# Patient Record
Sex: MALE | Race: OTHER | NOT HISPANIC OR LATINO | URBAN - METROPOLITAN AREA
[De-identification: names, ages, dates, MRNs, and addresses within clinical notes are randomized per-mention and may not be internally consistent; named-entity substitution may affect disease eponyms.]

---

## 2024-01-01 ENCOUNTER — HOSPITAL ENCOUNTER (OUTPATIENT)
Dept: TELEMEDICINE | Facility: HOSPITAL | Age: 89
Discharge: HOME | End: 2024-01-27
Payer: MEDICARE

## 2024-01-01 ENCOUNTER — ANESTHESIA (OUTPATIENT)
Dept: ENDOSCOPY | Facility: HOSPITAL | Age: 89
DRG: 024 | End: 2024-01-01
Payer: MEDICARE

## 2024-01-01 ENCOUNTER — ANESTHESIA (OUTPATIENT)
Dept: INTERVENTIONAL RADIOLOGY/VASCULAR | Facility: HOSPITAL | Age: 89
DRG: 024 | End: 2024-01-01
Payer: MEDICARE

## 2024-01-01 ENCOUNTER — HOSPITAL ENCOUNTER (INPATIENT)
Facility: HOSPITAL | Age: 89
LOS: 5 days | Discharge: HOSPICE | DRG: 024 | End: 2024-02-01
Attending: STUDENT IN AN ORGANIZED HEALTH CARE EDUCATION/TRAINING PROGRAM | Admitting: INTERNAL MEDICINE
Payer: MEDICARE

## 2024-01-01 ENCOUNTER — ANESTHESIA EVENT (OUTPATIENT)
Dept: ENDOSCOPY | Facility: HOSPITAL | Age: 89
DRG: 024 | End: 2024-01-01
Payer: MEDICARE

## 2024-01-01 ENCOUNTER — HOSPITAL ENCOUNTER (INPATIENT)
Facility: HOSPITAL | Age: 89
LOS: 1 days | DRG: 951 | End: 2024-02-03
Attending: PSYCHIATRY & NEUROLOGY | Admitting: PSYCHIATRY & NEUROLOGY
Payer: OTHER MISCELLANEOUS

## 2024-01-01 VITALS
HEART RATE: 69 BPM | OXYGEN SATURATION: 90 % | SYSTOLIC BLOOD PRESSURE: 126 MMHG | BODY MASS INDEX: 24.78 KG/M2 | RESPIRATION RATE: 18 BRPM | HEIGHT: 70 IN | TEMPERATURE: 98 F | DIASTOLIC BLOOD PRESSURE: 58 MMHG | WEIGHT: 173.06 LBS

## 2024-01-01 VITALS
SYSTOLIC BLOOD PRESSURE: 151 MMHG | OXYGEN SATURATION: 99 % | TEMPERATURE: 99 F | HEART RATE: 84 BPM | RESPIRATION RATE: 20 BRPM | DIASTOLIC BLOOD PRESSURE: 69 MMHG

## 2024-01-01 VITALS
HEART RATE: 160 BPM | TEMPERATURE: 101 F | DIASTOLIC BLOOD PRESSURE: 58 MMHG | RESPIRATION RATE: 34 BRPM | SYSTOLIC BLOOD PRESSURE: 75 MMHG | OXYGEN SATURATION: 78 %

## 2024-01-01 DIAGNOSIS — Z71.89 ACP (ADVANCE CARE PLANNING): ICD-10-CM

## 2024-01-01 DIAGNOSIS — Z95.0 PACEMAKER: ICD-10-CM

## 2024-01-01 DIAGNOSIS — Z51.5 PALLIATIVE CARE ENCOUNTER: ICD-10-CM

## 2024-01-01 DIAGNOSIS — I63.9 ACUTE CVA (CEREBROVASCULAR ACCIDENT): ICD-10-CM

## 2024-01-01 DIAGNOSIS — I63.9 ISCHEMIC CEREBROVASCULAR ACCIDENT (CVA): ICD-10-CM

## 2024-01-01 DIAGNOSIS — I63.412 EMBOLIC STROKE INVOLVING LEFT MIDDLE CEREBRAL ARTERY: ICD-10-CM

## 2024-01-01 DIAGNOSIS — I63.412 EMBOLIC STROKE INVOLVING LEFT MIDDLE CEREBRAL ARTERY: Primary | ICD-10-CM

## 2024-01-01 DIAGNOSIS — I63.9 CEREBROVASCULAR ACCIDENT (CVA), UNSPECIFIED MECHANISM: Primary | ICD-10-CM

## 2024-01-01 LAB
ABO + RH BLD: NORMAL
ALBUMIN SERPL BCP-MCNC: 3.1 G/DL (ref 3.5–5.2)
ALBUMIN SERPL BCP-MCNC: 3.3 G/DL (ref 3.5–5.2)
ALBUMIN SERPL BCP-MCNC: 3.5 G/DL (ref 3.5–5.2)
ALLENS TEST: ABNORMAL
ALP SERPL-CCNC: 84 U/L (ref 55–135)
ALP SERPL-CCNC: 85 U/L (ref 55–135)
ALP SERPL-CCNC: 87 U/L (ref 55–135)
ALP SERPL-CCNC: 87 U/L (ref 55–135)
ALP SERPL-CCNC: 95 U/L (ref 55–135)
ALT SERPL W/O P-5'-P-CCNC: 12 U/L (ref 10–44)
ALT SERPL W/O P-5'-P-CCNC: 14 U/L (ref 10–44)
ALT SERPL W/O P-5'-P-CCNC: 21 U/L (ref 10–44)
ALT SERPL W/O P-5'-P-CCNC: 22 U/L (ref 10–44)
ALT SERPL W/O P-5'-P-CCNC: 33 U/L (ref 10–44)
ANION GAP SERPL CALC-SCNC: 10 MMOL/L (ref 8–16)
ANION GAP SERPL CALC-SCNC: 12 MMOL/L (ref 8–16)
ANION GAP SERPL CALC-SCNC: 13 MMOL/L (ref 8–16)
ANION GAP SERPL CALC-SCNC: 17 MMOL/L (ref 8–16)
ANION GAP SERPL CALC-SCNC: 9 MMOL/L (ref 8–16)
ANION GAP SERPL CALC-SCNC: 9 MMOL/L (ref 8–16)
APTT PPP: 24.8 SEC (ref 21–32)
APTT PPP: 26.9 SEC (ref 21–32)
ASCENDING AORTA: 3.1 CM
AST SERPL-CCNC: 14 U/L (ref 10–40)
AST SERPL-CCNC: 16 U/L (ref 10–40)
AST SERPL-CCNC: 25 U/L (ref 10–40)
AST SERPL-CCNC: 30 U/L (ref 10–40)
AST SERPL-CCNC: 34 U/L (ref 10–40)
AV INDEX (PROSTH): 1.81
AV MEAN GRADIENT: 2 MMHG
AV PEAK GRADIENT: 2 MMHG
AV VALVE AREA BY VELOCITY RATIO: 3.34 CM²
AV VALVE AREA: 6.2 CM²
AV VELOCITY RATIO: 0.97
BACTERIA SPEC AEROBE CULT: NORMAL
BACTERIA SPEC AEROBE CULT: NORMAL
BASOPHILS # BLD AUTO: 0.02 K/UL (ref 0–0.2)
BASOPHILS # BLD AUTO: 0.05 K/UL (ref 0–0.2)
BASOPHILS # BLD AUTO: 0.08 K/UL (ref 0–0.2)
BASOPHILS NFR BLD: 0.3 % (ref 0–1.9)
BASOPHILS NFR BLD: 0.6 % (ref 0–1.9)
BASOPHILS NFR BLD: 1 % (ref 0–1.9)
BILIRUB SERPL-MCNC: 0.6 MG/DL (ref 0.1–1)
BILIRUB SERPL-MCNC: 0.7 MG/DL (ref 0.1–1)
BILIRUB SERPL-MCNC: 0.7 MG/DL (ref 0.1–1)
BILIRUB SERPL-MCNC: 1 MG/DL (ref 0.1–1)
BILIRUB SERPL-MCNC: 1.1 MG/DL (ref 0.1–1)
BILIRUB UR QL STRIP: NEGATIVE
BLD GP AB SCN CELLS X3 SERPL QL: NORMAL
BSA FOR ECHO PROCEDURE: 1.89 M2
BUN SERPL-MCNC: 25 MG/DL (ref 8–23)
BUN SERPL-MCNC: 28 MG/DL (ref 8–23)
BUN SERPL-MCNC: 31 MG/DL (ref 8–23)
BUN SERPL-MCNC: 31 MG/DL (ref 8–23)
BUN SERPL-MCNC: 32 MG/DL (ref 8–23)
BUN SERPL-MCNC: 35 MG/DL (ref 8–23)
CALCIUM SERPL-MCNC: 9.2 MG/DL (ref 8.7–10.5)
CALCIUM SERPL-MCNC: 9.3 MG/DL (ref 8.7–10.5)
CALCIUM SERPL-MCNC: 9.4 MG/DL (ref 8.7–10.5)
CALCIUM SERPL-MCNC: 9.4 MG/DL (ref 8.7–10.5)
CHLORIDE SERPL-SCNC: 101 MMOL/L (ref 95–110)
CHLORIDE SERPL-SCNC: 103 MMOL/L (ref 95–110)
CHLORIDE SERPL-SCNC: 107 MMOL/L (ref 95–110)
CHLORIDE SERPL-SCNC: 108 MMOL/L (ref 95–110)
CHLORIDE SERPL-SCNC: 108 MMOL/L (ref 95–110)
CHLORIDE SERPL-SCNC: 99 MMOL/L (ref 95–110)
CHOLEST SERPL-MCNC: 220 MG/DL (ref 120–199)
CHOLEST/HDLC SERPL: 5.6 {RATIO} (ref 2–5)
CLARITY UR REFRACT.AUTO: CLEAR
CO2 SERPL-SCNC: 20 MMOL/L (ref 23–29)
CO2 SERPL-SCNC: 20 MMOL/L (ref 23–29)
CO2 SERPL-SCNC: 22 MMOL/L (ref 23–29)
CO2 SERPL-SCNC: 22 MMOL/L (ref 23–29)
CO2 SERPL-SCNC: 23 MMOL/L (ref 23–29)
CO2 SERPL-SCNC: 24 MMOL/L (ref 23–29)
COLOR UR AUTO: YELLOW
CREAT SERPL-MCNC: 1.1 MG/DL (ref 0.5–1.4)
CREAT SERPL-MCNC: 1.2 MG/DL (ref 0.5–1.4)
CREAT SERPL-MCNC: 1.3 MG/DL (ref 0.5–1.4)
CREAT SERPL-MCNC: 1.4 MG/DL (ref 0.5–1.4)
CREAT SERPL-MCNC: 1.4 MG/DL (ref 0.5–1.4)
CREAT SERPL-MCNC: 1.7 MG/DL (ref 0.5–1.4)
CV ECHO LV RWT: 0.47 CM
DELSYS: ABNORMAL
DIFFERENTIAL METHOD BLD: ABNORMAL
DOP CALC AO PEAK VEL: 0.78 M/S
DOP CALC AO VTI: 9.13 CM
DOP CALC LVOT AREA: 3.4 CM2
DOP CALC LVOT DIAMETER: 2.09 CM
DOP CALC LVOT PEAK VEL: 0.76 M/S
DOP CALC LVOT STROKE VOLUME: 56.65 CM3
DOP CALCLVOT PEAK VEL VTI: 16.52 CM
E WAVE DECELERATION TIME: 116.31 MSEC
E/A RATIO: 1.95
E/E' RATIO: 10.19 M/S
ECHO LV POSTERIOR WALL: 0.97 CM (ref 0.6–1.1)
EJECTION FRACTION: 43 %
EOSINOPHIL # BLD AUTO: 0 K/UL (ref 0–0.5)
EOSINOPHIL # BLD AUTO: 0.1 K/UL (ref 0–0.5)
EOSINOPHIL # BLD AUTO: 0.2 K/UL (ref 0–0.5)
EOSINOPHIL NFR BLD: 0.2 % (ref 0–8)
EOSINOPHIL NFR BLD: 0.6 % (ref 0–8)
EOSINOPHIL NFR BLD: 1.4 % (ref 0–8)
EOSINOPHIL NFR BLD: 1.7 % (ref 0–8)
EOSINOPHIL NFR BLD: 2.9 % (ref 0–8)
ERYTHROCYTE [DISTWIDTH] IN BLOOD BY AUTOMATED COUNT: 12.7 % (ref 11.5–14.5)
ERYTHROCYTE [DISTWIDTH] IN BLOOD BY AUTOMATED COUNT: 12.8 % (ref 11.5–14.5)
ERYTHROCYTE [DISTWIDTH] IN BLOOD BY AUTOMATED COUNT: 13.1 % (ref 11.5–14.5)
ERYTHROCYTE [DISTWIDTH] IN BLOOD BY AUTOMATED COUNT: 13.1 % (ref 11.5–14.5)
ERYTHROCYTE [DISTWIDTH] IN BLOOD BY AUTOMATED COUNT: 13.2 % (ref 11.5–14.5)
ERYTHROCYTE [SEDIMENTATION RATE] IN BLOOD BY WESTERGREN METHOD: 24 MM/H
EST. GFR  (NO RACE VARIABLE): 38.1 ML/MIN/1.73 M^2
EST. GFR  (NO RACE VARIABLE): 48 ML/MIN/1.73 M^2
EST. GFR  (NO RACE VARIABLE): 48 ML/MIN/1.73 M^2
EST. GFR  (NO RACE VARIABLE): 52.5 ML/MIN/1.73 M^2
EST. GFR  (NO RACE VARIABLE): 57.8 ML/MIN/1.73 M^2
EST. GFR  (NO RACE VARIABLE): >60 ML/MIN/1.73 M^2
ESTIMATED AVG GLUCOSE: 189 MG/DL (ref 68–131)
FIO2: 50
FLOW: 15
FRACTIONAL SHORTENING: 8 % (ref 28–44)
GLUCOSE SERPL-MCNC: 186 MG/DL (ref 70–110)
GLUCOSE SERPL-MCNC: 186 MG/DL (ref 70–110)
GLUCOSE SERPL-MCNC: 187 MG/DL (ref 70–110)
GLUCOSE SERPL-MCNC: 191 MG/DL (ref 70–110)
GLUCOSE SERPL-MCNC: 236 MG/DL (ref 70–110)
GLUCOSE SERPL-MCNC: 283 MG/DL (ref 70–110)
GLUCOSE UR QL STRIP: NEGATIVE
GRAM STN SPEC: NORMAL
GRAM STN SPEC: NORMAL
HBA1C MFR BLD: 8.2 % (ref 4–5.6)
HCO3 UR-SCNC: 23 MMOL/L (ref 24–28)
HCT VFR BLD AUTO: 34.6 % (ref 40–54)
HCT VFR BLD AUTO: 34.7 % (ref 40–54)
HCT VFR BLD AUTO: 35.1 % (ref 40–54)
HCT VFR BLD AUTO: 36.9 % (ref 40–54)
HCT VFR BLD AUTO: 37.8 % (ref 40–54)
HDLC SERPL-MCNC: 39 MG/DL (ref 40–75)
HDLC SERPL: 17.7 % (ref 20–50)
HGB BLD-MCNC: 11.2 G/DL (ref 14–18)
HGB BLD-MCNC: 11.3 G/DL (ref 14–18)
HGB BLD-MCNC: 11.5 G/DL (ref 14–18)
HGB BLD-MCNC: 12 G/DL (ref 14–18)
HGB BLD-MCNC: 12.4 G/DL (ref 14–18)
HGB UR QL STRIP: NEGATIVE
IMM GRANULOCYTES # BLD AUTO: 0.01 K/UL (ref 0–0.04)
IMM GRANULOCYTES # BLD AUTO: 0.02 K/UL (ref 0–0.04)
IMM GRANULOCYTES # BLD AUTO: 0.03 K/UL (ref 0–0.04)
IMM GRANULOCYTES # BLD AUTO: 0.03 K/UL (ref 0–0.04)
IMM GRANULOCYTES # BLD AUTO: 0.04 K/UL (ref 0–0.04)
IMM GRANULOCYTES NFR BLD AUTO: 0.2 % (ref 0–0.5)
IMM GRANULOCYTES NFR BLD AUTO: 0.2 % (ref 0–0.5)
IMM GRANULOCYTES NFR BLD AUTO: 0.4 % (ref 0–0.5)
IMM GRANULOCYTES NFR BLD AUTO: 0.4 % (ref 0–0.5)
IMM GRANULOCYTES NFR BLD AUTO: 0.5 % (ref 0–0.5)
INR PPP: 1 (ref 0.8–1.2)
INR PPP: 1.1 (ref 0.8–1.2)
INTERVENTRICULAR SEPTUM: 0.71 CM (ref 0.6–1.1)
IVRT: 82.78 MSEC
KETONES UR QL STRIP: ABNORMAL
LA MAJOR: 6.79 CM
LA MINOR: 6.64 CM
LA WIDTH: 4.65 CM
LACTATE SERPL-SCNC: 1.1 MMOL/L (ref 0.5–2.2)
LACTATE SERPL-SCNC: 1.3 MMOL/L (ref 0.5–2.2)
LDLC SERPL CALC-MCNC: 155.6 MG/DL (ref 63–159)
LEFT ATRIUM SIZE: 4.01 CM
LEFT ATRIUM VOLUME INDEX MOD: 39.6 ML/M2
LEFT ATRIUM VOLUME INDEX: 56 ML/M2
LEFT ATRIUM VOLUME MOD: 75.25 CM3
LEFT ATRIUM VOLUME: 106.42 CM3
LEFT INTERNAL DIMENSION IN SYSTOLE: 3.79 CM (ref 2.1–4)
LEFT VENTRICLE DIASTOLIC VOLUME INDEX: 39.36 ML/M2
LEFT VENTRICLE DIASTOLIC VOLUME: 74.79 ML
LEFT VENTRICLE MASS INDEX: 55 G/M2
LEFT VENTRICLE SYSTOLIC VOLUME INDEX: 32.4 ML/M2
LEFT VENTRICLE SYSTOLIC VOLUME: 61.59 ML
LEFT VENTRICULAR INTERNAL DIMENSION IN DIASTOLE: 4.11 CM (ref 3.5–6)
LEFT VENTRICULAR MASS: 104.33 G
LEUKOCYTE ESTERASE UR QL STRIP: NEGATIVE
LV LATERAL E/E' RATIO: 6.69 M/S
LV SEPTAL E/E' RATIO: 21.4 M/S
LYMPHOCYTES # BLD AUTO: 0.9 K/UL (ref 1–4.8)
LYMPHOCYTES # BLD AUTO: 1.3 K/UL (ref 1–4.8)
LYMPHOCYTES # BLD AUTO: 1.4 K/UL (ref 1–4.8)
LYMPHOCYTES # BLD AUTO: 1.5 K/UL (ref 1–4.8)
LYMPHOCYTES # BLD AUTO: 1.7 K/UL (ref 1–4.8)
LYMPHOCYTES NFR BLD: 15.8 % (ref 18–48)
LYMPHOCYTES NFR BLD: 15.9 % (ref 18–48)
LYMPHOCYTES NFR BLD: 17.2 % (ref 18–48)
LYMPHOCYTES NFR BLD: 18 % (ref 18–48)
LYMPHOCYTES NFR BLD: 20.7 % (ref 18–48)
MAGNESIUM SERPL-MCNC: 1.8 MG/DL (ref 1.6–2.6)
MAGNESIUM SERPL-MCNC: 2.2 MG/DL (ref 1.6–2.6)
MAGNESIUM SERPL-MCNC: 2.3 MG/DL (ref 1.6–2.6)
MAGNESIUM SERPL-MCNC: 2.4 MG/DL (ref 1.6–2.6)
MAGNESIUM SERPL-MCNC: 2.8 MG/DL (ref 1.6–2.6)
MCH RBC QN AUTO: 29.4 PG (ref 27–31)
MCH RBC QN AUTO: 29.6 PG (ref 27–31)
MCH RBC QN AUTO: 29.7 PG (ref 27–31)
MCH RBC QN AUTO: 29.7 PG (ref 27–31)
MCH RBC QN AUTO: 29.9 PG (ref 27–31)
MCHC RBC AUTO-ENTMCNC: 32.3 G/DL (ref 32–36)
MCHC RBC AUTO-ENTMCNC: 32.5 G/DL (ref 32–36)
MCHC RBC AUTO-ENTMCNC: 32.7 G/DL (ref 32–36)
MCHC RBC AUTO-ENTMCNC: 32.8 G/DL (ref 32–36)
MCHC RBC AUTO-ENTMCNC: 32.8 G/DL (ref 32–36)
MCV RBC AUTO: 90 FL (ref 82–98)
MCV RBC AUTO: 91 FL (ref 82–98)
MCV RBC AUTO: 92 FL (ref 82–98)
MODE: ABNORMAL
MONOCYTES # BLD AUTO: 0.4 K/UL (ref 0.3–1)
MONOCYTES # BLD AUTO: 0.9 K/UL (ref 0.3–1)
MONOCYTES # BLD AUTO: 1.1 K/UL (ref 0.3–1)
MONOCYTES NFR BLD: 10.3 % (ref 4–15)
MONOCYTES NFR BLD: 12.8 % (ref 4–15)
MONOCYTES NFR BLD: 13.2 % (ref 4–15)
MONOCYTES NFR BLD: 13.4 % (ref 4–15)
MONOCYTES NFR BLD: 7.4 % (ref 4–15)
MV PEAK A VEL: 0.55 M/S
MV PEAK E VEL: 1.07 M/S
MV STENOSIS PRESSURE HALF TIME: 33.73 MS
MV VALVE AREA P 1/2 METHOD: 6.52 CM2
NEUTROPHILS # BLD AUTO: 4.3 K/UL (ref 1.8–7.7)
NEUTROPHILS # BLD AUTO: 5.1 K/UL (ref 1.8–7.7)
NEUTROPHILS # BLD AUTO: 5.6 K/UL (ref 1.8–7.7)
NEUTROPHILS # BLD AUTO: 5.8 K/UL (ref 1.8–7.7)
NEUTROPHILS # BLD AUTO: 5.9 K/UL (ref 1.8–7.7)
NEUTROPHILS NFR BLD: 63.9 % (ref 38–73)
NEUTROPHILS NFR BLD: 67.2 % (ref 38–73)
NEUTROPHILS NFR BLD: 68.5 % (ref 38–73)
NEUTROPHILS NFR BLD: 70.7 % (ref 38–73)
NEUTROPHILS NFR BLD: 73.4 % (ref 38–73)
NITRITE UR QL STRIP: NEGATIVE
NONHDLC SERPL-MCNC: 181 MG/DL
NRBC BLD-RTO: 0 /100 WBC
PCO2 BLDA: 28.5 MMHG (ref 35–45)
PH SMN: 7.51 [PH] (ref 7.35–7.45)
PH UR STRIP: 5 [PH] (ref 5–8)
PHOSPHATE SERPL-MCNC: 2.3 MG/DL (ref 2.7–4.5)
PHOSPHATE SERPL-MCNC: 2.5 MG/DL (ref 2.7–4.5)
PHOSPHATE SERPL-MCNC: 3.3 MG/DL (ref 2.7–4.5)
PHOSPHATE SERPL-MCNC: 3.4 MG/DL (ref 2.7–4.5)
PHOSPHATE SERPL-MCNC: 4.5 MG/DL (ref 2.7–4.5)
PHOSPHATE SERPL-MCNC: 5.3 MG/DL (ref 2.7–4.5)
PISA TR MAX VEL: 2.59 M/S
PLATELET # BLD AUTO: 180 K/UL (ref 150–450)
PLATELET # BLD AUTO: 184 K/UL (ref 150–450)
PLATELET # BLD AUTO: 194 K/UL (ref 150–450)
PLATELET # BLD AUTO: 196 K/UL (ref 150–450)
PLATELET # BLD AUTO: 198 K/UL (ref 150–450)
PMV BLD AUTO: 11.5 FL (ref 9.2–12.9)
PMV BLD AUTO: 11.5 FL (ref 9.2–12.9)
PMV BLD AUTO: 11.6 FL (ref 9.2–12.9)
PMV BLD AUTO: 12.1 FL (ref 9.2–12.9)
PMV BLD AUTO: 12.1 FL (ref 9.2–12.9)
PO2 BLDA: 144 MMHG (ref 80–100)
POC BE: 0 MMOL/L
POC SATURATED O2: 99 % (ref 95–100)
POC TCO2: 24 MMOL/L (ref 23–27)
POCT GLUCOSE: 146 MG/DL (ref 70–110)
POCT GLUCOSE: 169 MG/DL (ref 70–110)
POCT GLUCOSE: 177 MG/DL (ref 70–110)
POCT GLUCOSE: 178 MG/DL (ref 70–110)
POCT GLUCOSE: 182 MG/DL (ref 70–110)
POCT GLUCOSE: 185 MG/DL (ref 70–110)
POCT GLUCOSE: 189 MG/DL (ref 70–110)
POCT GLUCOSE: 191 MG/DL (ref 70–110)
POCT GLUCOSE: 193 MG/DL (ref 70–110)
POCT GLUCOSE: 207 MG/DL (ref 70–110)
POCT GLUCOSE: 207 MG/DL (ref 70–110)
POCT GLUCOSE: 223 MG/DL (ref 70–110)
POCT GLUCOSE: 226 MG/DL (ref 70–110)
POCT GLUCOSE: 259 MG/DL (ref 70–110)
POCT GLUCOSE: 295 MG/DL (ref 70–110)
POCT GLUCOSE: 301 MG/DL (ref 70–110)
POCT GLUCOSE: 312 MG/DL (ref 70–110)
POTASSIUM SERPL-SCNC: 3.5 MMOL/L (ref 3.5–5.1)
POTASSIUM SERPL-SCNC: 3.8 MMOL/L (ref 3.5–5.1)
POTASSIUM SERPL-SCNC: 4 MMOL/L (ref 3.5–5.1)
POTASSIUM SERPL-SCNC: 4.2 MMOL/L (ref 3.5–5.1)
POTASSIUM SERPL-SCNC: 4.3 MMOL/L (ref 3.5–5.1)
POTASSIUM SERPL-SCNC: 4.4 MMOL/L (ref 3.5–5.1)
POTASSIUM SERPL-SCNC: 4.4 MMOL/L (ref 3.5–5.1)
PROCALCITONIN SERPL IA-MCNC: 0.06 NG/ML
PROT SERPL-MCNC: 6.5 G/DL (ref 6–8.4)
PROT SERPL-MCNC: 6.5 G/DL (ref 6–8.4)
PROT SERPL-MCNC: 6.8 G/DL (ref 6–8.4)
PROT SERPL-MCNC: 6.8 G/DL (ref 6–8.4)
PROT SERPL-MCNC: 7 G/DL (ref 6–8.4)
PROT UR QL STRIP: NEGATIVE
PROTHROMBIN TIME: 11 SEC (ref 9–12.5)
PROTHROMBIN TIME: 11.3 SEC (ref 9–12.5)
RA MAJOR: 5.48 CM
RA PRESSURE ESTIMATED: 8 MMHG
RA WIDTH: 3.67 CM
RBC # BLD AUTO: 3.78 M/UL (ref 4.6–6.2)
RBC # BLD AUTO: 3.8 M/UL (ref 4.6–6.2)
RBC # BLD AUTO: 3.85 M/UL (ref 4.6–6.2)
RBC # BLD AUTO: 4.04 M/UL (ref 4.6–6.2)
RBC # BLD AUTO: 4.22 M/UL (ref 4.6–6.2)
RIGHT VENTRICULAR END-DIASTOLIC DIMENSION: 2.87 CM
RV TB RVSP: 11 MMHG
SAMPLE: ABNORMAL
SARS-COV-2 RNA RESP QL NAA+PROBE: NOT DETECTED
SINUS: 2.98 CM
SITE: ABNORMAL
SODIUM SERPL-SCNC: 135 MMOL/L (ref 136–145)
SODIUM SERPL-SCNC: 136 MMOL/L (ref 136–145)
SODIUM SERPL-SCNC: 136 MMOL/L (ref 136–145)
SODIUM SERPL-SCNC: 139 MMOL/L (ref 136–145)
SODIUM SERPL-SCNC: 140 MMOL/L (ref 136–145)
SODIUM SERPL-SCNC: 141 MMOL/L (ref 136–145)
SP GR UR STRIP: >1.03 (ref 1–1.03)
SP02: 97
SPECIMEN OUTDATE: NORMAL
STJ: 2.35 CM
TDI LATERAL: 0.16 M/S
TDI SEPTAL: 0.05 M/S
TDI: 0.11 M/S
TR MAX PG: 27 MMHG
TRICUSPID ANNULAR PLANE SYSTOLIC EXCURSION: 2.1 CM
TRIGL SERPL-MCNC: 127 MG/DL (ref 30–150)
TSH SERPL DL<=0.005 MIU/L-ACNC: 1.88 UIU/ML (ref 0.4–4)
TV REST PULMONARY ARTERY PRESSURE: 35 MMHG
URN SPEC COLLECT METH UR: ABNORMAL
VANCOMYCIN TROUGH SERPL-MCNC: 12.8 UG/ML (ref 10–22)
WBC # BLD AUTO: 5.83 K/UL (ref 3.9–12.7)
WBC # BLD AUTO: 8.01 K/UL (ref 3.9–12.7)
WBC # BLD AUTO: 8.32 K/UL (ref 3.9–12.7)
WBC # BLD AUTO: 8.36 K/UL (ref 3.9–12.7)
WBC # BLD AUTO: 8.44 K/UL (ref 3.9–12.7)
Z-SCORE OF LEFT VENTRICULAR DIMENSION IN END DIASTOLE: -2.6
Z-SCORE OF LEFT VENTRICULAR DIMENSION IN END SYSTOLE: 1.13

## 2024-01-01 PROCEDURE — 27000221 HC OXYGEN, UP TO 24 HOURS

## 2024-01-01 PROCEDURE — 92523 SPEECH SOUND LANG COMPREHEN: CPT

## 2024-01-01 PROCEDURE — 43241 EGD TUBE/CATH INSERTION: CPT | Mod: GC,,, | Performed by: INTERNAL MEDICINE

## 2024-01-01 PROCEDURE — 25000003 PHARM REV CODE 250: Performed by: PHYSICIAN ASSISTANT

## 2024-01-01 PROCEDURE — 85025 COMPLETE CBC W/AUTO DIFF WBC: CPT | Performed by: NURSE PRACTITIONER

## 2024-01-01 PROCEDURE — 99233 SBSQ HOSP IP/OBS HIGH 50: CPT | Mod: ,,, | Performed by: PSYCHIATRY & NEUROLOGY

## 2024-01-01 PROCEDURE — 25000003 PHARM REV CODE 250: Performed by: REGISTERED NURSE

## 2024-01-01 PROCEDURE — 20000000 HC ICU ROOM

## 2024-01-01 PROCEDURE — 87070 CULTURE OTHR SPECIMN AEROBIC: CPT | Performed by: NURSE PRACTITIONER

## 2024-01-01 PROCEDURE — 37000009 HC ANESTHESIA EA ADD 15 MINS: Performed by: INTERNAL MEDICINE

## 2024-01-01 PROCEDURE — 99291 CRITICAL CARE FIRST HOUR: CPT | Mod: GC,,, | Performed by: PSYCHIATRY & NEUROLOGY

## 2024-01-01 PROCEDURE — 63600175 PHARM REV CODE 636 W HCPCS: Performed by: REGISTERED NURSE

## 2024-01-01 PROCEDURE — 84100 ASSAY OF PHOSPHORUS: CPT | Performed by: NURSE PRACTITIONER

## 2024-01-01 PROCEDURE — 99292 CRITICAL CARE ADDL 30 MIN: CPT | Mod: ,,, | Performed by: PHYSICIAN ASSISTANT

## 2024-01-01 PROCEDURE — 25000003 PHARM REV CODE 250

## 2024-01-01 PROCEDURE — D9220A PRA ANESTHESIA: Mod: CRNA,,, | Performed by: NURSE ANESTHETIST, CERTIFIED REGISTERED

## 2024-01-01 PROCEDURE — 63600175 PHARM REV CODE 636 W HCPCS: Mod: JZ,JG | Performed by: PHYSICIAN ASSISTANT

## 2024-01-01 PROCEDURE — 93010 ELECTROCARDIOGRAM REPORT: CPT | Mod: ,,, | Performed by: INTERNAL MEDICINE

## 2024-01-01 PROCEDURE — 11000001 HC ACUTE MED/SURG PRIVATE ROOM

## 2024-01-01 PROCEDURE — 94640 AIRWAY INHALATION TREATMENT: CPT

## 2024-01-01 PROCEDURE — 94761 N-INVAS EAR/PLS OXIMETRY MLT: CPT

## 2024-01-01 PROCEDURE — 86580 TB INTRADERMAL TEST: CPT | Performed by: PSYCHIATRY & NEUROLOGY

## 2024-01-01 PROCEDURE — 94668 MNPJ CHEST WALL SBSQ: CPT

## 2024-01-01 PROCEDURE — 83605 ASSAY OF LACTIC ACID: CPT

## 2024-01-01 PROCEDURE — 94667 MNPJ CHEST WALL 1ST: CPT

## 2024-01-01 PROCEDURE — 99291 CRITICAL CARE FIRST HOUR: CPT | Mod: 25,,, | Performed by: NURSE PRACTITIONER

## 2024-01-01 PROCEDURE — 83735 ASSAY OF MAGNESIUM: CPT | Performed by: NURSE PRACTITIONER

## 2024-01-01 PROCEDURE — B317YZZ FLUOROSCOPY OF LEFT INTERNAL CAROTID ARTERY USING OTHER CONTRAST: ICD-10-PCS | Performed by: PSYCHIATRY & NEUROLOGY

## 2024-01-01 PROCEDURE — 83036 HEMOGLOBIN GLYCOSYLATED A1C: CPT | Performed by: PHYSICIAN ASSISTANT

## 2024-01-01 PROCEDURE — 80061 LIPID PANEL: CPT | Performed by: NURSE PRACTITIONER

## 2024-01-01 PROCEDURE — 85610 PROTHROMBIN TIME: CPT | Performed by: PHYSICIAN ASSISTANT

## 2024-01-01 PROCEDURE — 84100 ASSAY OF PHOSPHORUS: CPT | Mod: 91 | Performed by: PSYCHIATRY & NEUROLOGY

## 2024-01-01 PROCEDURE — 99285 EMERGENCY DEPT VISIT HI MDM: CPT | Mod: 25

## 2024-01-01 PROCEDURE — 25000242 PHARM REV CODE 250 ALT 637 W/ HCPCS

## 2024-01-01 PROCEDURE — 63600175 PHARM REV CODE 636 W HCPCS

## 2024-01-01 PROCEDURE — 25000003 PHARM REV CODE 250: Performed by: PSYCHIATRY & NEUROLOGY

## 2024-01-01 PROCEDURE — 63600175 PHARM REV CODE 636 W HCPCS: Performed by: ANESTHESIOLOGY

## 2024-01-01 PROCEDURE — 87205 SMEAR GRAM STAIN: CPT | Performed by: NURSE PRACTITIONER

## 2024-01-01 PROCEDURE — 63600175 PHARM REV CODE 636 W HCPCS: Performed by: NURSE PRACTITIONER

## 2024-01-01 PROCEDURE — 63600175 PHARM REV CODE 636 W HCPCS: Performed by: PSYCHIATRY & NEUROLOGY

## 2024-01-01 PROCEDURE — 80048 BASIC METABOLIC PNL TOTAL CA: CPT | Performed by: NURSE PRACTITIONER

## 2024-01-01 PROCEDURE — 03CG3ZZ EXTIRPATION OF MATTER FROM INTRACRANIAL ARTERY, PERCUTANEOUS APPROACH: ICD-10-PCS | Performed by: PSYCHIATRY & NEUROLOGY

## 2024-01-01 PROCEDURE — 80053 COMPREHEN METABOLIC PANEL: CPT | Performed by: NURSE PRACTITIONER

## 2024-01-01 PROCEDURE — 25000003 PHARM REV CODE 250: Performed by: NURSE PRACTITIONER

## 2024-01-01 PROCEDURE — 99499 UNLISTED E&M SERVICE: CPT | Mod: GC,,, | Performed by: PSYCHIATRY & NEUROLOGY

## 2024-01-01 PROCEDURE — 99223 1ST HOSP IP/OBS HIGH 75: CPT | Mod: ,,,

## 2024-01-01 PROCEDURE — 31720 CLEARANCE OF AIRWAYS: CPT

## 2024-01-01 PROCEDURE — 81003 URINALYSIS AUTO W/O SCOPE: CPT | Performed by: NURSE PRACTITIONER

## 2024-01-01 PROCEDURE — D9220A PRA ANESTHESIA: Mod: ANES,,, | Performed by: ANESTHESIOLOGY

## 2024-01-01 PROCEDURE — 25000003 PHARM REV CODE 250: Performed by: ANESTHESIOLOGY

## 2024-01-01 PROCEDURE — 99900035 HC TECH TIME PER 15 MIN (STAT)

## 2024-01-01 PROCEDURE — 25000003 PHARM REV CODE 250: Performed by: NURSE ANESTHETIST, CERTIFIED REGISTERED

## 2024-01-01 PROCEDURE — 83605 ASSAY OF LACTIC ACID: CPT | Performed by: NURSE PRACTITIONER

## 2024-01-01 PROCEDURE — 63600175 PHARM REV CODE 636 W HCPCS: Performed by: INTERNAL MEDICINE

## 2024-01-01 PROCEDURE — 63600175 PHARM REV CODE 636 W HCPCS: Performed by: PHYSICIAN ASSISTANT

## 2024-01-01 PROCEDURE — 93005 ELECTROCARDIOGRAM TRACING: CPT

## 2024-01-01 PROCEDURE — 80202 ASSAY OF VANCOMYCIN: CPT | Performed by: PSYCHIATRY & NEUROLOGY

## 2024-01-01 PROCEDURE — 99291 CRITICAL CARE FIRST HOUR: CPT | Mod: ,,, | Performed by: REGISTERED NURSE

## 2024-01-01 PROCEDURE — 99233 SBSQ HOSP IP/OBS HIGH 50: CPT | Mod: ,,,

## 2024-01-01 PROCEDURE — 36415 COLL VENOUS BLD VENIPUNCTURE: CPT | Performed by: INTERNAL MEDICINE

## 2024-01-01 PROCEDURE — 25000003 PHARM REV CODE 250: Performed by: INTERNAL MEDICINE

## 2024-01-01 PROCEDURE — 63600175 PHARM REV CODE 636 W HCPCS: Mod: JG | Performed by: NURSE ANESTHETIST, CERTIFIED REGISTERED

## 2024-01-01 PROCEDURE — 37000008 HC ANESTHESIA 1ST 15 MINUTES: Performed by: INTERNAL MEDICINE

## 2024-01-01 PROCEDURE — 85730 THROMBOPLASTIN TIME PARTIAL: CPT | Performed by: PHYSICIAN ASSISTANT

## 2024-01-01 PROCEDURE — 30200315 PPD INTRADERMAL TEST REV CODE 302: Performed by: PSYCHIATRY & NEUROLOGY

## 2024-01-01 PROCEDURE — 27100171 HC OXYGEN HIGH FLOW UP TO 24 HOURS

## 2024-01-01 PROCEDURE — G0408 INPT/TELE FOLLOW UP 35: HCPCS | Mod: 95,,, | Performed by: STUDENT IN AN ORGANIZED HEALTH CARE EDUCATION/TRAINING PROGRAM

## 2024-01-01 PROCEDURE — 94761 N-INVAS EAR/PLS OXIMETRY MLT: CPT | Mod: XB

## 2024-01-01 PROCEDURE — 92610 EVALUATE SWALLOWING FUNCTION: CPT

## 2024-01-01 PROCEDURE — B314YZZ FLUOROSCOPY OF LEFT COMMON CAROTID ARTERY USING OTHER CONTRAST: ICD-10-PCS | Performed by: PSYCHIATRY & NEUROLOGY

## 2024-01-01 PROCEDURE — 84145 PROCALCITONIN (PCT): CPT

## 2024-01-01 PROCEDURE — 86850 RBC ANTIBODY SCREEN: CPT | Performed by: NURSE PRACTITIONER

## 2024-01-01 PROCEDURE — 99223 1ST HOSP IP/OBS HIGH 75: CPT | Mod: 25,,, | Performed by: INTERNAL MEDICINE

## 2024-01-01 PROCEDURE — 99900026 HC AIRWAY MAINTENANCE (STAT)

## 2024-01-01 PROCEDURE — 43241 EGD TUBE/CATH INSERTION: CPT | Performed by: INTERNAL MEDICINE

## 2024-01-01 PROCEDURE — 82803 BLOOD GASES ANY COMBINATION: CPT

## 2024-01-01 PROCEDURE — 84132 ASSAY OF SERUM POTASSIUM: CPT | Performed by: PSYCHIATRY & NEUROLOGY

## 2024-01-01 PROCEDURE — 037L3DZ DILATION OF LEFT INTERNAL CAROTID ARTERY WITH INTRALUMINAL DEVICE, PERCUTANEOUS APPROACH: ICD-10-PCS | Performed by: PSYCHIATRY & NEUROLOGY

## 2024-01-01 PROCEDURE — 0D9680Z DRAINAGE OF STOMACH WITH DRAINAGE DEVICE, VIA NATURAL OR ARTIFICIAL OPENING ENDOSCOPIC: ICD-10-PCS | Performed by: INTERNAL MEDICINE

## 2024-01-01 PROCEDURE — 36600 WITHDRAWAL OF ARTERIAL BLOOD: CPT

## 2024-01-01 PROCEDURE — 87635 SARS-COV-2 COVID-19 AMP PRB: CPT | Performed by: PSYCHIATRY & NEUROLOGY

## 2024-01-01 PROCEDURE — 99231 SBSQ HOSP IP/OBS SF/LOW 25: CPT | Mod: ,,, | Performed by: PHYSICIAN ASSISTANT

## 2024-01-01 PROCEDURE — 84443 ASSAY THYROID STIM HORMONE: CPT | Performed by: NURSE PRACTITIONER

## 2024-01-01 PROCEDURE — 63600175 PHARM REV CODE 636 W HCPCS: Performed by: NURSE ANESTHETIST, CERTIFIED REGISTERED

## 2024-01-01 PROCEDURE — 87040 BLOOD CULTURE FOR BACTERIA: CPT | Mod: 59 | Performed by: NURSE PRACTITIONER

## 2024-01-01 PROCEDURE — 99223 1ST HOSP IP/OBS HIGH 75: CPT | Mod: ,,, | Performed by: PSYCHIATRY & NEUROLOGY

## 2024-01-01 RX ORDER — ONDANSETRON HYDROCHLORIDE 2 MG/ML
4 INJECTION, SOLUTION INTRAVENOUS EVERY 8 HOURS PRN
Status: DISCONTINUED | OUTPATIENT
Start: 2024-01-01 | End: 2024-01-01 | Stop reason: HOSPADM

## 2024-01-01 RX ORDER — BISACODYL 10 MG/1
10 SUPPOSITORY RECTAL DAILY PRN
Status: DISCONTINUED | OUTPATIENT
Start: 2024-01-01 | End: 2024-01-01

## 2024-01-01 RX ORDER — GLYCOPYRROLATE 0.2 MG/ML
0.1 INJECTION INTRAMUSCULAR; INTRAVENOUS 3 TIMES DAILY PRN
Status: DISCONTINUED | OUTPATIENT
Start: 2024-01-01 | End: 2024-02-03 | Stop reason: HOSPADM

## 2024-01-01 RX ORDER — HYDRALAZINE HYDROCHLORIDE 25 MG/1
25 TABLET, FILM COATED ORAL EVERY 8 HOURS
Status: DISCONTINUED | OUTPATIENT
Start: 2024-01-01 | End: 2024-01-01

## 2024-01-01 RX ORDER — ATORVASTATIN CALCIUM 40 MG/1
40 TABLET, FILM COATED ORAL DAILY
Status: DISCONTINUED | OUTPATIENT
Start: 2024-01-01 | End: 2024-01-01

## 2024-01-01 RX ORDER — METOPROLOL TARTRATE 1 MG/ML
5 INJECTION, SOLUTION INTRAVENOUS ONCE
Status: COMPLETED | OUTPATIENT
Start: 2024-01-01 | End: 2024-01-01

## 2024-01-01 RX ORDER — SODIUM,POTASSIUM PHOSPHATES 280-250MG
2 POWDER IN PACKET (EA) ORAL
Status: DISCONTINUED | OUTPATIENT
Start: 2024-01-01 | End: 2024-01-01

## 2024-01-01 RX ORDER — MORPHINE SULFATE 1 MG/ML
0-10 INJECTION, SOLUTION INTRAVENOUS CONTINUOUS
Status: DISCONTINUED | OUTPATIENT
Start: 2024-01-01 | End: 2024-02-03 | Stop reason: HOSPADM

## 2024-01-01 RX ORDER — NAPROXEN SODIUM 220 MG/1
81 TABLET, FILM COATED ORAL DAILY
Status: CANCELLED | OUTPATIENT
Start: 2024-01-01

## 2024-01-01 RX ORDER — METOPROLOL TARTRATE 25 MG/1
12.5 TABLET ORAL 2 TIMES DAILY
Status: DISCONTINUED | OUTPATIENT
Start: 2024-01-01 | End: 2024-01-01

## 2024-01-01 RX ORDER — CLOPIDOGREL BISULFATE 75 MG/1
150 TABLET ORAL ONCE
Status: DISCONTINUED | OUTPATIENT
Start: 2024-01-01 | End: 2024-01-01

## 2024-01-01 RX ORDER — METOPROLOL TARTRATE 1 MG/ML
INJECTION, SOLUTION INTRAVENOUS
Status: DISCONTINUED
Start: 2024-01-01 | End: 2024-01-01 | Stop reason: WASHOUT

## 2024-01-01 RX ORDER — ASPIRIN 325 MG
325 TABLET ORAL DAILY
Status: DISCONTINUED | OUTPATIENT
Start: 2024-01-01 | End: 2024-01-01

## 2024-01-01 RX ORDER — SODIUM CHLORIDE FOR INHALATION 3 %
4 VIAL, NEBULIZER (ML) INHALATION EVERY 6 HOURS
Status: DISCONTINUED | OUTPATIENT
Start: 2024-01-01 | End: 2024-01-01

## 2024-01-01 RX ORDER — ASPIRIN 300 MG/1
300 SUPPOSITORY RECTAL DAILY
Status: DISCONTINUED | OUTPATIENT
Start: 2024-01-01 | End: 2024-01-01

## 2024-01-01 RX ORDER — GLYCOPYRROLATE 0.2 MG/ML
0.1 INJECTION INTRAMUSCULAR; INTRAVENOUS 3 TIMES DAILY PRN
Status: CANCELLED | OUTPATIENT
Start: 2024-01-01

## 2024-01-01 RX ORDER — MAGNESIUM SULFATE HEPTAHYDRATE 40 MG/ML
2 INJECTION, SOLUTION INTRAVENOUS ONCE
Status: COMPLETED | OUTPATIENT
Start: 2024-01-01 | End: 2024-01-01

## 2024-01-01 RX ORDER — MORPHINE SULFATE/0.9% NACL/PF 1 MG/ML
1 PLASTIC BAG, INJECTION (ML) INTRAVENOUS CONTINUOUS
Status: CANCELLED | OUTPATIENT
Start: 2024-01-01

## 2024-01-01 RX ORDER — INSULIN ASPART 100 [IU]/ML
2 INJECTION, SOLUTION INTRAVENOUS; SUBCUTANEOUS EVERY 6 HOURS
Status: DISCONTINUED | OUTPATIENT
Start: 2024-01-01 | End: 2024-01-01

## 2024-01-01 RX ORDER — FENTANYL CITRATE 50 UG/ML
12.5 INJECTION, SOLUTION INTRAMUSCULAR; INTRAVENOUS ONCE
Status: DISCONTINUED | OUTPATIENT
Start: 2024-01-01 | End: 2024-01-01

## 2024-01-01 RX ORDER — NOREPINEPHRINE BITARTRATE/D5W 4MG/250ML
0-3 PLASTIC BAG, INJECTION (ML) INTRAVENOUS CONTINUOUS
Status: DISCONTINUED | OUTPATIENT
Start: 2024-01-01 | End: 2024-01-01

## 2024-01-01 RX ORDER — METOPROLOL TARTRATE 1 MG/ML
5 INJECTION, SOLUTION INTRAVENOUS EVERY 4 HOURS
Status: DISCONTINUED | OUTPATIENT
Start: 2024-01-01 | End: 2024-01-01

## 2024-01-01 RX ORDER — SOTALOL HYDROCHLORIDE 160 MG/1
160 TABLET ORAL 2 TIMES DAILY
COMMUNITY
Start: 2023-01-01

## 2024-01-01 RX ORDER — LIDOCAINE HYDROCHLORIDE 20 MG/ML
INJECTION, SOLUTION EPIDURAL; INFILTRATION; INTRACAUDAL; PERINEURAL
Status: DISCONTINUED | OUTPATIENT
Start: 2024-01-01 | End: 2024-01-01

## 2024-01-01 RX ORDER — DILTIAZEM HYDROCHLORIDE 30 MG/1
30 TABLET, FILM COATED ORAL EVERY 8 HOURS
Status: DISCONTINUED | OUTPATIENT
Start: 2024-01-01 | End: 2024-01-01

## 2024-01-01 RX ORDER — HYDRALAZINE HYDROCHLORIDE 20 MG/ML
10 INJECTION INTRAMUSCULAR; INTRAVENOUS EVERY 4 HOURS PRN
Status: DISCONTINUED | OUTPATIENT
Start: 2024-01-01 | End: 2024-01-01

## 2024-01-01 RX ORDER — HEPARIN SODIUM 5000 [USP'U]/ML
5000 INJECTION, SOLUTION INTRAVENOUS; SUBCUTANEOUS EVERY 8 HOURS
Status: DISCONTINUED | OUTPATIENT
Start: 2024-01-01 | End: 2024-01-01

## 2024-01-01 RX ORDER — METOPROLOL TARTRATE 25 MG/1
25 TABLET, FILM COATED ORAL ONCE
Status: COMPLETED | OUTPATIENT
Start: 2024-01-01 | End: 2024-01-01

## 2024-01-01 RX ORDER — OMEPRAZOLE 20 MG/1
20 CAPSULE, DELAYED RELEASE ORAL DAILY
COMMUNITY
Start: 2023-01-01

## 2024-01-01 RX ORDER — METOPROLOL TARTRATE 1 MG/ML
INJECTION, SOLUTION INTRAVENOUS
Status: COMPLETED
Start: 2024-01-01 | End: 2024-01-01

## 2024-01-01 RX ORDER — METOPROLOL TARTRATE 50 MG/1
50 TABLET ORAL 4 TIMES DAILY
Status: DISCONTINUED | OUTPATIENT
Start: 2024-01-01 | End: 2024-01-01

## 2024-01-01 RX ORDER — ACETAMINOPHEN 325 MG/1
650 TABLET ORAL EVERY 6 HOURS PRN
Status: DISCONTINUED | OUTPATIENT
Start: 2024-01-01 | End: 2024-01-01 | Stop reason: HOSPADM

## 2024-01-01 RX ORDER — DEXAMETHASONE SODIUM PHOSPHATE 4 MG/ML
INJECTION, SOLUTION INTRA-ARTICULAR; INTRALESIONAL; INTRAMUSCULAR; INTRAVENOUS; SOFT TISSUE
Status: DISCONTINUED | OUTPATIENT
Start: 2024-01-01 | End: 2024-01-01

## 2024-01-01 RX ORDER — CLOPIDOGREL BISULFATE 75 MG/1
75 TABLET ORAL DAILY
Status: CANCELLED | OUTPATIENT
Start: 2024-01-01

## 2024-01-01 RX ORDER — LABETALOL HCL 20 MG/4 ML
10 SYRINGE (ML) INTRAVENOUS EVERY 4 HOURS PRN
Status: DISCONTINUED | OUTPATIENT
Start: 2024-01-01 | End: 2024-01-01

## 2024-01-01 RX ORDER — METOPROLOL TARTRATE 25 MG/1
25 TABLET, FILM COATED ORAL EVERY 6 HOURS
Status: DISCONTINUED | OUTPATIENT
Start: 2024-01-01 | End: 2024-01-01

## 2024-01-01 RX ORDER — LORAZEPAM 2 MG/ML
1 INJECTION INTRAMUSCULAR EVERY 4 HOURS PRN
Status: DISCONTINUED | OUTPATIENT
Start: 2024-01-01 | End: 2024-02-03 | Stop reason: HOSPADM

## 2024-01-01 RX ORDER — SODIUM CHLORIDE 0.9 % (FLUSH) 0.9 %
10 SYRINGE (ML) INJECTION
Status: DISCONTINUED | OUTPATIENT
Start: 2024-01-01 | End: 2024-01-01

## 2024-01-01 RX ORDER — LORAZEPAM 2 MG/ML
0.5 INJECTION INTRAMUSCULAR
Status: DISCONTINUED | OUTPATIENT
Start: 2024-01-01 | End: 2024-01-01 | Stop reason: HOSPADM

## 2024-01-01 RX ORDER — FUROSEMIDE 20 MG/1
20 TABLET ORAL DAILY PRN
COMMUNITY
Start: 2024-01-01

## 2024-01-01 RX ORDER — LABETALOL HYDROCHLORIDE 5 MG/ML
INJECTION, SOLUTION INTRAVENOUS
Status: DISCONTINUED | OUTPATIENT
Start: 2024-01-01 | End: 2024-01-01

## 2024-01-01 RX ORDER — LORAZEPAM 2 MG/ML
0.5 INJECTION INTRAMUSCULAR EVERY 30 MIN PRN
Status: DISCONTINUED | OUTPATIENT
Start: 2024-01-01 | End: 2024-02-03 | Stop reason: HOSPADM

## 2024-01-01 RX ORDER — LORAZEPAM 2 MG/ML
0.5 INJECTION INTRAMUSCULAR
Status: CANCELLED | OUTPATIENT
Start: 2024-01-01

## 2024-01-01 RX ORDER — CLOPIDOGREL BISULFATE 75 MG/1
75 TABLET ORAL DAILY
Status: DISCONTINUED | OUTPATIENT
Start: 2024-01-01 | End: 2024-01-01

## 2024-01-01 RX ORDER — ROCURONIUM BROMIDE 10 MG/ML
INJECTION, SOLUTION INTRAVENOUS
Status: DISCONTINUED | OUTPATIENT
Start: 2024-01-01 | End: 2024-01-01

## 2024-01-01 RX ORDER — VASOPRESSIN 20 [USP'U]/ML
INJECTION, SOLUTION INTRAMUSCULAR; SUBCUTANEOUS
Status: DISCONTINUED | OUTPATIENT
Start: 2024-01-01 | End: 2024-01-01

## 2024-01-01 RX ORDER — ASPIRIN 300 MG/1
300 SUPPOSITORY RECTAL ONCE
Status: COMPLETED | OUTPATIENT
Start: 2024-01-01 | End: 2024-01-01

## 2024-01-01 RX ORDER — LIDOCAINE HYDROCHLORIDE 20 MG/ML
INJECTION INTRAVENOUS
Status: DISCONTINUED | OUTPATIENT
Start: 2024-01-01 | End: 2024-01-01

## 2024-01-01 RX ORDER — PROPOFOL 10 MG/ML
VIAL (ML) INTRAVENOUS
Status: DISCONTINUED | OUTPATIENT
Start: 2024-01-01 | End: 2024-01-01

## 2024-01-01 RX ORDER — SODIUM CHLORIDE 9 MG/ML
INJECTION, SOLUTION INTRAVENOUS CONTINUOUS
Status: DISCONTINUED | OUTPATIENT
Start: 2024-01-01 | End: 2024-01-01

## 2024-01-01 RX ORDER — DILTIAZEM HYDROCHLORIDE 5 MG/ML
0.25 INJECTION INTRAVENOUS ONCE
Status: COMPLETED | OUTPATIENT
Start: 2024-01-01 | End: 2024-01-01

## 2024-01-01 RX ORDER — MAGNESIUM SULFATE HEPTAHYDRATE 40 MG/ML
INJECTION, SOLUTION INTRAVENOUS
Status: COMPLETED
Start: 2024-01-01 | End: 2024-01-01

## 2024-01-01 RX ORDER — MORPHINE SULFATE/0.9% NACL/PF 1 MG/ML
1 PLASTIC BAG, INJECTION (ML) INTRAVENOUS CONTINUOUS
Status: DISCONTINUED | OUTPATIENT
Start: 2024-01-01 | End: 2024-01-01 | Stop reason: HOSPADM

## 2024-01-01 RX ORDER — FUROSEMIDE 10 MG/ML
20 INJECTION INTRAMUSCULAR; INTRAVENOUS ONCE
Status: COMPLETED | OUTPATIENT
Start: 2024-01-01 | End: 2024-01-01

## 2024-01-01 RX ORDER — RIVAROXABAN 15 MG/1
15 TABLET, FILM COATED ORAL DAILY
COMMUNITY
Start: 2023-01-01

## 2024-01-01 RX ORDER — INSULIN ASPART 100 [IU]/ML
0-5 INJECTION, SOLUTION INTRAVENOUS; SUBCUTANEOUS EVERY 6 HOURS PRN
Status: DISCONTINUED | OUTPATIENT
Start: 2024-01-01 | End: 2024-01-01

## 2024-01-01 RX ORDER — NICARDIPINE HYDROCHLORIDE 0.2 MG/ML
0-15 INJECTION INTRAVENOUS CONTINUOUS
Status: DISCONTINUED | OUTPATIENT
Start: 2024-01-01 | End: 2024-01-01

## 2024-01-01 RX ORDER — ONDANSETRON HYDROCHLORIDE 2 MG/ML
INJECTION, SOLUTION INTRAVENOUS
Status: DISCONTINUED | OUTPATIENT
Start: 2024-01-01 | End: 2024-01-01

## 2024-01-01 RX ORDER — NAPROXEN SODIUM 220 MG/1
81 TABLET, FILM COATED ORAL DAILY
Status: DISCONTINUED | OUTPATIENT
Start: 2024-01-01 | End: 2024-01-01

## 2024-01-01 RX ORDER — GLUCAGON 1 MG
1 KIT INJECTION
Status: DISCONTINUED | OUTPATIENT
Start: 2024-01-01 | End: 2024-01-01

## 2024-01-01 RX ORDER — MORPHINE SULFATE 2 MG/ML
1 INJECTION, SOLUTION INTRAMUSCULAR; INTRAVENOUS EVERY 4 HOURS PRN
Status: DISCONTINUED | OUTPATIENT
Start: 2024-01-01 | End: 2024-01-01

## 2024-01-01 RX ORDER — MUPIROCIN 20 MG/G
OINTMENT TOPICAL 2 TIMES DAILY
Status: DISCONTINUED | OUTPATIENT
Start: 2024-01-01 | End: 2024-01-01

## 2024-01-01 RX ORDER — GLYCOPYRROLATE 0.2 MG/ML
0.1 INJECTION INTRAMUSCULAR; INTRAVENOUS 3 TIMES DAILY PRN
Status: DISCONTINUED | OUTPATIENT
Start: 2024-01-01 | End: 2024-01-01 | Stop reason: HOSPADM

## 2024-01-01 RX ORDER — OXYMETAZOLINE HCL 0.05 %
2 SPRAY, NON-AEROSOL (ML) NASAL 2 TIMES DAILY PRN
Status: DISPENSED | OUTPATIENT
Start: 2024-01-01 | End: 2024-01-01

## 2024-01-01 RX ORDER — PHENYLEPHRINE HYDROCHLORIDE 10 MG/ML
INJECTION INTRAVENOUS
Status: DISCONTINUED | OUTPATIENT
Start: 2024-01-01 | End: 2024-01-01

## 2024-01-01 RX ORDER — ONDANSETRON HYDROCHLORIDE 2 MG/ML
8 INJECTION, SOLUTION INTRAVENOUS EVERY 8 HOURS PRN
Status: DISCONTINUED | OUTPATIENT
Start: 2024-01-01 | End: 2024-02-03 | Stop reason: HOSPADM

## 2024-01-01 RX ORDER — INSULIN ASPART 100 [IU]/ML
0-10 INJECTION, SOLUTION INTRAVENOUS; SUBCUTANEOUS EVERY 6 HOURS PRN
Status: DISCONTINUED | OUTPATIENT
Start: 2024-01-01 | End: 2024-01-01

## 2024-01-01 RX ORDER — FENTANYL CITRATE 50 UG/ML
12.5 INJECTION, SOLUTION INTRAMUSCULAR; INTRAVENOUS ONCE
Status: COMPLETED | OUTPATIENT
Start: 2024-01-01 | End: 2024-01-01

## 2024-01-01 RX ORDER — SUCCINYLCHOLINE CHLORIDE 20 MG/ML
INJECTION INTRAMUSCULAR; INTRAVENOUS
Status: DISCONTINUED | OUTPATIENT
Start: 2024-01-01 | End: 2024-01-01

## 2024-01-01 RX ORDER — METOPROLOL TARTRATE 25 MG/1
25 TABLET, FILM COATED ORAL 4 TIMES DAILY
Status: DISCONTINUED | OUTPATIENT
Start: 2024-01-01 | End: 2024-01-01

## 2024-01-01 RX ORDER — INSULIN ASPART 100 [IU]/ML
2 INJECTION, SOLUTION INTRAVENOUS; SUBCUTANEOUS
Status: DISCONTINUED | OUTPATIENT
Start: 2024-01-01 | End: 2024-01-01

## 2024-01-01 RX ORDER — DILTIAZEM HYDROCHLORIDE 240 MG/1
240 CAPSULE, COATED, EXTENDED RELEASE ORAL DAILY
COMMUNITY
Start: 2023-01-01

## 2024-01-01 RX ADMIN — VASOPRESSIN 2 UNITS: 20 INJECTION INTRAVENOUS at 03:01

## 2024-01-01 RX ADMIN — ACETAMINOPHEN 650 MG: 325 TABLET ORAL at 10:01

## 2024-01-01 RX ADMIN — POTASSIUM BICARBONATE 50 MEQ: 978 TABLET, EFFERVESCENT ORAL at 02:02

## 2024-01-01 RX ADMIN — HEPARIN SODIUM 5000 UNITS: 5000 INJECTION INTRAVENOUS; SUBCUTANEOUS at 05:01

## 2024-01-01 RX ADMIN — SODIUM CHLORIDE SOLN NEBU 3% 4 ML: 3 NEBU SOLN at 12:01

## 2024-01-01 RX ADMIN — PROPOFOL 30 MG: 10 INJECTION, EMULSION INTRAVENOUS at 10:01

## 2024-01-01 RX ADMIN — PHENYLEPHRINE HYDROCHLORIDE 100 MCG: 10 INJECTION INTRAVENOUS at 03:01

## 2024-01-01 RX ADMIN — MUPIROCIN: 20 OINTMENT TOPICAL at 08:01

## 2024-01-01 RX ADMIN — INSULIN ASPART 2 UNITS: 100 INJECTION, SOLUTION INTRAVENOUS; SUBCUTANEOUS at 06:01

## 2024-01-01 RX ADMIN — METOROPROLOL TARTRATE 5 MG: 5 INJECTION, SOLUTION INTRAVENOUS at 09:01

## 2024-01-01 RX ADMIN — CLOPIDOGREL BISULFATE 75 MG: 75 TABLET ORAL at 01:01

## 2024-01-01 RX ADMIN — POTASSIUM BICARBONATE 50 MEQ: 978 TABLET, EFFERVESCENT ORAL at 02:01

## 2024-01-01 RX ADMIN — INSULIN ASPART 4 UNITS: 100 INJECTION, SOLUTION INTRAVENOUS; SUBCUTANEOUS at 06:01

## 2024-01-01 RX ADMIN — PHENYLEPHRINE HYDROCHLORIDE 600 MCG: 10 INJECTION INTRAVENOUS at 03:01

## 2024-01-01 RX ADMIN — METOPROLOL TARTRATE 50 MG: 50 TABLET, FILM COATED ORAL at 09:02

## 2024-01-01 RX ADMIN — PIPERACILLIN SODIUM AND TAZOBACTAM SODIUM 4.5 G: 4; .5 INJECTION, POWDER, FOR SOLUTION INTRAVENOUS at 05:02

## 2024-01-01 RX ADMIN — LORAZEPAM 1 MG: 2 INJECTION INTRAMUSCULAR; INTRAVENOUS at 11:02

## 2024-01-01 RX ADMIN — CLOPIDOGREL BISULFATE 75 MG: 75 TABLET ORAL at 08:01

## 2024-01-01 RX ADMIN — LABETALOL HYDROCHLORIDE 10 MG: 5 INJECTION, SOLUTION INTRAVENOUS at 08:01

## 2024-01-01 RX ADMIN — ASPIRIN 81 MG CHEWABLE TABLET 81 MG: 81 TABLET CHEWABLE at 08:01

## 2024-01-01 RX ADMIN — HEPARIN SODIUM 5000 UNITS: 5000 INJECTION INTRAVENOUS; SUBCUTANEOUS at 01:01

## 2024-01-01 RX ADMIN — PIPERACILLIN SODIUM AND TAZOBACTAM SODIUM 4.5 G: 4; .5 INJECTION, POWDER, FOR SOLUTION INTRAVENOUS at 02:01

## 2024-01-01 RX ADMIN — HYDRALAZINE HYDROCHLORIDE 25 MG: 25 TABLET ORAL at 06:01

## 2024-01-01 RX ADMIN — MUPIROCIN: 20 OINTMENT TOPICAL at 09:01

## 2024-01-01 RX ADMIN — ACETAMINOPHEN 650 MG: 325 TABLET ORAL at 01:01

## 2024-01-01 RX ADMIN — LABETALOL HYDROCHLORIDE 10 MG: 5 INJECTION, SOLUTION INTRAVENOUS at 03:01

## 2024-01-01 RX ADMIN — POTASSIUM & SODIUM PHOSPHATES POWDER PACK 280-160-250 MG 2 PACKET: 280-160-250 PACK at 05:01

## 2024-01-01 RX ADMIN — INSULIN ASPART 1 UNITS: 100 INJECTION, SOLUTION INTRAVENOUS; SUBCUTANEOUS at 12:01

## 2024-01-01 RX ADMIN — LORAZEPAM 1 MG: 2 INJECTION INTRAMUSCULAR; INTRAVENOUS at 03:02

## 2024-01-01 RX ADMIN — INSULIN ASPART 6 UNITS: 100 INJECTION, SOLUTION INTRAVENOUS; SUBCUTANEOUS at 05:01

## 2024-01-01 RX ADMIN — METOPROLOL TARTRATE 25 MG: 25 TABLET, FILM COATED ORAL at 10:01

## 2024-01-01 RX ADMIN — POTASSIUM & SODIUM PHOSPHATES POWDER PACK 280-160-250 MG 2 PACKET: 280-160-250 PACK at 08:01

## 2024-01-01 RX ADMIN — HEPARIN SODIUM 5000 UNITS: 5000 INJECTION INTRAVENOUS; SUBCUTANEOUS at 10:01

## 2024-01-01 RX ADMIN — LABETALOL HYDROCHLORIDE 10 MG: 5 INJECTION, SOLUTION INTRAVENOUS at 04:01

## 2024-01-01 RX ADMIN — CLOPIDOGREL BISULFATE 75 MG: 75 TABLET ORAL at 09:02

## 2024-01-01 RX ADMIN — METOPROLOL TARTRATE 50 MG: 50 TABLET, FILM COATED ORAL at 01:02

## 2024-01-01 RX ADMIN — VANCOMYCIN HYDROCHLORIDE 1000 MG: 1 INJECTION, POWDER, LYOPHILIZED, FOR SOLUTION INTRAVENOUS at 01:02

## 2024-01-01 RX ADMIN — METOROPROLOL TARTRATE 5 MG: 5 INJECTION, SOLUTION INTRAVENOUS at 06:01

## 2024-01-01 RX ADMIN — HYDRALAZINE HYDROCHLORIDE 25 MG: 25 TABLET ORAL at 02:01

## 2024-01-01 RX ADMIN — HEPARIN SODIUM 5000 UNITS: 5000 INJECTION INTRAVENOUS; SUBCUTANEOUS at 06:01

## 2024-01-01 RX ADMIN — METOPROLOL TARTRATE 25 MG: 25 TABLET, FILM COATED ORAL at 08:01

## 2024-01-01 RX ADMIN — VANCOMYCIN HYDROCHLORIDE 1500 MG: 1.5 INJECTION, POWDER, LYOPHILIZED, FOR SOLUTION INTRAVENOUS at 01:01

## 2024-01-01 RX ADMIN — HYDRALAZINE HYDROCHLORIDE 25 MG: 25 TABLET ORAL at 01:02

## 2024-01-01 RX ADMIN — HYDRALAZINE HYDROCHLORIDE 25 MG: 25 TABLET ORAL at 01:01

## 2024-01-01 RX ADMIN — VASOPRESSIN 1 UNITS: 20 INJECTION INTRAVENOUS at 03:01

## 2024-01-01 RX ADMIN — ONDANSETRON 4 MG: 2 INJECTION INTRAMUSCULAR; INTRAVENOUS at 03:01

## 2024-01-01 RX ADMIN — DILTIAZEM HYDROCHLORIDE 30 MG: 30 TABLET, FILM COATED ORAL at 01:02

## 2024-01-01 RX ADMIN — NICARDIPINE HYDROCHLORIDE 2.5 MG/HR: 0.2 INJECTION, SOLUTION INTRAVENOUS at 08:01

## 2024-01-01 RX ADMIN — HEPARIN SODIUM 5000 UNITS: 5000 INJECTION INTRAVENOUS; SUBCUTANEOUS at 05:02

## 2024-01-01 RX ADMIN — LABETALOL HYDROCHLORIDE 10 MG: 5 INJECTION, SOLUTION INTRAVENOUS at 03:02

## 2024-01-01 RX ADMIN — FUROSEMIDE 20 MG: 10 INJECTION, SOLUTION INTRAVENOUS at 10:02

## 2024-01-01 RX ADMIN — TUBERCULIN PURIFIED PROTEIN DERIVATIVE 5 UNITS: 5 INJECTION, SOLUTION INTRADERMAL at 02:02

## 2024-01-01 RX ADMIN — DILTIAZEM HYDROCHLORIDE 2.5 MG/HR: 5 INJECTION INTRAVENOUS at 06:02

## 2024-01-01 RX ADMIN — HYDRALAZINE HYDROCHLORIDE 10 MG: 20 INJECTION, SOLUTION INTRAMUSCULAR; INTRAVENOUS at 11:01

## 2024-01-01 RX ADMIN — DEXAMETHASONE SODIUM PHOSPHATE 4 MG: 4 INJECTION, SOLUTION INTRAMUSCULAR; INTRAVENOUS at 03:01

## 2024-01-01 RX ADMIN — INSULIN ASPART 2 UNITS: 100 INJECTION, SOLUTION INTRAVENOUS; SUBCUTANEOUS at 05:01

## 2024-01-01 RX ADMIN — LIDOCAINE HYDROCHLORIDE 100 MG: 20 INJECTION INTRAVENOUS at 10:01

## 2024-01-01 RX ADMIN — ROCURONIUM BROMIDE 50 MG: 10 INJECTION INTRAVENOUS at 03:01

## 2024-01-01 RX ADMIN — METOPROLOL TARTRATE 25 MG: 25 TABLET, FILM COATED ORAL at 09:01

## 2024-01-01 RX ADMIN — ATORVASTATIN CALCIUM 40 MG: 40 TABLET, FILM COATED ORAL at 08:01

## 2024-01-01 RX ADMIN — METOPROLOL TARTRATE 12.5 MG: 25 TABLET, FILM COATED ORAL at 01:01

## 2024-01-01 RX ADMIN — ATORVASTATIN CALCIUM 40 MG: 40 TABLET, FILM COATED ORAL at 01:01

## 2024-01-01 RX ADMIN — METOROPROLOL TARTRATE 5 MG: 5 INJECTION, SOLUTION INTRAVENOUS at 12:01

## 2024-01-01 RX ADMIN — PIPERACILLIN SODIUM AND TAZOBACTAM SODIUM 4.5 G: 4; .5 INJECTION, POWDER, FOR SOLUTION INTRAVENOUS at 05:01

## 2024-01-01 RX ADMIN — MUPIROCIN: 20 OINTMENT TOPICAL at 10:01

## 2024-01-01 RX ADMIN — PIPERACILLIN SODIUM AND TAZOBACTAM SODIUM 4.5 G: 4; .5 INJECTION, POWDER, FOR SOLUTION INTRAVENOUS at 09:01

## 2024-01-01 RX ADMIN — ATORVASTATIN CALCIUM 40 MG: 40 TABLET, FILM COATED ORAL at 09:02

## 2024-01-01 RX ADMIN — HEPARIN SODIUM 5000 UNITS: 5000 INJECTION INTRAVENOUS; SUBCUTANEOUS at 02:01

## 2024-01-01 RX ADMIN — HYDRALAZINE HYDROCHLORIDE 25 MG: 25 TABLET ORAL at 09:01

## 2024-01-01 RX ADMIN — SUCCINYLCHOLINE CHLORIDE 100 MG: 20 INJECTION, SOLUTION INTRAMUSCULAR; INTRAVENOUS at 03:01

## 2024-01-01 RX ADMIN — PIPERACILLIN SODIUM AND TAZOBACTAM SODIUM 4.5 G: 4; .5 INJECTION, POWDER, FOR SOLUTION INTRAVENOUS at 02:02

## 2024-01-01 RX ADMIN — METOPROLOL TARTRATE 25 MG: 25 TABLET, FILM COATED ORAL at 01:01

## 2024-01-01 RX ADMIN — HEPARIN SODIUM 5000 UNITS: 5000 INJECTION INTRAVENOUS; SUBCUTANEOUS at 01:02

## 2024-01-01 RX ADMIN — MUPIROCIN: 20 OINTMENT TOPICAL at 09:02

## 2024-01-01 RX ADMIN — INSULIN ASPART 8 UNITS: 100 INJECTION, SOLUTION INTRAVENOUS; SUBCUTANEOUS at 12:02

## 2024-01-01 RX ADMIN — INSULIN ASPART 3 UNITS: 100 INJECTION, SOLUTION INTRAVENOUS; SUBCUTANEOUS at 12:02

## 2024-01-01 RX ADMIN — NICARDIPINE HYDROCHLORIDE 2.5 MG/HR: 0.2 INJECTION, SOLUTION INTRAVENOUS at 07:01

## 2024-01-01 RX ADMIN — SODIUM CHLORIDE 500 ML: 9 INJECTION, SOLUTION INTRAVENOUS at 06:01

## 2024-01-01 RX ADMIN — SODIUM CHLORIDE SOLN NEBU 3% 4 ML: 3 NEBU SOLN at 08:02

## 2024-01-01 RX ADMIN — PHENYLEPHRINE HYDROCHLORIDE 200 MCG: 10 INJECTION INTRAVENOUS at 03:01

## 2024-01-01 RX ADMIN — MAGNESIUM SULFATE HEPTAHYDRATE 2 G: 40 INJECTION, SOLUTION INTRAVENOUS at 12:01

## 2024-01-01 RX ADMIN — HEPARIN SODIUM 5000 UNITS: 5000 INJECTION INTRAVENOUS; SUBCUTANEOUS at 09:01

## 2024-01-01 RX ADMIN — LIDOCAINE HYDROCHLORIDE 100 MG: 20 INJECTION, SOLUTION EPIDURAL; INFILTRATION; INTRACAUDAL; PERINEURAL at 03:01

## 2024-01-01 RX ADMIN — HEPARIN SODIUM 5000 UNITS: 5000 INJECTION INTRAVENOUS; SUBCUTANEOUS at 04:01

## 2024-01-01 RX ADMIN — METOPROLOL TARTRATE 5 MG: 1 INJECTION, SOLUTION INTRAVENOUS at 06:01

## 2024-01-01 RX ADMIN — INSULIN ASPART 2 UNITS: 100 INJECTION, SOLUTION INTRAVENOUS; SUBCUTANEOUS at 12:02

## 2024-01-01 RX ADMIN — INSULIN ASPART 8 UNITS: 100 INJECTION, SOLUTION INTRAVENOUS; SUBCUTANEOUS at 06:02

## 2024-01-01 RX ADMIN — METOPROLOL TARTRATE 50 MG: 50 TABLET, FILM COATED ORAL at 04:01

## 2024-01-01 RX ADMIN — LABETALOL HYDROCHLORIDE 10 MG: 5 INJECTION, SOLUTION INTRAVENOUS at 02:01

## 2024-01-01 RX ADMIN — METOROPROLOL TARTRATE 5 MG: 5 INJECTION, SOLUTION INTRAVENOUS at 04:01

## 2024-01-01 RX ADMIN — LORAZEPAM 0.5 MG: 2 INJECTION INTRAMUSCULAR; INTRAVENOUS at 06:02

## 2024-01-01 RX ADMIN — SUGAMMADEX 500 MG: 100 INJECTION, SOLUTION INTRAVENOUS at 04:01

## 2024-01-01 RX ADMIN — VANCOMYCIN HYDROCHLORIDE 1000 MG: 1 INJECTION, POWDER, LYOPHILIZED, FOR SOLUTION INTRAVENOUS at 12:01

## 2024-01-01 RX ADMIN — POTASSIUM & SODIUM PHOSPHATES POWDER PACK 280-160-250 MG 2 PACKET: 280-160-250 PACK at 10:01

## 2024-01-01 RX ADMIN — PROPOFOL 50 MG: 10 INJECTION, EMULSION INTRAVENOUS at 10:01

## 2024-01-01 RX ADMIN — GLYCOPYRROLATE 0.2 MG: 0.2 INJECTION, SOLUTION INTRAMUSCULAR; INTRAVENOUS at 03:01

## 2024-01-01 RX ADMIN — METOPROLOL TARTRATE 50 MG: 50 TABLET, FILM COATED ORAL at 09:01

## 2024-01-01 RX ADMIN — SODIUM CHLORIDE SOLN NEBU 3% 4 ML: 3 NEBU SOLN at 12:02

## 2024-01-01 RX ADMIN — Medication 1 MG/HR: at 07:02

## 2024-01-01 RX ADMIN — HYDRALAZINE HYDROCHLORIDE 10 MG: 20 INJECTION, SOLUTION INTRAMUSCULAR; INTRAVENOUS at 04:01

## 2024-01-01 RX ADMIN — PROPOFOL 100 MG: 10 INJECTION, EMULSION INTRAVENOUS at 03:01

## 2024-01-01 RX ADMIN — SODIUM CHLORIDE SOLN NEBU 3% 4 ML: 3 NEBU SOLN at 08:01

## 2024-01-01 RX ADMIN — MORPHINE SULFATE 1 MG: 2 INJECTION, SOLUTION INTRAMUSCULAR; INTRAVENOUS at 02:02

## 2024-01-01 RX ADMIN — DILTIAZEM HYDROCHLORIDE 2.5 MG/HR: 5 INJECTION INTRAVENOUS at 09:01

## 2024-01-01 RX ADMIN — LORAZEPAM 0.5 MG: 2 INJECTION INTRAMUSCULAR; INTRAVENOUS at 08:02

## 2024-01-01 RX ADMIN — ASPIRIN 300 MG: 300 SUPPOSITORY RECTAL at 06:01

## 2024-01-01 RX ADMIN — SODIUM CHLORIDE SOLN NEBU 3% 4 ML: 3 NEBU SOLN at 06:01

## 2024-01-01 RX ADMIN — GLYCOPYRROLATE 0.1 MG: 0.2 INJECTION INTRAMUSCULAR; INTRAVENOUS at 06:02

## 2024-01-01 RX ADMIN — ACETAMINOPHEN 650 MG: 325 TABLET ORAL at 04:01

## 2024-01-01 RX ADMIN — ASPIRIN 81 MG CHEWABLE TABLET 81 MG: 81 TABLET CHEWABLE at 09:02

## 2024-01-01 RX ADMIN — NICARDIPINE HYDROCHLORIDE 2.5 MG/HR: 0.2 INJECTION, SOLUTION INTRAVENOUS at 12:01

## 2024-01-01 RX ADMIN — ASPIRIN 300 MG: 300 SUPPOSITORY RECTAL at 08:01

## 2024-01-01 RX ADMIN — Medication 1 MG/HR: at 01:02

## 2024-01-01 RX ADMIN — DILTIAZEM HYDROCHLORIDE 20 MG/HR: 5 INJECTION INTRAVENOUS at 06:02

## 2024-01-01 RX ADMIN — DILTIAZEM HYDROCHLORIDE 19.5 MG: 5 INJECTION INTRAVENOUS at 01:01

## 2024-01-01 RX ADMIN — HYDRALAZINE HYDROCHLORIDE 25 MG: 25 TABLET ORAL at 12:02

## 2024-01-01 RX ADMIN — POTASSIUM & SODIUM PHOSPHATES POWDER PACK 280-160-250 MG 2 PACKET: 280-160-250 PACK at 06:01

## 2024-01-01 RX ADMIN — PROPOFOL 20 MG: 10 INJECTION, EMULSION INTRAVENOUS at 10:01

## 2024-01-01 RX ADMIN — HYDRALAZINE HYDROCHLORIDE 25 MG: 25 TABLET ORAL at 05:02

## 2024-01-01 RX ADMIN — METOPROLOL TARTRATE 50 MG: 50 TABLET, FILM COATED ORAL at 12:01

## 2024-01-01 RX ADMIN — MAGNESIUM SULFATE HEPTAHYDRATE 2 G: 40 INJECTION, SOLUTION INTRAVENOUS at 06:01

## 2024-01-01 RX ADMIN — FENTANYL CITRATE 12.5 MCG: 50 INJECTION INTRAMUSCULAR; INTRAVENOUS at 10:02

## 2024-01-01 RX ADMIN — METOPROLOL TARTRATE 25 MG: 25 TABLET, FILM COATED ORAL at 05:01

## 2024-01-01 RX ADMIN — SODIUM CHLORIDE: 9 INJECTION, SOLUTION INTRAVENOUS at 09:01

## 2024-01-01 RX ADMIN — POTASSIUM & SODIUM PHOSPHATES POWDER PACK 280-160-250 MG 2 PACKET: 280-160-250 PACK at 02:01

## 2024-01-27 PROBLEM — I63.9 APHASIA DUE TO ACUTE STROKE: Status: ACTIVE | Noted: 2024-01-01

## 2024-01-27 PROBLEM — I48.91 A-FIB: Chronic | Status: ACTIVE | Noted: 2024-01-01

## 2024-01-27 PROBLEM — R94.31 PROLONGED Q-T INTERVAL ON ECG: Status: ACTIVE | Noted: 2024-01-01

## 2024-01-27 PROBLEM — I63.9 WEAKNESS DUE TO ACUTE STROKE: Status: ACTIVE | Noted: 2024-01-01

## 2024-01-27 PROBLEM — R47.01 APHASIA DUE TO ACUTE STROKE: Status: ACTIVE | Noted: 2024-01-01

## 2024-01-27 PROBLEM — I10 HTN (HYPERTENSION): Chronic | Status: ACTIVE | Noted: 2024-01-01

## 2024-01-27 PROBLEM — R29.818 NEUROLOGIC DEFICIT DUE TO ACUTE ISCHEMIC STROKE: Status: ACTIVE | Noted: 2024-01-01

## 2024-01-27 PROBLEM — I63.412 EMBOLIC STROKE INVOLVING LEFT MIDDLE CEREBRAL ARTERY: Status: ACTIVE | Noted: 2024-01-01

## 2024-01-27 PROBLEM — I48.91 A-FIB: Status: ACTIVE | Noted: 2024-01-01

## 2024-01-27 PROBLEM — R53.81 DEBILITY: Status: ACTIVE | Noted: 2024-01-01

## 2024-01-27 PROBLEM — G81.91 RIGHT HEMIPLEGIA: Status: ACTIVE | Noted: 2024-01-01

## 2024-01-27 PROBLEM — R53.1 WEAKNESS DUE TO ACUTE STROKE: Status: ACTIVE | Noted: 2024-01-01

## 2024-01-27 PROBLEM — E78.5 HLD (HYPERLIPIDEMIA): Chronic | Status: ACTIVE | Noted: 2024-01-01

## 2024-01-27 NOTE — CONSULTS
Consulted for angiogram due to L MCA occlusion.  Emergency consent done.    Plan for angio w thrombectomy.  Anesthesia sedation.    ASA: 4  Mall: 3        Gaurav Doan MD  Vascular and Interventional Neurology  , Department of Neurology  Section Head, Vascular Neurology  Departments of Neurology, Neurosurgery and Radiology  [facility] [place], [place]

## 2024-01-27 NOTE — ASSESSMENT & PLAN NOTE
Pierre Jama is a 89 y.o. male with PMH of HTN, HLD, Afib (on xarelto) that was transferred to [facility] with acute L MCA syndrome for intervention of L M1 occlusion. Patient admitted to OSH 1/25 for PNA, hypoxic respiratory failure, and sepsis. On 1/27 found to have new RSW, aphasia, L gaze. LKW midnight 1/27, OOW for TNK. Telestroke NIHSS 26. CTA with L M1 occlusion, CTH with good ASPECTS. On arrival to [facility], neuro exam stable with NIHSS 24. Patient taken immediately to IR, now s/p L M1 thrombectomy with TICI 3 reperfusion as well as L ICA angioplasty and stenting.     Patient will be admitted to [facility] for close monitoring and observation.      Antithrombotics for secondary stroke prevention: Antiplatelets: Aspirin: 81 mg daily  Clopidogrel: 75 mg daily due to angioplasty and stent placement; will need to resume AC for afib, timing TBD    Statins for secondary stroke prevention and hyperlipidemia, if present:   Statins: Atorvastatin- 40 mg daily    Aggressive risk factor modification: HTN, HLD, Diet, Exercise, Obesity, A-Fib     Rehab efforts: The patient has been evaluated by a stroke team provider and the therapy needs have been fully considered based off the presenting complaints and exam findings. The following therapy evaluations are needed: PT evaluate and treat, OT evaluate and treat, SLP evaluate and treat    Diagnostics ordered/pending: HgbA1C to assess blood glucose levels, Lipid Profile to assess cholesterol levels, MRI head without contrast to assess brain parenchyma, TTE to assess cardiac function/status , TSH to assess thyroid function    VTE prophylaxis: Heparin 5000 units SQ every 8 hours  Mechanical prophylaxis: Place SCDs    BP parameters: Infarct: Post sucessful thrombectomy, SBP <140

## 2024-01-27 NOTE — HPI
Pierre Jama is a 89 y.o. male with PMH of HTN, HLD, Afib (on xarelto) that presents as a transfer from [facility] for higher level care of acute L MCA syndrome for possible intervention of L M1 occlusion. History obtained from review of documents from OSH. He was initially admitted to OSH 1/25 for PNA, hypoxic respiratory failure, and sepsis after presenting with worsening SOB/BEARD. On morning of 1/27 patient noted to have neuro change with new RSW, aphasia, and L gaze. LKN midnight 1/27. Telestroke NIHSS 26, VAN positive. CTH/CTA at OSH with L M1 occlusion, no acute hemorrhage, favorable ASPECTS. Not candidate for TNK due to OOW. On arrival to [facility], neuro exam stable and continues to have L MCA syndrome. NIHSS 24. Patient taken immediately to IR for emergent DSA, now s/p L M1 thrombectomy with TICI 3 reperfusion as well as L ICA angioplasty and stenting. Patient will be admitted to [facility] for close monitoring and observation.

## 2024-01-27 NOTE — ANESTHESIA POSTPROCEDURE EVALUATION
Anesthesia Post Evaluation    Patient: Pierre Jama    Procedure(s) Performed: * No procedures listed *    Final Anesthesia Type: general      Patient location during evaluation: ICU  Patient participation: Yes- Able to Participate  Level of consciousness: awake  Post-procedure vital signs: reviewed and stable  Pain management: adequate  Airway patency: patent      Anesthetic complications: no      Cardiovascular status: hemodynamically stable  Respiratory status: unassisted  Follow-up not needed.                No case tracking events are documented in the log.      Pain/Khushbu Score: No data recorded

## 2024-01-27 NOTE — PROCEDURES
Radiology Post-Procedure Note    Pre Op Diagnosis: L ICA stenosis and L MCA occlusion    Post Op Diagnosis: No stenosis of ICA, TICI 3 MCA    Procedure: Cerebral angiogram, carotid angioplasty and stenting, aspiration thrombectomy L MCA    Procedure performed by: Gaurav Doan MD    Written Informed Consent Obtained: Yes    Specimen Removed: YES clot    Estimated Blood Loss: Minimal    Procedure report:     A 8F sheath was placed into the right femoral artery and a 5F Perez catheter was advanced into the aortic arch.  The left common and intenal carotid arteries were subselected and angiography of the brain was performed after injection into each of these vessels.    Preliminary interpretation: > 75% stenosis of the L ICA, treated to 0% w angioplasty and stenting; L MCA occlusion treated to TICI 3 w aspiration.  Please see Imaging report for full details.    A right femoral artery angiogram was performed, the sheath removed and hemostasis achieved using angioseal.  No hematoma was present at the time of hemostasis.    The patient tolerated the procedure well.         Gaurav Doan MD  Vascular and Interventional Neurology  , Department of Neurology  Section Head, Vascular Neurology  Departments of Neurology, Neurosurgery and Radiology  [facility] [place], [place]

## 2024-01-27 NOTE — ANESTHESIA PREPROCEDURE EVALUATION
01/27/2024  Pre-operative evaluation for * No procedures listed *    Pierre Jama is a 89 y.o. male with acute stroke    Hx of afib on anticoagulation, HTN    Per notes, admitted at outside hospital with pneumonia    Patient Active Problem List   Diagnosis    Embolic stroke involving left middle cerebral artery             Pre-op Assessment    I have reviewed the Patient Summary Reports.     I have reviewed the Nursing Notes. I have reviewed the NPO Status.      Review of Systems  Neurological:   CVA                                        Physical Exam    Airway:  Mallampati: II   Mouth Opening: Normal  Tongue: Normal    Chest/Lungs:  Normal Respiratory Rate    Heart:  Rhythm: Regular Rhythm        Anesthesia Plan  Type of Anesthesia, risks & benefits discussed:    Anesthesia Type: Gen ETT  Intra-op Monitoring Plan: Standard ASA Monitors  Induction:  IV  ASA Score: 4 Emergent    Ready For Surgery From Anesthesia Perspective.     .

## 2024-01-27 NOTE — SUBJECTIVE & OBJECTIVE
No past medical history on file.  No past surgical history on file.   No current facility-administered medications on file prior to encounter.     No current outpatient medications on file prior to encounter.      Allergies: Patient has no allergy information on record.  No family history on file.     Review of Systems   Reason unable to perform ROS: decrease LOC.     Objective:     Vitals:    Pulse: 81  BP: 114/66  MAP (mmHg): 82  Resp: (!) 21  SpO2: 95 %    Temp  Min: 97.4 °F (36.3 °C)  Max: 97.4 °F (36.3 °C)  Pulse  Min: 81  Max: 87  BP  Min: 114/66  Max: 176/98  MAP (mmHg)  Min: 82  Max: 82  Resp  Min: 16  Max: 21  SpO2  Min: 95 %  Max: 98 %    No intake/output data recorded.            Physical Exam  Vitals and nursing note reviewed.   Constitutional:       Appearance: He is ill-appearing.   HENT:      Head: Normocephalic.      Nose: Nose normal.      Mouth/Throat:      Mouth: Mucous membranes are moist.      Pharynx: Oropharynx is clear.   Eyes:      Pupils: Pupils are equal, round, and reactive to light.   Cardiovascular:      Rate and Rhythm: Normal rate and regular rhythm.      Pulses: Normal pulses.      Heart sounds: Normal heart sounds.   Pulmonary:      Effort: Pulmonary effort is normal.      Breath sounds: Normal breath sounds.   Abdominal:      General: Bowel sounds are normal.      Palpations: Abdomen is soft.   Musculoskeletal:         General: Normal range of motion.   Skin:     General: Skin is warm and dry.      Capillary Refill: Capillary refill takes 2 to 3 seconds.   Neurological:      Comments: GCS 10 no sedation  Global asphasia  PERRL  RUE WD  LUE spontanepous  RLE triple flexion  LLE WD  NIH 22 post thrombectomy         Unable to test orientation, language, memory, judgment, insight, fund of knowledge, hearing, shoulder shrug, tongue protrusion, coordination, gait due to level of consciousness.       Today I personally reviewed pertinent medications, lines/drains/airways, imaging,  cardiology results, laboratory results, microbiology results, notably:

## 2024-01-27 NOTE — HPI
Patient is an 89-year-old male with history of atrial fibrillation on anticoagulation, hypertension who presents a transfer from an outside hospital for an acute stroke needing thrombectomy.  Last known normal at 1159 pm.  He initially presented with right-sided weakness, right-sided facial droop and aphasia.  Imaging showed a large vessel occlusion so patient was transferred here for thrombectomy.  No TNK outside of window. Patient s/p thrombectomy TiCi3 with stents in left ICA. Admit to [facility] for neuro monitoring and higher level of care.

## 2024-01-27 NOTE — ED NOTES
Pt to go straight to IR per stroke team at bedside. Dr Peralta at bedside assessing patient. Unable to intake patient or get full assessment prior to patient leaving ED. Pt transported to IR with ED RN Gaston VASQUEZ

## 2024-01-27 NOTE — ASSESSMENT & PLAN NOTE
-Stroke risk factor  -SBP <140 s/p thrombectomy, maintain MAP >65  -Cardene gtt per NCC  -PRN labetalol/hydralazine

## 2024-01-27 NOTE — TELEMEDICINE CONSULT
[facility]  Vascular Neurology  Comprehensive Stroke Center  TeleVascular Neurology Acute Consultation Note        Consult Information  Consults    Consulting Provider: SCOOTER WHITE   Current Providers  No providers found    Patient Location: [facility] ED [facility] PATIENT FLOW CENTER IP Unit    Spoke hospital nurse at bedside with patient assisting consultant.  Patient information was obtained from spouse/SO and primary team.       Stroke Documentation  Acute Stroke Times   Last Known Normal Date: 01/27/24  Last Known Normal Time: 0000  Stroke Team Called Date: 01/27/24  Stroke Team Called Time: 1151  Stroke Team Arrival Date: 01/27/24  Stroke Team Arrival Time: 1152  CT Interpretation Time: 1210  Thrombolytic Therapy Recommended: No  CTA Interpretation Time: 1224  Thrombectomy Recommended: Yes  Decision to Treat Time for IR: 1224    NIH Scale:  1a. Level of Consciousness: 1-->Not alert, but arousable by minor stimulation to obey, answer, or respond  1b. LOC Questions: 2-->Answers neither question correctly  1c. LOC Commands: 2-->Performs neither task correctly  2. Best Gaze: 2-->Forced deviation, or total gaze paresis not overcome by the oculocephalic maneuver  3. Visual: 2-->Complete hemianopia  4. Facial Palsy: 2-->Partial paralysis (total or near-total paralysis of lower face)  5a. Motor Arm, Left: 0-->No drift, limb holds 90 (or 45) degrees for full 10 secs  5b. Motor Arm, Right: 4-->No movement  6a. Motor Leg, Left: 3-->No effort against gravity, leg falls to bed immediately  6b. Motor Leg, Right: 3-->No effort against gravity, leg falls to bed immediately  7. Limb Ataxia: 0-->Absent  8. Sensory: 0-->Normal, no sensory loss  9. Best Language: 3-->Mute, global aphasia, no usable speech or auditory comprehension  10. Dysarthria: 2-->Severe dysarthria, patients speech is so slurred as to be unintelligible in the absence of or out of proportion to any  dysphasia, or is mute/anarthric  11. Extinction and Inattention (formerly Neglect): 0-->No abnormality  Total (NIH Stroke Scale): 26      Modified [place]: Score: 0  [place] Coma Scale:     ABCD2 Score:    [id number] Score:    HAS -BLED Score:    ICH Score:    Hunt & Chandler Classification:      There were no vitals taken for this visit.    Van Positive    Medical Decision Making  HPI:  89 y.o. male with a history of HTN, HLD, Afib on Xarelto was currently admitted with pneumonia, hypoxic respiratory failure and sepsis. LKN was midnight.  Noted to have right-sided weakness, aphasia and left-sided gaze today by primary team.    Xarelto was discontinued upon admission for unclear reasons (per the attending physician, he had anemia upon admission but not severe enough to require blood transfusion and no history of GI bleed) Last dose was > 48 hrs ago.    /100     Images personally reviewed and interpreted:  Study: Head CT and CTA Head & Neck  Study Interpretation:  Hyperdense left distal M1.  No evidence of acute ischemia or hemorrhage.  ASPECTS 10.   CTA shows a left distal M1 occlusion along with severe ( >70%) proximal ICA stenosis bilaterally (appears slightly worse on the right)    Assessment and plan:  88 y/o male with a history of AFib on Xarelto, HTN, HLD, currently admitted with pneumonia and sepsis, who presents with a L MCA syndrome, likely in the setting of being off anticoagulation.    CT head shows no acute ischemia with favorable ASPECTS.  Hyperdense sign in the left distal M1.  CTA shows a left distal M1 occlusion..    Not a candidate for IV thrombolytics as he is OOW  Recommend STAT transfer to the closest appropriate facility for angiogram and possible EVT.  Keep permissive HTN w/ SBP <220/110  Hold off on resuming Xarelto    Discussed plan with attending physician.  IR and Vascular Neurology teams made aware.    Lytics recommendation: Thrombolytic therapy not recommended due to Outside of  treatment window   Thrombectomy recommendation: Yes  Placement recommendation: transfer to [facility] by  air  stat                Review of Systems   Unable to perform ROS: Acuity of condition     Physical Exam  Neurological:      Comments: Does not answer questions or follow commands.    Left gaze  Does not attend to the right  Right facial weakness  Flaccid in the right upper extremity.  Appears to have symmetric drift in both lower extremities and withdraws equally on both sides.       No past medical history on file.  No past surgical history on file.  No family history on file.    Diagnoses  Problem Noted   Embolic Stroke Involving Left Middle Cerebral Artery 1/27/2024       Bennie Hassan MD      Emergent/Acute neurological consultation requested by spoke provider due to critical concerns for possible cerebrovascular event that could result in permanent loss of neurologic/bodily function, severe disability or death of this patient.  Immediate/timely evaluation by a highly prepared expert is paramount for optimal outcomes  High risk for neurological deterioration if not properly diagnosed  High risk for neurological deterioration if not treated promplty/as soon as possible  Complex diagnostic evaluation may be required (advanced imaging)  High risk treatment options (thrombolytics and/or thrombectomy)    Patient care was coordinated with spoke provider, including but not limted to    Discussing likely diagnosis/etiology of symptoms  Making recommendations for further diagnostic studies  Making recommendations for intravenous thrombolytics or other advanced therapies  Making recommendations for disposition (admission/transfer for higher level of care)

## 2024-01-27 NOTE — TRANSFER OF CARE
Anesthesia Transfer of Care Note    Patient: Pierre Jama    Procedure(s) Performed: * No procedures listed *    Patient location: ICU    Anesthesia Type: general    Transport from OR: Transported from OR on 6-10 L/min O2 by face mask with adequate spontaneous ventilation. Continuous ECG monitoring in transport. Continuous SpO2 monitoring in transport    Post pain: adequate analgesia    Post assessment: no apparent anesthetic complications and tolerated procedure well    Post vital signs: stable    Level of consciousness: awake and alert    Nausea/Vomiting: no nausea/vomiting    Complications: none    Transfer of care protocol was followed      Last vitals: There were no vitals taken for this visit.

## 2024-01-27 NOTE — ED PROVIDER NOTES
Encounter Date: 1/27/2024       History     Chief Complaint   Patient presents with    Weakness     Transfer from [facility] for neuro evaluation.     HPI  Patient is an 89-year-old male with history of atrial fibrillation on anticoagulation, hypertension who presents a transfer from an outside hospital for an acute stroke needing thrombectomy.  Last known normal at 1159 pm.  He initially presented with right-sided weakness, right-sided facial droop and aphasia.  Imaging showed a large vessel occlusion so patient was transferred here for thrombectomy.  Per EMS, patient did not received TNK as he was out of the window.  I am unable to obtain any further history due to patient's clinical condition.      Review of patient's allergies indicates:  Not on File  No past medical history on file.  No past surgical history on file.  No family history on file.     Review of Systems  Unable to obtain full review of systems due to clinical condition.    Physical Exam     Initial Vitals [01/27/24 1512]   BP Pulse Resp Temp SpO2   (!) 156/90 87 18 -- 98 %      MAP       --         Physical Exam  Constitutional:  Awake, alert  HENT: Normocephalic, atraumatic  Eyes: PERRL  Respiratory: Non-labored  Cardiovascular: Well perfused  Gastrointestinal: Soft, non-tender, non-distended  Integumentary: Warm and dry  Musculoskeletal: No deformity  Neurological:  Awake, alert, aphasic, right-sided neglect, right-sided facial droop present, patient will withdraw to painful stimuli in the right upper and lower extremities but does not follow commands in the right upper and lower extremities, patient does follow commands in the left upper and lower extremities    ED Course   Procedures  Labs Reviewed - No data to display       Imaging Results    None          Medications - No data to display  Medical Decision Making  Patient is an 89-year-old male with acute stroke and large vessel occlusion he was transferred here for thrombectomy.  Neurology  already at bedside upon patient arrival.  I was able to perform a quick assessment.  They are ready for him in the IR suite so patient was quickly transferred to IR upon arrival.    Critical Care  Performed by: Brigitte Peralta MD   Authorized by: Brigitte Peralta MD    Total critical care time (exclusive of procedural time) : 12 minutes  Critical care was necessary to treat or prevent imminent or life-threatening deterioration of the following conditions:  acute CVA       Amount and/or Complexity of Data Reviewed  Independent Historian: EMS                                      Clinical Impression:  Final diagnoses:  [I63.9] Acute CVA (cerebrovascular accident)          ED Disposition Condition    Admit Stable                Brigitte Peralta MD  01/27/24 1521

## 2024-01-27 NOTE — ASSESSMENT & PLAN NOTE
Antithrombotics for secondary stroke prevention: Antiplatelets: Aspirin: 81 mg daily  Aspirin: 325 mg daily  Clopidogrel: 300 mg loading dose x 1, now  Clopidogrel: 75 mg daily    Statins for secondary stroke prevention and hyperlipidemia, if present:   Statins: Atorvastatin- 40 mg daily    Aggressive risk factor modification: A-Fib     Rehab efforts: The patient has been evaluated by a stroke team provider and the therapy needs have been fully considered based off the presenting complaints and exam findings. The following therapy evaluations are needed: PT evaluate and treat, OT evaluate and treat, SLP evaluate and treat, PM&R evaluate for appropriate placement    Diagnostics ordered/pending: MRI head without contrast to assess brain parenchyma    VTE prophylaxis: Heparin 5000 units SQ every 8 hours    BP parameters: Infarct: Post sucessful thrombectomy, SBP <140

## 2024-01-27 NOTE — SUBJECTIVE & OBJECTIVE
HPI:  89 y.o. male with a history of HTN, HLD, Afib on Xarelto was currently admitted with pneumonia, hypoxic respiratory failure and sepsis. LKN was midnight.  Noted to have right-sided weakness, aphasia and left-sided gaze today by primary team.    Xarelto was discontinued upon admission for unclear reasons (per the attending physician, he had anemia upon admission but not severe enough to require blood transfusion and no history of GI bleed) Last dose was > 48 hrs ago.    /100     Images personally reviewed and interpreted:  Study: Head CT and CTA Head & Neck  Study Interpretation:  Hyperdense left distal M1.  No evidence of acute ischemia or hemorrhage.  ASPECTS 10.   CTA shows a left distal M1 occlusion along with severe ( >70%) proximal ICA stenosis bilaterally    Assessment and plan:  88 y/o male with a history of AFib on Xarelto, HTN, HLD, currently admitted with pneumonia and sepsis, who presents with a L MCA syndrome, likely in the setting of being off anticoagulation.    CT head shows no acute ischemia with favorable ASPECTS.  Hyperdense sign in the left distal M1.  Noted to have a left distal M1 occlusion by Radiology (Images not uploaded to PACS/Easy Viz).    Not a candidate for IV thrombolytics as he is OOW  Recommend STAT transfer to the closest appropriate facility for angiogram and possible EVT.  Keep permissive HTN w/ SBP <220/110  Hold off on resuming Xarelto    Discussed plan with attending physician. PFC made aware.    Lytics recommendation: Thrombolytic therapy not recommended due to Outside of treatment window   Thrombectomy recommendation: Yes  Placement recommendation: transfer to [facility] by  air  stat

## 2024-01-27 NOTE — SUBJECTIVE & OBJECTIVE
Past Medical History:   Diagnosis Date    A-fib 01/27/2024    Diabetes mellitus     GERD (gastroesophageal reflux disease)     HLD (hyperlipidemia) 01/27/2024    HTN (hypertension) 01/27/2024     Past Surgical History:   Procedure Laterality Date    REPLACEMENT OF DUAL CHAMBER PACEMAKER GENERATOR  02/25/2020     Social History     Tobacco Use    Smoking status: Former     Types: Cigarettes   Substance Use Topics    Drug use: Never     Review of patient's allergies indicates:  Not on File    Medications: I have reviewed the current medication administration record.    No medications prior to admission.       Review of Systems   Unable to perform ROS: Other (severe aphasia)     Objective:     Vital Signs (Most Recent):  Temp: 97.5 °F (36.4 °C) (01/27/24 1650)  Pulse: 81 (01/27/24 1650)  Resp: (!) 21 (01/27/24 1650)  BP: 114/66 (01/27/24 1650)  SpO2: 95 % (01/27/24 1650)    Vital Signs Range (Last 24H):  Temp:  [97.4 °F (36.3 °C)-97.5 °F (36.4 °C)]   Pulse:  [81-87]   Resp:  [16-21]   BP: (114-176)/(66-98)   SpO2:  [95 %-98 %]        Physical Exam  Vitals and nursing note reviewed.   Constitutional:       Appearance: He is not toxic-appearing.   HENT:      Head: Normocephalic.      Nose: Nose normal.      Mouth/Throat:      Mouth: Mucous membranes are moist.   Eyes:      General: No scleral icterus.     Conjunctiva/sclera: Conjunctivae normal.   Cardiovascular:      Rate and Rhythm: Normal rate.   Pulmonary:      Effort: Pulmonary effort is normal. No respiratory distress.   Abdominal:      General: There is no distension.   Musculoskeletal:         General: No deformity or signs of injury.      Cervical back: Normal range of motion.   Skin:     General: Skin is warm.      Findings: No bruising.   Neurological:      Mental Status: He is alert.      Cranial Nerves: Dysarthria and facial asymmetry present.      Motor: Weakness present.   Psychiatric:         Speech: He is noncommunicative (severe aphasia).          "Behavior: Behavior is cooperative.              Neurological Exam:   LOC: alert  Attention Span: poor  Language: Global aphasia  Articulation: Untestable due to severe aphasia   Orientation: Untestable due to severe aphasia   Visual Fields: Hemianopsia right  EOM (CN III, IV, VI): Gaze preference  left  Facial Movement (CN VII): Lower facial weakness on the Right  Motor: Arm left  Normal 5/5  Leg left  Paresis: 2/5  Arm right  Plegia 0/5  Leg right Paresis: 2/5  Sensation: Anatoliy-hypoesthesia right      Laboratory:  CMP: No results for input(s): "GLUCOSE", "CALCIUM", "ALBUMIN", "PROT", "NA", "K", "CO2", "CL", "BUN", "CREATININE", "ALKPHOS", "ALT", "AST", "BILITOT" in the last 24 hours.  CBC: No results for input(s): "WBC", "RBC", "HGB", "HCT", "PLT", "MCV", "MCH", "MCHC" in the last 168 hours.  Lipid Panel: No results for input(s): "CHOL", "LDLCALC", "HDL", "TRIG" in the last 168 hours.  Coagulation: No results for input(s): "PT", "INR", "APTT" in the last 168 hours.  Hgb A1C: No results for input(s): "HGBA1C" in the last 168 hours.  TSH: No results for input(s): "TSH" in the last 168 hours.    Diagnostic Results:    Brain Imaging:  [facility] 1/27/2024 @ OSH  Radiology report not available. No acute ischemia with favorable ASPECTS.  Hyperdense sign in the left distal M1.       Vessel Imaging:  IR cerebral angiogram 1/27/2024  Preliminary interpretation: > 75% stenosis of the L ICA, treated to 0% w angioplasty and stenting; L MCA occlusion treated to TICI 3 w aspiration.  Please see Imaging report for full details.    CTA head/neck 1/27/2024 @ OSH  Impression:  1. There is acute clot in the left distal M1 and proximal superior branch M2 MCA with high-grade stenosis/LVO.  Moderate referral collaterals  2. Chronic LVO of the distal right vertebral artery.  High-grade stenosis of the proximal left vertebral artery greater than 70%  3. High-grade stenosis right carotid bifurcation 75%.  Moderate grade stenosis on the left " 60%

## 2024-01-27 NOTE — ASSESSMENT & PLAN NOTE
-Stroke risk factor  -On xarelto, reportedly stopped at OSH due to c/f anemia  -Will need to further discuss timing of when to resume AC for afib

## 2024-01-28 NOTE — PROGRESS NOTES
[facility] Neuro Critical Care  Vascular Neurology  Comprehensive Stroke Center  Progress Note    Assessment/Plan:     * Embolic stroke involving left middle cerebral artery  Pierre Jama is a 89 y.o. male with PMH of HTN, HLD, Afib (on xarelto) that [place] [facility] [place] syndrome for intervention of L M1 occlusion. Patient admitted to OSH 1/25 for PNA, hypoxic respiratory failure, and sepsis. On 1/27 found to have new RSW, aphasia, L gaze. LKW midnight 1/27, OOW for TNK. Telestroke NIHSS 26. CTA with L M1 occlusion, CTH with good ASPECTS. [place] [facility] [place] neuro exam stable with NIHSS 24. Patient taken immediately to IR, now s/p L M1 thrombectomy with TICI 3 reperfusion as well as L ICA angioplasty and stenting.     Continues to have significant L MCA deficits s/p thrombectomy. Overnight had episode of tachycardia in 130s, EKG with wide complex QRS likely ST. Improved with metoprolol. Continues to have intermittent episode of tachycardia per day RN. Unable to get PO access overnight due to tube coiling in stomach. Currently on ASA [place] only, will start plavix for DAPT once PO route established. Repeat CTH, echo, therapy evals pending.      Antithrombotics for secondary stroke prevention: Antiplatelets: Aspirin: 81 mg daily  Clopidogrel: 75 mg daily for DAPT due to stent once PO route established; currently on ASA 325mg [place] only     Statins for secondary stroke prevention and hyperlipidemia, if present: Statins: Atorvastatin- 40 mg daily    Aggressive risk factor modification: HTN, HLD, Diet, Exercise, Obesity, A-Fib     Rehab efforts: The patient has been evaluated by a stroke team provider and the therapy needs have been fully considered based off the presenting complaints and exam findings. The following therapy evaluations are needed: PT evaluate and treat, OT evaluate and treat, SLP evaluate and treat    Diagnostics ordered/pending: CT scan of head without contrast to asses brain parenchyma,  MRI head without contrast to assess brain parenchyma, TTE to assess cardiac function/status     VTE prophylaxis: Heparin 5000 units SQ every 8 hours  Mechanical prophylaxis: Place SCDs    BP parameters: Infarct: Post sucessful thrombectomy, SBP <140        Neurologic deficit due to acute ischemic stroke  Aphasia   Right hemiplegia   -Due to stroke  -Aggressive therapy  -PT/OT/SLP eval and treat    HLD (hyperlipidemia)  -Stroke risk factor  -  -Atorvastatin 40mg daily for LDL goal <70    HTN (hypertension)  -Stroke risk factor  -SBP <140 s/p thrombectomy, maintain MAP >65  -PRN labetalol/hydralazine    A-fib  -Stroke risk factor  -On xarelto, reportedly stopped at OSH due to c/f anemia  -Will need to further discuss timing of when to resume AC for afib         01/28/2024 Continues to have significant L MCA deficits s/p thrombectomy. Overnight had episode of tachycardia in 130s, EKG with wide complex QRS likely ST. Improved with metoprolol. Continues to have intermittent episode of tachycardia per day RN. Unable to get PO access overnight due to tube coiling in stomach. Currently on ASA [place] only, will start plavix for DAPT once PO route established. Repeat CTH, echo, therapy evals pending.    STROKE DOCUMENTATION   Acute Stroke Times   Last Known Normal Date: 01/27/24  Last Known Normal Time: 0000  Stroke Team Called Date: 01/27/24  Stroke Team Called Time: 1511  Stroke Team Arrival Date: 01/27/24  Stroke Team Arrival Time: 1511  CT Interpretation Time: 1210 (OSH imaging reviewed PTA)  Thrombolytic Therapy Recommended: No  CTA Interpretation Time: 1224 (OSH imaging reviewed PTA)  Thrombectomy Recommended: Yes  Decision to Treat Time for IR: 1511    NIH Scale:  1a. Level of Consciousness: 2-->Not alert, requires repeated stimulation to attend, or is obtunded and requires strong or painful stimulation to make movements (not stereotyped)  1b. LOC Questions: 2-->Answers neither question correctly  1c. LOC Commands:  2-->Performs neither task correctly  2. Best Gaze: 1-->Partial gaze palsy, gaze is abnormal in one or both eyes, but forced deviation or total gaze paresis is not present  3. Visual: 0-->No visual loss  4. Facial Palsy: 2-->Partial paralysis (total or near-total paralysis of lower face)  5a. Motor Arm, Left: 1-->Drift, limb holds 90 (or 45) degrees, but drifts down before full 10 seconds, does not hit bed or other support  5b. Motor Arm, Right: 3-->No effort against gravity, limb falls  6a. Motor Leg, Left: 2-->Some effort against gravity, leg falls to bed by 5 secs, but has some effort against gravity  6b. Motor Leg, Right: 3-->No effort against gravity, leg falls to bed immediately  7. Limb Ataxia: 0-->Absent  8. Sensory: 2-->Severe to total sensory loss, patient is not aware of being touched in the face, arm, and leg  9. Best Language: 3-->Mute, global aphasia, no usable speech or auditory comprehension  10. Dysarthria: 2-->Severe dysarthria, patients speech is so slurred as to be unintelligible in the absence of or out of proportion to any dysphasia, or is mute/anarthric  11. Extinction and Inattention (formerly Neglect): 0-->No abnormality  Total (NIH Stroke Scale): 25       Modified Rainier Score: 0  Clemmons Coma Scale:    ABCD2 Score:    [id number] Score:5  HAS -BLED Score:   ICH Score:   Hunt & Chandler Classification:      Hemorrhagic change of an Ischemic Stroke: Does this patient have an ischemic stroke with hemorrhagic changes? No     Neurologic Chief Complaint: L MCA syndrome + L M1 occlusion    Subjective:     Interval History: Patient is seen for follow-up neurological assessment and treatment recommendations: +  Continues to have significant L MCA deficits s/p thrombectomy. Overnight had episode of tachycardia in 130s, EKG with wide complex QRS likely ST. Improved with metoprolol. Continues to have intermittent episode of tachycardia per day RN. Unable to get PO access overnight due to tube coiling in  stomach. Currently on ASA [place] only, will start plavix for DAPT once PO route established. Repeat CTH, echo, therapy evals pending.    HPI, Past Medical, Family, and Social History remains the same as documented in the initial encounter.     Review of Systems   Unable to perform ROS: Other (severe aphasia)     Scheduled Meds:   aspirin  325 mg Per NG tube Daily    atorvastatin  40 mg Per NG tube Daily    clopidogreL  150 mg Per NG tube Once    metoprolol  5 mg Intravenous Once    mupirocin   Nasal BID     Continuous Infusions:  PRN Meds:acetaminophen, bisacodyL, hydrALAZINE, labetalol, magnesium oxide, magnesium oxide, ondansetron, oxymetazoline, potassium bicarbonate, potassium bicarbonate, potassium bicarbonate, potassium, sodium phosphates, potassium, sodium phosphates, potassium, sodium phosphates, sodium chloride 0.9%, sodium chloride 0.9%    Objective:     Vital Signs (Most Recent):  Temp: 99.6 °F (37.6 °C) (01/28/24 1105)  Pulse: (!) 131 (01/28/24 1105)  Resp: 18 (01/28/24 1105)  BP: 129/73 (01/28/24 1105)  SpO2: 98 % (01/28/24 1105)  BP Location: Left arm    Vital Signs Range (Last 24H):  Temp:  [97.4 °F (36.3 °C)-100 °F (37.8 °C)]   Pulse:  []   Resp:  [12-42]   BP: ()/(56-90)   SpO2:  [94 %-99 %]   BP Location: Left arm       Physical Exam  Vitals and nursing note reviewed.   Constitutional:       Appearance: He is not toxic-appearing.   HENT:      Head: Normocephalic.      Nose: Nose normal.      Mouth/Throat:      Mouth: Mucous membranes are moist.   Eyes:      General: No scleral icterus.     Conjunctiva/sclera: Conjunctivae normal.   Cardiovascular:      Rate and Rhythm: Normal rate.   Pulmonary:      Effort: Pulmonary effort is normal. No respiratory distress.   Abdominal:      General: There is no distension.   Musculoskeletal:         General: No deformity or signs of injury.      Cervical back: Normal range of motion.   Skin:     General: Skin is warm.      Findings: No bruising.    Neurological:      Mental Status: He is lethargic.      Cranial Nerves: Dysarthria and facial asymmetry present.      Motor: Weakness present.   Psychiatric:         Speech: He is noncommunicative (severe aphasia).         Behavior: Behavior is cooperative.              Neurological Exam:   LOC: drowsy  Attention Span: poor  Language: Global aphasia  Articulation: Untestable due to severe aphasia   Orientation: Untestable due to severe aphasia   Visual Fields: Full  EOM (CN III, IV, VI): Gaze preference  left  Facial Movement (CN VII): Lower facial weakness on the Right  Motor: Arm left  Paresis: 4/5  Leg left  Paresis: 3/5  Arm right  Plegia 0/5  Leg right Plegia 0/5  Sensation: Anatoliy-hypoesthesia right    Laboratory:  CMP:   Recent Labs   Lab 01/28/24 0330   CALCIUM 9.3   ALBUMIN 3.5   PROT 7.0      K 4.4   CO2 22*      BUN 31*   CREATININE 1.7*   ALKPHOS 95   ALT 12   AST 14   BILITOT 0.6     CBC:   Recent Labs   Lab 01/28/24 0330   WBC 5.83   RBC 4.22*   HGB 12.4*   HCT 37.8*      MCV 90   MCH 29.4   MCHC 32.8     Lipid Panel:   Recent Labs   Lab 01/27/24 2011   CHOL 220*   LDLCALC 155.6   HDL 39*   TRIG 127     Coagulation:   Recent Labs   Lab 01/27/24 2011   INR 1.1   APTT 26.9     Hgb A1C:   Recent Labs   Lab 01/28/24 0330   HGBA1C 8.2*     TSH:   Recent Labs   Lab 01/27/24 2011   TSH 1.875       Diagnostic Results     Brain Imaging:  [facility] 1/27/2024 @ OSH  Radiology report not available. No acute ischemia with favorable ASPECTS.  Hyperdense sign in the left distal M1.         Vessel Imaging:  IR cerebral angiogram 1/27/2024  Preliminary interpretation: > 75% stenosis of the L ICA, treated to 0% w angioplasty and stenting; L MCA occlusion treated to TICI 3 w aspiration.  Please see Imaging report for full details.     CTA head/neck 1/27/2024 @ OSH  Impression:  1. There is acute clot in the left distal M1 and proximal superior branch M2 MCA with high-grade stenosis/LVO.  Moderate  referral collaterals  2. Chronic LVO of the distal right vertebral artery.  High-grade stenosis of the proximal left vertebral artery greater than 70%  3. High-grade stenosis right carotid bifurcation 75%.  Moderate grade stenosis on the left 60%      Cardiac Imaging   TTE pending    Radha Britton PA-C  [facility]  Department of Vascular Neurology   [facility]

## 2024-01-28 NOTE — SIGNIFICANT EVENT
Visited bedside to discuss patient with RN, who expressed concern for lack of enteral access. NGT placement (ordered 1/27 17:31PM) was deemed medically necessary 2/2 patient's altered mentation and inability to swallow, remains pending at time of shift change. Administration of ASA/Plavix load (recommended ASAP by Neuro IR after L ICA angioplasty and stent placement) also remains pending at this time. PM bedside RN attempted NGT placement after change of shift; however, on abdominal XR, tube was noted to be coiled in esophagus. Multiple reattempts performed without success, prompting need for Jalen tube placement. First attempt unsuccessful 2/2 coiling in back of pharynx. Patient began to exhibit epistaxis from trauma of recurrent attempts. Resolved with PRN Afrin. After about one hour of NGT rest, [place] placement attempted a final time. Abdominal XR again showed coiling of tube at GE junction. Given prior traumatic insertion attempts, additional enteral access attempts deferred for now. Discussed events with NCC attending on-call, who recommended administering rectal ASA. Confirmed with Pharmacy that no available alternative to PO Plavix available. Plan for continued discussion of next steps to ensure patency of patient's new L ICA stent on AM rounds.    Patient then noted to have ventricular pacemaker with abnormal appearance of telemetry. Multiple EKGs obtained with variable results (see Muse results). VSS throughout provider observation. QTc interval consistently elevated >500. Caution to avoid QTc-prolonging agents noted in patient record. At end of shift (6:30AM 1/27), provider again notified of abnormal telemetry. Rhythm appears as a wide-complex tachycardia with RBBB. Given presence of ventricular pacemaker, advised by NCC attending to continue close monitoring for now.     RN called TONY to bedside for episode of acute SpO2 decrease to 50's. Patient tachypneic, uncomfortable; breathing with pursed lips and neck  extended. Started on NRB with slow improvement to 80's. RN went to grab small suction catheter to suction oral cavity/pharynx. Suspect possible residual blood from NGT attempts, because sats abruptly increased after a brief coughing episode. CXR reviewed and shows worsening opacities in bilateral lung bases (atelectasis vs. PNA).     Again called to bedside for wide-QRS tachycardia. No change in neuro status or other vital signs. Read as RBBB with LAFB. Electrolytes without significant derangements. No ECHO ordered yet, placed STAT order to evaluate systolic and diastolic function. Discussed with NCC attending. In light of presence of R ventricular pacemaker, will CTM closely while asymptomatic.     Plan:   - Continue ASA per rectum daily  - Defer Plavix for now, confirm with Vascular Neuro  - GI consult for enteral access issues  - Serial CXR QD and PRN; PRN ABG  - Telemetry monitoring, PRN EKG  - Avoid QTc-prolonging agents  - Pulmonary toilet, titrate O2 supplementation       Due to a high probability of clinically significant, life-threatening deterioration, the patient required my highest level of preparedness to intervene emergently. I personally provided 65 minutes of critical care time (CCT) in directly managing the patient. CCT included obtaining a thorough history, physically examining the patient, continuous monitoring of vitals and pulse oximetry, ordering and review of lab and imaging studies, coordinating urgent treatment and development of a management plan, evaluation of patient's response to treatment, frequent reassessment for potential decompensation, and consultation with other providers. CCT was performed to assess and manage the high probability of imminent, life-threatening deterioration that could result in multi-organ failure. CTT provided was exclusive of separately billable procedures and treatment of other patients.     Michaelle Lilly PA-C  Neurocritical Care  1/28/2024

## 2024-01-28 NOTE — HOSPITAL COURSE
01/28/2024 Continues to have significant L MCA deficits s/p thrombectomy. Overnight had episode of tachycardia in 130s, EKG with wide complex QRS likely ST. Improved with metoprolol. Continues to have intermittent episode of tachycardia per day RN. Unable to get PO access overnight due to tube coiling in stomach. Currently on ASA [place] only, will start plavix for DAPT once PO route established. Repeat CTH, echo, therapy evals pending.  01/29/2024 Continue to have L MCA syndrome s/p thrombectomy. Tachycardia with wide complex QRS continue to be an issue. Pacer in place. Awaiting echo  NGT in place; DAPT started. SLP pending.   01/30/2024 Neuro exam stable. Continue to be on cardene drip. NGT in place. SLP recommend NPO.   1-31-24 had few runs of wide QRS tachy overnight and was given IV Lopressor, no change in neuro status

## 2024-01-28 NOTE — ASSESSMENT & PLAN NOTE
Pierre Jama is a 89 y.o. male with PMH of HTN, HLD, Afib (on xarelto) that was [place] [facility] [place] L MCA syndrome for intervention of L M1 occlusion. Patient admitted to OSH 1/25 for PNA, hypoxic respiratory failure, and sepsis. On 1/27 found to have new RSW, aphasia, L gaze. LKW midnight 1/27, OOW for TNK. Telestroke NIHSS 26. CTA with L M1 occlusion, CTH with good ASPECTS. On arrival to [facility] [place], neuro exam stable with NIHSS 24. Patient taken immediately to IR, now s/p L M1 thrombectomy with TICI 3 reperfusion as well as L ICA angioplasty and stenting.     Continues to have significant L MCA deficits s/p thrombectomy. Overnight had episode of tachycardia in 130s, EKG with wide complex QRS likely ST. Improved with metoprolol. Continues to have intermittent episode of tachycardia per day RN. Unable to get PO access overnight due to tube coiling in stomach. Currently on ASA [place] only, will start plavix for DAPT once PO route established. Repeat CTH, echo, therapy evals pending.      Antithrombotics for secondary stroke prevention: Antiplatelets: Aspirin: 81 mg daily  Clopidogrel: 75 mg daily for DAPT due to stent once PO route established; currently on ASA 325mg [place] only     Statins for secondary stroke prevention and hyperlipidemia, if present: Statins: Atorvastatin- 40 mg daily    Aggressive risk factor modification: HTN, HLD, Diet, Exercise, Obesity, A-Fib     Rehab efforts: The patient has been evaluated by a stroke team provider and the therapy needs have been fully considered based off the presenting complaints and exam findings. The following therapy evaluations are needed: PT evaluate and treat, OT evaluate and treat, SLP evaluate and treat    Diagnostics ordered/pending: CT scan of head without contrast to asses brain parenchyma, MRI head without contrast to assess brain parenchyma, TTE to assess cardiac function/status     VTE prophylaxis: Heparin 5000 units SQ every 8 hours  Mechanical prophylaxis:  Place SCDs    BP parameters: Infarct: Post sucessful thrombectomy, SBP <140

## 2024-01-28 NOTE — PLAN OF CARE
"[facility] Care Plan    POC reviewed with Pierre Jama and family at 1700. Pt's wife and daughter verbalized understanding. Questions and concerns addressed. No acute events today. Pt progressing toward goals. Will continue to monitor. See below and flowsheets for full assessment and VS info.             Is this a stroke patient? yes- Stroke booklet reviewed with patient and family, risk factors identified for patient and stroke booklet remains at bedside for ongoing education.     Neuro:  Crys Coma Scale  Best Eye Response: 3-->(E3) to speech  Best Motor Response: 6-->(M6) obeys commands  Best Verbal Response: 1-->(V1) none  Crys Coma Scale Score: 10  Pupil PERRLA: yes     24 hr Temp:  [97.4 °F (36.3 °C)-97.5 °F (36.4 °C)]     CV:   Rhythm: normal sinus rhythm  BP goals:   SBP < 140  MAP > 65    Resp:           Plan:  wean from nasal cannula    GI/:     Diet/Nutrition Received: NPO  Last Bowel Movement: 01/26/24  Voiding Characteristics: external catheter    Intake/Output Summary (Last 24 hours) at 1/27/2024 2015  Last data filed at 1/27/2024 1835  Gross per 24 hour   Intake 900 ml   Output 200 ml   Net 700 ml     Unmeasured Output  Stool Occurrence: 0    Labs/Accuchecks:  No results for input(s): "WBC", "RBC", "HGB", "HCT", "PLT" in the last 168 hours. No results for input(s): "NA", "K", "CO2", "CL", "BUN", "CREATININE", "ALKPHOS", "ALT", "AST", "BILITOT" in the last 168 hours.    Invalid input(s): "[id number]" No results for input(s): "PROTIME", "INR", "APTT", "HEPANTIXA" in the last 168 hours. No results for input(s): "CPK", "CPKMB", "TROPONINI", "MB" in the last 168 hours.    Electrolytes: N/A - electrolytes WDL  Accuchecks: q6H    Gtts:      LDA/Wounds:    Nurses Note -- 4 Eyes      1/27/2024   8:15 PM      Skin assessed during: Admit      [] No Altered Skin Integrity Present    []Prevention Measures Documented      [] Yes- Altered Skin Integrity Present or Discovered   [] LDA Added if Not in Epic (Describe " Wound)   [] New Altered Skin Integrity was Present on Admit and Documented in LDA   [] Wound Image Taken    Wound Care Consulted? No    Attending Nurse:  Petra Gaytan RN/Staff Member:  FABRIZIO Mera; FABRIZIO Velasquez      [facility]

## 2024-01-28 NOTE — NURSING
Patient arrived to [facility] from IR  by bed.    Report received from:  FABRIZIO Feldman    Type of stroke/diagnosis:  Embolic stroke of L MCA      Thrombectomy end time: 1605    Current symptoms: Pt opens eyes to speech, does not follow command, nonverbal, LUE spontaneous, RUE/BLL withdraws.    Skin Assessment done: Yes  Wounds noted: No    *If wounds noted, was Wound Care consulted? No    *If wounds noted, LDA placed? N/A  Skin Assessment Verified by:  FABRIZIO Mera and FABRIZIO Velasquez Completed? pending    Patient Belongings on Admit: a pair of worn white socks    [facility] notified: Deborah Mclean NP.

## 2024-01-28 NOTE — SUBJECTIVE & OBJECTIVE
Neurologic Chief Complaint: L MCA syndrome + L M1 occlusion    Subjective:     Interval History: Patient is seen for follow-up neurological assessment and treatment recommendations: +  Continues to have significant L MCA deficits s/p thrombectomy. Overnight had episode of tachycardia in 130s, EKG with wide complex QRS likely ST. Improved with metoprolol. Continues to have intermittent episode of tachycardia per day RN. Unable to get PO access overnight due to tube coiling in stomach. Currently on ASA [place] only, will start plavix for DAPT once PO route established. Repeat CTH, echo, therapy evals pending.    HPI, Past Medical, Family, and Social History remains the same as documented in the initial encounter.     Review of Systems   Unable to perform ROS: Other (severe aphasia)     Scheduled Meds:   aspirin  325 mg Per NG tube Daily    atorvastatin  40 mg Per NG tube Daily    clopidogreL  150 mg Per NG tube Once    metoprolol  5 mg Intravenous Once    mupirocin   Nasal BID     Continuous Infusions:  PRN Meds:acetaminophen, bisacodyL, hydrALAZINE, labetalol, magnesium oxide, magnesium oxide, ondansetron, oxymetazoline, potassium bicarbonate, potassium bicarbonate, potassium bicarbonate, potassium, sodium phosphates, potassium, sodium phosphates, potassium, sodium phosphates, sodium chloride 0.9%, sodium chloride 0.9%    Objective:     Vital Signs (Most Recent):  Temp: 99.6 °F (37.6 °C) (01/28/24 1105)  Pulse: (!) 131 (01/28/24 1105)  Resp: 18 (01/28/24 1105)  BP: 129/73 (01/28/24 1105)  SpO2: 98 % (01/28/24 1105)  BP Location: Left arm    Vital Signs Range (Last 24H):  Temp:  [97.4 °F (36.3 °C)-100 °F (37.8 °C)]   Pulse:  []   Resp:  [12-42]   BP: ()/(56-90)   SpO2:  [94 %-99 %]   BP Location: Left arm       Physical Exam  Vitals and nursing note reviewed.   Constitutional:       Appearance: He is not toxic-appearing.   HENT:      Head: Normocephalic.      Nose: Nose normal.      Mouth/Throat:      Mouth:  Mucous membranes are moist.   Eyes:      General: No scleral icterus.     Conjunctiva/sclera: Conjunctivae normal.   Cardiovascular:      Rate and Rhythm: Normal rate.   Pulmonary:      Effort: Pulmonary effort is normal. No respiratory distress.   Abdominal:      General: There is no distension.   Musculoskeletal:         General: No deformity or signs of injury.      Cervical back: Normal range of motion.   Skin:     General: Skin is warm.      Findings: No bruising.   Neurological:      Mental Status: He is lethargic.      Cranial Nerves: Dysarthria and facial asymmetry present.      Motor: Weakness present.   Psychiatric:         Speech: He is noncommunicative (severe aphasia).         Behavior: Behavior is cooperative.              Neurological Exam:   LOC: drowsy  Attention Span: poor  Language: Global aphasia  Articulation: Untestable due to severe aphasia   Orientation: Untestable due to severe aphasia   Visual Fields: Full  EOM (CN III, IV, VI): Gaze preference  left  Facial Movement (CN VII): Lower facial weakness on the Right  Motor: Arm left  Paresis: 4/5  Leg left  Paresis: 3/5  Arm right  Plegia 0/5  Leg right Plegia 0/5  Sensation: Anatoliy-hypoesthesia right    Laboratory:  CMP:   Recent Labs   Lab 01/28/24  0330   CALCIUM 9.3   ALBUMIN 3.5   PROT 7.0      K 4.4   CO2 22*      BUN 31*   CREATININE 1.7*   ALKPHOS 95   ALT 12   AST 14   BILITOT 0.6     CBC:   Recent Labs   Lab 01/28/24 0330   WBC 5.83   RBC 4.22*   HGB 12.4*   HCT 37.8*      MCV 90   MCH 29.4   MCHC 32.8     Lipid Panel:   Recent Labs   Lab 01/27/24 2011   CHOL 220*   LDLCALC 155.6   HDL 39*   TRIG 127     Coagulation:   Recent Labs   Lab 01/27/24 2011   INR 1.1   APTT 26.9     Hgb A1C:   Recent Labs   Lab 01/28/24 0330   HGBA1C 8.2*     TSH:   Recent Labs   Lab 01/27/24 2011   TSH 1.875       Diagnostic Results     Brain Imaging:  [facility] 1/27/2024 @ OSH  Radiology report not available. No acute ischemia with favorable  ASPECTS.  Hyperdense sign in the left distal M1.         Vessel Imaging:  IR cerebral angiogram 1/27/2024  Preliminary interpretation: > 75% stenosis of the L ICA, treated to 0% w angioplasty and stenting; L MCA occlusion treated to TICI 3 w aspiration.  Please see Imaging report for full details.     CTA head/neck 1/27/2024 @ OSH  Impression:  1. There is acute clot in the left distal M1 and proximal superior branch M2 MCA with high-grade stenosis/LVO.  Moderate referral collaterals  2. Chronic LVO of the distal right vertebral artery.  High-grade stenosis of the proximal left vertebral artery greater than 70%  3. High-grade stenosis right carotid bifurcation 75%.  Moderate grade stenosis on the left 60%      Cardiac Imaging   TTE pending

## 2024-01-28 NOTE — H&P
[facility]  Neurocritical Care  History & Physical    Admit Date: 1/27/2024  Service Date: 01/27/2024  Length of Stay: 0    Subjective:     Chief Complaint: Embolic stroke involving left middle cerebral artery    History of Present Illness: Patient is an 89-year-old male with history of atrial fibrillation on anticoagulation, hypertension who presents a transfer from an outside hospital for an acute stroke needing thrombectomy.  Last known normal at 1159 pm.  He initially presented with right-sided weakness, right-sided facial droop and aphasia.  Imaging showed a large vessel occlusion so patient was transferred here for thrombectomy.  No TNK outside of window. Patient s/p thrombectomy TiCi3 with stents in left ICA. Admit to [facility] for neuro monitoring and higher level of care.       No past medical history on file.  No past surgical history on file.   No current facility-administered medications on file prior to encounter.     No current outpatient medications on file prior to encounter.      Allergies: Patient has no allergy information on record.  No family history on file.     Review of Systems   Reason unable to perform ROS: decrease LOC.     Objective:     Vitals:    Pulse: 81  BP: 114/66  MAP (mmHg): 82  Resp: (!) 21  SpO2: 95 %    Temp  Min: 97.4 °F (36.3 °C)  Max: 97.4 °F (36.3 °C)  Pulse  Min: 81  Max: 87  BP  Min: 114/66  Max: 176/98  MAP (mmHg)  Min: 82  Max: 82  Resp  Min: 16  Max: 21  SpO2  Min: 95 %  Max: 98 %    No intake/output data recorded.            Physical Exam  Vitals and nursing note reviewed.   Constitutional:       Appearance: He is ill-appearing.   HENT:      Head: Normocephalic.      Nose: Nose normal.      Mouth/Throat:      Mouth: Mucous membranes are moist.      Pharynx: Oropharynx is clear.   Eyes:      Pupils: Pupils are equal, round, and reactive to light.   Cardiovascular:      Rate and Rhythm: Normal rate and regular rhythm.      Pulses: Normal pulses.      Heart  sounds: Normal heart sounds.   Pulmonary:      Effort: Pulmonary effort is normal.      Breath sounds: Normal breath sounds.   Abdominal:      General: Bowel sounds are normal.      Palpations: Abdomen is soft.   Musculoskeletal:         General: Normal range of motion.   Skin:     General: Skin is warm and dry.      Capillary Refill: Capillary refill takes 2 to 3 seconds.   Neurological:      Comments: GCS 10 no sedation  Global asphasia  PERRL  RUE WD  LUE spontanepous  RLE triple flexion  LLE WD  NIH 22 post thrombectomy         Unable to test orientation, language, memory, judgment, insight, fund of knowledge, hearing, shoulder shrug, tongue protrusion, coordination, gait due to level of consciousness.       Today I personally reviewed pertinent medications, lines/drains/airways, imaging, cardiology results, laboratory results, microbiology results, notably:        Assessment/Plan:     Neuro  * Embolic stroke involving left middle cerebral artery    Antithrombotics for secondary stroke prevention: Antiplatelets: Aspirin: 81 mg daily  Aspirin: 325 mg daily  Clopidogrel: 300 mg loading dose x 1, now  Clopidogrel: 75 mg daily    Statins for secondary stroke prevention and hyperlipidemia, if present:   Statins: Atorvastatin- 40 mg daily    Aggressive risk factor modification: A-Fib     Rehab efforts: The patient has been evaluated by a stroke team provider and the therapy needs have been fully considered based off the presenting complaints and exam findings. The following therapy evaluations are needed: PT evaluate and treat, OT evaluate and treat, SLP evaluate and treat, PM&R evaluate for appropriate placement    Diagnostics ordered/pending: MRI head without contrast to assess brain parenchyma    VTE prophylaxis: Heparin 5000 units SQ every 8 hours    BP parameters: Infarct: Post sucessful thrombectomy, SBP <140        Right hemiplegia  PT/OT    Aphasia  - SLP    Cardiac/Vascular  HLD (hyperlipidemia)  -  statin    HTN (hypertension)  SBP< 140    A-fib  Hx of    Other  Debility  -PT/OT          The patient is being Prophylaxed for:  Venous Thromboembolism with: Mechanical or Chemical  Stress Ulcer with: Not Applicable   Ventilator Pneumonia with: not applicable    Activity Orders            Progressive Mobility Protocol (mobilize patient to their highest level of functioning at least twice daily) starting at 01/27 2000    Turn patient starting at 01/27 1600    Elevate HOB starting at 01/27 1540    Diet NPO: NPO starting at 01/27 1540          Full Code  Critical care time 40 mins  Deborah Mclean NP  Neurocritical Care  [facility]

## 2024-01-28 NOTE — PLAN OF CARE
[facility] Care Plan    POC reviewed with Pierre Jama and family at 0300. Pt unable to  verbalize understanding d/t aphasia. Questions and concerns addressed. No acute events overnight. Pt progressing toward goals. Will continue to monitor. See below and flowsheets for full assessment and VS info.   - post thrombectomy monitoring maintained R groin site CDI no issues   - prn labetalol given x1 for sbp less than 140  - @ 0600 pt went into wide complex tachycardia HR sustaining in the 130's STAT 12 lead obtained HERLINDA Lilly notified and came to bedside >> BP stable, STAT echo ordered   - attempted multiple times to place NGT as well as tara tube >> on obtaining KUB all nasogastric tubes noted to be coiled >> pt bilateral nostrils with bloody secretions / making it more difficult to remove and re advance enteral tubes pt not tolerating procedure well >> ultimately removed tara d/t being coiled   - unable to given scheduled plavix   - aspirin suppository given           Is this a stroke patient? yes- Stroke booklet reviewed with patient and family, risk factors identified for patient and stroke booklet remains at bedside for ongoing education.     Neuro:  [place] Coma Scale  Best Eye Response: 3-->(E3) to speech  Best Motor Response: 5-->(M5) localizes pain  Best Verbal Response: 1-->(V1) none  [place] Coma Scale Score: 9  Pupil PERRLA: yes     24hr Temp:  [97.4 °F (36.3 °C)-100 °F (37.8 °C)]     CV:   Rhythm: paced rhythm  BP goals:   SBP < 140  MAP > 65    Resp:           Plan: N/A    GI/:     Diet/Nutrition Received: NPO  Last Bowel Movement: 01/26/24  Voiding Characteristics: external catheter    Intake/Output Summary (Last 24 hours) at 1/28/2024 0743  Last data filed at 1/28/2024 0001  Gross per 24 hour   Intake 900 ml   Output 585 ml   Net 315 ml     Unmeasured Output  Urine Occurrence: 1  Stool Occurrence: 0  Pad Count: 3    Labs/Accuchecks:  Recent Labs   Lab 01/28/24  0330   WBC 5.83   RBC 4.22*   HGB 12.4*   HCT  "37.8*         Recent Labs   Lab 01/28/24  0330      K 4.4   CO2 22*      BUN 31*   CREATININE 1.7*   ALKPHOS 95   ALT 12   AST 14   BILITOT 0.6      Recent Labs   Lab 01/27/24 2011   INR 1.1   APTT 26.9    No results for input(s): "CPK", "CPKMB", "TROPONINI", "MB" in the last 168 hours.    Electrolytes: N/A - electrolytes WDL  Accuchecks: Q6H    Gtts:      LDA/Wounds:    Nurses Note -- 4 Eyes      1/28/2024   7:43 AM      Skin assessed during: Daily Assessment      [] No Altered Skin Integrity Present    []Prevention Measures Documented      [] Yes- Altered Skin Integrity Present or Discovered   [] LDA Added if Not in Epic (Describe Wound)   [] New Altered Skin Integrity was Present on Admit and Documented in LDA   [] Wound Image Taken    Wound Care Consulted? No    Attending Nurse:  FABRIZIO Lambert     Second RN/Staff Member:  Esteban DINH   "

## 2024-01-28 NOTE — CONSULTS
[facility]  Vascular Neurology  Comprehensive Stroke Center  Consult Note    Consults  Assessment/Plan:     Patient is a 89 y.o. year old male with:    * Embolic stroke involving left middle cerebral artery  Pierre Jama is a 89 y.o. male with PMH of HTN, HLD, Afib (on xarelto) that was transferred to [facility] with acute L MCA syndrome for intervention of L M1 occlusion. Patient admitted to OSH 1/25 for PNA, hypoxic respiratory failure, and sepsis. On 1/27 found to have new RSW, aphasia, L gaze. LKW midnight 1/27, OOW for TNK. Telestroke NIHSS 26. CTA with L M1 occlusion, CTH with good ASPECTS. On arrival to [facility], neuro exam stable with NIHSS 24. Patient taken immediately to IR, now s/p L M1 thrombectomy with TICI 3 reperfusion as well as L ICA angioplasty and stenting.     Patient will be admitted to [facility] for close monitoring and observation.      Antithrombotics for secondary stroke prevention: Antiplatelets: Aspirin: 81 mg daily  Clopidogrel: 75 mg daily due to angioplasty and stent placement; will need to resume AC for afib, timing TBD    Statins for secondary stroke prevention and hyperlipidemia, if present:   Statins: Atorvastatin- 40 mg daily    Aggressive risk factor modification: HTN, HLD, Diet, Exercise, Obesity, A-Fib     Rehab efforts: The patient has been evaluated by a stroke team provider and the therapy needs have been fully considered based off the presenting complaints and exam findings. The following therapy evaluations are needed: PT evaluate and treat, OT evaluate and treat, SLP evaluate and treat    Diagnostics ordered/pending: HgbA1C to assess blood glucose levels, Lipid Profile to assess cholesterol levels, MRI head without contrast to assess brain parenchyma, TTE to assess cardiac function/status , TSH to assess thyroid function    VTE prophylaxis: Heparin 5000 units SQ every 8 hours  Mechanical prophylaxis: Place SCDs    BP parameters: Infarct: Post sucessful thrombectomy,  SBP <140        Neurologic deficit due to acute ischemic stroke  Aphasia   Right hemiplegia   -Due to stroke  -Aggressive therapy  -PT/OT/SLP eval and treat    HLD (hyperlipidemia)  -Stroke risk factor  -LDL pending  -Atorvastatin 40mg daily for LDL goal <70    HTN (hypertension)  -Stroke risk factor  -SBP <140 s/p thrombectomy, maintain MAP >65  -Cardene gtt per NCC  -PRN labetalol/hydralazine    A-fib  -Stroke risk factor  -On xarelto, reportedly stopped at OSH due to c/f anemia  -Will need to further discuss timing of when to resume AC for afib        STROKE DOCUMENTATION     Acute Stroke Times   Last Known Normal Date: 01/27/24  Last Known Normal Time: 0000  Stroke Team Called Date: 01/27/24  Stroke Team Called Time: 1511  Stroke Team Arrival Date: 01/27/24  Stroke Team Arrival Time: 1511  CT Interpretation Time: 1210 (OSH imaging reviewed PTA)  Thrombolytic Therapy Recommended: No  CTA Interpretation Time: 1224 (OSH imaging reviewed PTA)  Thrombectomy Recommended: Yes  Decision to Treat Time for IR: 1511    NIH Scale:  1a. Level of Consciousness: 0-->Alert, keenly responsive  1b. LOC Questions: 2-->Answers neither question correctly  1c. LOC Commands: 1-->Performs one task correctly  2. Best Gaze: 1-->Partial gaze palsy, gaze is abnormal in one or both eyes, but forced deviation or total gaze paresis is not present  3. Visual: 2-->Complete hemianopia  4. Facial Palsy: 2-->Partial paralysis (total or near-total paralysis of lower face)  5a. Motor Arm, Left: 0-->No drift, limb holds 90 (or 45) degrees for full 10 secs  5b. Motor Arm, Right: 4-->No movement  6a. Motor Leg, Left: 3-->No effort against gravity, leg falls to bed immediately  6b. Motor Leg, Right: 3-->No effort against gravity, leg falls to bed immediately  7. Limb Ataxia: 0-->Absent  8. Sensory: 1-->Mild-to-moderate sensory loss, patient feels pinprick is less sharp or is dull on the affected side, or there is a loss of superficial pain with  pinprick, but patient is aware of being touched  9. Best Language: 3-->Mute, global aphasia, no usable speech or auditory comprehension  10. Dysarthria: 2-->Severe dysarthria, patients speech is so slurred as to be unintelligible in the absence of or out of proportion to any dysphasia, or is mute/anarthric  11. Extinction and Inattention (formerly Neglect): 0-->No abnormality  Total (NIH Stroke Scale): 24    Modified Hendricks Score: 0  [place] Coma Scale:    ABCD2 Score:    [id number] Score:5  HAS -BLED Score:   ICH Score:   Hunt & Chandler Classification:       Thrombolysis Candidate? No, Out of window - Symptom onset > 4.5 hours    Delays to Thrombolysis?  Not Applicable    Interventional Revascularization Candidate?   Is the patient eligible for mechanical endovascular reperfusion (RADHA)?  Yes    Delays to Thrombectomy? Not Applicable    Hemorrhagic change of an Ischemic Stroke: Does this patient have an ischemic stroke with hemorrhagic changes? No     Subjective:     History of Present Illness:  Pierre Jama is a 89 y.o. male with PMH of HTN, HLD, Afib (on xarelto) that presents as a transfer from [facility] for higher level care of acute L MCA syndrome for possible intervention of L M1 occlusion. History obtained from review of documents from OSH. He was initially admitted to OSH 1/25 for PNA, hypoxic respiratory failure, and sepsis after presenting with worsening SOB/BEARD. On morning of 1/27 patient noted to have neuro change with new RSW, aphasia, and L gaze. LKN midnight 1/27. Telestroke NIHSS 26, VAN positive. CTH/CTA at OSH with L M1 occlusion, no acute hemorrhage, favorable ASPECTS. Not candidate for TNK due to OOW. On arrival to [facility], neuro exam stable and continues to have L MCA syndrome. NIHSS 24. Patient taken immediately to IR for emergent DSA, now s/p L M1 thrombectomy with TICI 3 reperfusion as well as L ICA angioplasty and stenting. Patient will be admitted to [facility] for close monitoring and  observation.          Past Medical History:   Diagnosis Date    A-fib 01/27/2024    Diabetes mellitus     GERD (gastroesophageal reflux disease)     HLD (hyperlipidemia) 01/27/2024    HTN (hypertension) 01/27/2024     Past Surgical History:   Procedure Laterality Date    REPLACEMENT OF DUAL CHAMBER PACEMAKER GENERATOR  02/25/2020     Social History     Tobacco Use    Smoking status: Former     Types: Cigarettes   Substance Use Topics    Drug use: Never     Review of patient's allergies indicates:  Not on File    Medications: I have reviewed the current medication administration record.    No medications prior to admission.       Review of Systems   Unable to perform ROS: Other (severe aphasia)     Objective:     Vital Signs (Most Recent):  Temp: 97.5 °F (36.4 °C) (01/27/24 1650)  Pulse: 81 (01/27/24 1650)  Resp: (!) 21 (01/27/24 1650)  BP: 114/66 (01/27/24 1650)  SpO2: 95 % (01/27/24 1650)    Vital Signs Range (Last 24H):  Temp:  [97.4 °F (36.3 °C)-97.5 °F (36.4 °C)]   Pulse:  [81-87]   Resp:  [16-21]   BP: (114-176)/(66-98)   SpO2:  [95 %-98 %]        Physical Exam  Vitals and nursing note reviewed.   Constitutional:       Appearance: He is not toxic-appearing.   HENT:      Head: Normocephalic.      Nose: Nose normal.      Mouth/Throat:      Mouth: Mucous membranes are moist.   Eyes:      General: No scleral icterus.     Conjunctiva/sclera: Conjunctivae normal.   Cardiovascular:      Rate and Rhythm: Normal rate.   Pulmonary:      Effort: Pulmonary effort is normal. No respiratory distress.   Abdominal:      General: There is no distension.   Musculoskeletal:         General: No deformity or signs of injury.      Cervical back: Normal range of motion.   Skin:     General: Skin is warm.      Findings: No bruising.   Neurological:      Mental Status: He is alert.      Cranial Nerves: Dysarthria and facial asymmetry present.      Motor: Weakness present.   Psychiatric:         Speech: He is noncommunicative (severe  "aphasia).         Behavior: Behavior is cooperative.              Neurological Exam:   LOC: alert  Attention Span: poor  Language: Global aphasia  Articulation: Untestable due to severe aphasia   Orientation: Untestable due to severe aphasia   Visual Fields: Hemianopsia right  EOM (CN III, IV, VI): Gaze preference  left  Facial Movement (CN VII): Lower facial weakness on the Right  Motor: Arm left  Normal 5/5  Leg left  Paresis: 2/5  Arm right  Plegia 0/5  Leg right Paresis: 2/5  Sensation: Anatoliy-hypoesthesia right      Laboratory:  CMP: No results for input(s): "GLUCOSE", "CALCIUM", "ALBUMIN", "PROT", "NA", "K", "CO2", "CL", "BUN", "CREATININE", "ALKPHOS", "ALT", "AST", "BILITOT" in the last 24 hours.  CBC: No results for input(s): "WBC", "RBC", "HGB", "HCT", "PLT", "MCV", "MCH", "MCHC" in the last 168 hours.  Lipid Panel: No results for input(s): "CHOL", "LDLCALC", "HDL", "TRIG" in the last 168 hours.  Coagulation: No results for input(s): "PT", "INR", "APTT" in the last 168 hours.  Hgb A1C: No results for input(s): "HGBA1C" in the last 168 hours.  TSH: No results for input(s): "TSH" in the last 168 hours.    Diagnostic Results:    Brain Imaging:  [facility] 1/27/2024 @ OSH  Radiology report not available. No acute ischemia with favorable ASPECTS.  Hyperdense sign in the left distal M1.       Vessel Imaging:  IR cerebral angiogram 1/27/2024  Preliminary interpretation: > 75% stenosis of the L ICA, treated to 0% w angioplasty and stenting; L MCA occlusion treated to TICI 3 w aspiration.  Please see Imaging report for full details.    CTA head/neck 1/27/2024 @ OSH  Impression:  1. There is acute clot in the left distal M1 and proximal superior branch M2 MCA with high-grade stenosis/LVO.  Moderate referral collaterals  2. Chronic LVO of the distal right vertebral artery.  High-grade stenosis of the proximal left vertebral artery greater than 70%  3. High-grade stenosis right carotid bifurcation 75%.  Moderate grade stenosis " on the left 60%      Radha Britton PA-C  Comprehensive Stroke Center  Department of Vascular Neurology   [facility]

## 2024-01-29 PROBLEM — E11.9 DIABETES MELLITUS: Status: ACTIVE | Noted: 2024-01-01

## 2024-01-29 NOTE — CONSULTS
[facility]  Gastroenterology  Consult Note    Patient Name: Pierre Jama  MRN: [id number]  Admission Date: 1/27/2024  Hospital Length of Stay: 2 days  Code Status: DNR   Attending Provider: Gómez Bernard MD   Consulting Provider: King Baker MD  Primary Care Physician: No primary care provider on file.  Principal Problem:Embolic stroke involving left middle cerebral artery    Inpatient consult to Gastroenterology  Consult performed by: King Baker MD  Consult ordered by: Leisa Palencia NP        Subjective:     HPI: Pierre Jama is a 89 y.o. male with history of Afib (on xarelto), HTN, HLD who was initially admitted on 1/25 for PNA and sepsis. On 1/27, had acute neuro changes concerning for acute CVA. Imaging consistent with L MCA stroke. Taken to IR for L M1 thrombectomy and ICA angioplasty and stenting. Remains in neuro ICU. Still having episodic tachycardia. Nursing unable to place NG despite multiple times.        Objective:     Vitals:    01/29/24 0700   BP: (!) 148/69   Pulse: 79   Resp: 20   Temp:          Constitutional:  NAD  HENT: Head: Normal, normocephalic  Eyes: conjunctiva clear and sclera nonicteric  GI: soft, non-tender, without masses or organomegaly  Skin: normal color  Neurological: awake, non-interactive        Significant Labs:  Reviewed the following pertinent laboratory tests: Hgb 12  Plts 196 INR 1.0.    Significant Imaging:  No pertinent imaging.      Assessment/Plan:     Pierre Jama is a 89 y.o. male admitted with acute MCA stroke who has failed NG placement multiple times. GI consulted for assistance. Endoscopic placement today 1/29.    Problem List:  Failed NG placement  Acute CVA    Recommendations:  - EGD for endoscopic NG placement today 1/29    Thank you for involving us in the care of Pierre Jama. Please call with any additional questions, concerns or changes in the patient's clinical status. We will continue to follow.     King Baker  MD  Gastroenterology Fellow PGY V  [facility]

## 2024-01-29 NOTE — PLAN OF CARE
"[facility] Care Plan    POC reviewed with Pierre Jama and family at 0300. Pt verbalized understanding. Questions and concerns addressed. No acute events overnight. Pt progressing toward goals. Will continue to monitor. See below and flowsheets for full assessment and VS info.     -CTH completed   -HR and rhythm fluctuating throughout night. Wide complex QRS with tachycardia (120-130s), then returns to baseline NSR (60s-80s) with or without ventricular pacing.  Echo pending this AM.   -GI consult pending for enteral access        Is this a stroke patient? yes- Stroke booklet reviewed with patient and family, risk factors identified for patient and stroke booklet remains at bedside for ongoing education.     Neuro:  [place] Coma Scale  Best Eye Response: 3-->(E3) to speech  Best Motor Response: 5-->(M5) localizes pain  Best Verbal Response: 1-->(V1) none  [place] Coma Scale Score: 9  Pupil PERRLA: yes     24hr Temp:  [98.7 °F (37.1 °C)-99.6 °F (37.6 °C)]     CV:   Rhythm: normal sinus rhythm  BP goals:   SBP < 140  MAP > 65    Resp:           Plan: N/A    GI/:     Diet/Nutrition Received: NPO  Last Bowel Movement: 01/26/24  Voiding Characteristics: external catheter    Intake/Output Summary (Last 24 hours) at 1/29/2024 0556  Last data filed at 1/29/2024 0502  Gross per 24 hour   Intake 579.03 ml   Output 710 ml   Net -130.97 ml     Unmeasured Output  Urine Occurrence: 1  Stool Occurrence: 0  Pad Count: 2    Labs/Accuchecks:  Recent Labs   Lab 01/29/24  0014   WBC 8.32   RBC 4.04*   HGB 12.0*   HCT 36.9*         Recent Labs   Lab 01/29/24  0014   *   K 4.3   CO2 22*      BUN 35*   CREATININE 1.4   ALKPHOS 87   ALT 14   AST 16   BILITOT 0.7      Recent Labs   Lab 01/29/24  0014   INR 1.0   APTT 24.8    No results for input(s): "CPK", "CPKMB", "TROPONINI", "MB" in the last 168 hours.    Electrolytes: N/A - electrolytes WDL  Accuchecks: Q6H    Gtts:      LDA/Wounds:    Nurses Note -- 4 " Eyes      1/29/2024   5:56 AM      Skin assessed during: Daily Assessment      [] No Altered Skin Integrity Present    []Prevention Measures Documented      [] Yes- Altered Skin Integrity Present or Discovered   [] LDA Added if Not in Epic (Describe Wound)   [] New Altered Skin Integrity was Present on Admit and Documented in LDA   [] Wound Image Taken    Wound Care Consulted? No    Attending Nurse:  Farrah Gaytan RN/Staff Member:  Esteban DINH

## 2024-01-29 NOTE — H&P (VIEW-ONLY)
[facility]  Gastroenterology  Consult Note    Patient Name: Pierre Jama  MRN: [id number]  Admission Date: 1/27/2024  Hospital Length of Stay: 2 days  Code Status: DNR   Attending Provider: Gómez Bernard MD   Consulting Provider: King Baker MD  Primary Care Physician: No primary care provider on file.  Principal Problem:Embolic stroke involving left middle cerebral artery    Inpatient consult to Gastroenterology  Consult performed by: King Baker MD  Consult ordered by: Leisa Palencia NP        Subjective:     HPI: Pierre Jama is a 89 y.o. male with history of Afib (on xarelto), HTN, HLD who was initially admitted on 1/25 for PNA and sepsis. On 1/27, had acute neuro changes concerning for acute CVA. Imaging consistent with L MCA stroke. Taken to IR for L M1 thrombectomy and ICA angioplasty and stenting. Remains in neuro ICU. Still having episodic tachycardia. Nursing unable to place NG despite multiple times.        Objective:     Vitals:    01/29/24 0700   BP: (!) 148/69   Pulse: 79   Resp: 20   Temp:          Constitutional:  NAD  HENT: Head: Normal, normocephalic  Eyes: conjunctiva clear and sclera nonicteric  GI: soft, non-tender, without masses or organomegaly  Skin: normal color  Neurological: awake, non-interactive        Significant Labs:  Reviewed the following pertinent laboratory tests: Hgb 12  Plts 196 INR 1.0.    Significant Imaging:  No pertinent imaging.      Assessment/Plan:     Pierre Jama is a 89 y.o. male admitted with acute MCA stroke who has failed NG placement multiple times. GI consulted for assistance. Endoscopic placement today 1/29.    Problem List:  Failed NG placement  Acute CVA    Recommendations:  - EGD for endoscopic NG placement today 1/29    Thank you for involving us in the care of Pierre Jama. Please call with any additional questions, concerns or changes in the patient's clinical status. We will continue to follow.     King Baker  MD  Gastroenterology Fellow PGY V  [facility]

## 2024-01-29 NOTE — ANESTHESIA POSTPROCEDURE EVALUATION
Anesthesia Post Evaluation    Patient: Pierre Jama    Procedure(s) Performed: Procedure(s) (LRB):  EGD (ESOPHAGOGASTRODUODENOSCOPY) (N/A)    Final Anesthesia Type: general      Patient location during evaluation: ICU  Patient participation: No - Unable to Participate, Sedation  Level of consciousness: sedated  Post-procedure vital signs: reviewed and stable  Pain management: adequate  Airway patency: patent    PONV status at discharge: No PONV  Anesthetic complications: no      Cardiovascular status: hemodynamically stable, hypertensive and tachycardic  Respiratory status: spontaneous ventilation  Hydration status: euvolemic  Follow-up not needed.              Vitals Value Taken Time   /99 01/29/24 1208   Temp 98.5 01/29/24 1211   Pulse 133 01/29/24 1210   Resp 20 01/29/24 1210   SpO2 97 % 01/29/24 1210   Vitals shown include unvalidated device data.      No case tracking events are documented in the log.      Pain/Khushbu Score: No data recorded

## 2024-01-29 NOTE — HOSPITAL COURSE
01/28/2024 Unable to place NG/Keotube, ASA load given AR but unable to administer Plavix. Discussed w/ Vascular Neurology, ok for ASA alone for now. GI consulted for assistance w/ enteral access, plan to see patent tomorrow. Metoprolol x 2 , 500cc NS, and 2g mag given for tachycardia. Discussion w/ wife, aravind @ bedside and daughter on phone, code status changed to DNR.  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, pending speech eval  01/30/2024: new temp, ronchi evident, lactate not elevated but devloping SIRS alteration in VS and encephalopathy, pan cultured including NT specimen and started on vanc/zosyn, begin trickle feeds, presently not hypotensive  01/31/2024: no change in neuro status, from my discussion with wife yesterday, it appears that end of life discussions along with determining goals of care in settings of significant disability were not performed, consult palliative, RVR more of a pronlem but not occurring with significant hypotension, dilt iv bolus appears to have worked-will need to be repeated when dilt gtt arrives  02/01: hypoxia and pulmonary secretions have worsened, HR controlled on combo of dilt and metoprolol, due to continued advancement of his pulmonary condition, it is reasonable to not delay initiation of comfort care and begin immediately with Comp[facility]us, transfer canceled to outside facility

## 2024-01-29 NOTE — SUBJECTIVE & OBJECTIVE
Interval History:  See hospital course.    Review of Systems   Unable to perform ROS: Patient nonverbal     Objective:     Vitals:  Temp: 99.3 °F (37.4 °C)  Pulse: 80  Rhythm: normal sinus rhythm  BP: 134/63  MAP (mmHg): 91  Resp: 17  SpO2: 96 %    Temp  Min: 98.7 °F (37.1 °C)  Max: 100 °F (37.8 °C)  Pulse  Min: 70  Max: 136  BP  Min: 91/76  Max: 167/82  MAP (mmHg)  Min: 73  Max: 115  Resp  Min: 10  Max: 42  SpO2  Min: 94 %  Max: 99 %    01/27 0701 - 01/28 0700  In: 900   Out: 585 [Urine:585]   Unmeasured Output  Urine Occurrence: 1  Stool Occurrence: 0  Pad Count: 2        Physical Exam  Vitals and nursing note reviewed.   Eyes:      Pupils: Pupils are equal, round, and reactive to light.   Cardiovascular:      Rate and Rhythm: Regular rhythm. Tachycardia present.      Pulses: Normal pulses.   Pulmonary:      Effort: Pulmonary effort is normal.   Abdominal:      Palpations: Abdomen is soft.   Skin:     General: Skin is warm and dry.      Capillary Refill: Capillary refill takes 2 to 3 seconds.   Neurological:      Mental Status: He is lethargic.      GCS: GCS eye subscore is 3. GCS verbal subscore is 1. GCS motor subscore is 5.      Cranial Nerves: Facial asymmetry present.      Comments:   -E3 V1 M5  -PERRL  -Global aphasia  -L gaze preference  -R facial droop  -Localizes w/ LUE/LLE; moves purposefully/spontaneously  -Wtihdraws w/ RUE/RLE       Unable to test orientation, language, memory, judgment, insight, fund of knowledge, hearing, shoulder shrug, tongue protrusion, coordination, gait due to level of consciousness.       Medications:  Continuous Scheduled[START ON 1/29/2024] aspirin, 300 mg, Daily  atorvastatin, 40 mg, Daily  clopidogreL, 150 mg, Once  mupirocin, , BID    PRNacetaminophen, 650 mg, Q6H PRN  bisacodyL, 10 mg, Daily PRN  dextrose 10%, 12.5 g, PRN  dextrose 10%, 25 g, PRN  glucagon (human recombinant), 1 mg, PRN  hydrALAZINE, 10 mg, Q4H PRN  insulin aspart U-100, 0-5 Units, Q6H PRN  labetalol, 10  mg, Q4H PRN  magnesium oxide, 800 mg, PRN  magnesium oxide, 800 mg, PRN  ondansetron, 4 mg, Q8H PRN  oxymetazoline, 2 spray, BID PRN  potassium bicarbonate, 35 mEq, PRN  potassium bicarbonate, 50 mEq, PRN  potassium bicarbonate, 60 mEq, PRN  potassium, sodium phosphates, 2 packet, PRN  potassium, sodium phosphates, 2 packet, PRN  potassium, sodium phosphates, 2 packet, PRN  sodium chloride 0.9%, 10 mL, PRN  sodium chloride 0.9%, 10 mL, PRN      Today I personally reviewed pertinent medications, lines/drains/airways, imaging, cardiology results, laboratory results, notably: CTH; CBC; CMP    Diet  Diet NPO  Diet NPO

## 2024-01-29 NOTE — SUBJECTIVE & OBJECTIVE
Interval History:      Review of Systems   Unable to perform ROS: Patient nonverbal       Objective:     Vitals:  Temp: 99.3 °F (37.4 °C)  Pulse: 77  Rhythm: paced rhythm  BP: (!) 86/53  MAP (mmHg): 66  Resp: 17  SpO2: (!) 94 %    Temp  Min: 98.6 °F (37 °C)  Max: 100.4 °F (38 °C)  Pulse  Min: 70  Max: 135  BP  Min: 81/51  Max: 198/99  MAP (mmHg)  Min: 61  Max: 142  Resp  Min: 10  Max: 34  SpO2  Min: 94 %  Max: 99 %    01/28 0701 - 01/29 0700  In: 579 [I.V.:79.6]  Out: 710 [Urine:710]   Unmeasured Output  Urine Occurrence: 1  Stool Occurrence: 0  Pad Count: 1        Physical Exam  Eyes:      Pupils: Pupils are equal, round, and reactive to light.      Comments: Left gaze deviation   Cardiovascular:      Rate and Rhythm: Normal rate.   Pulmonary:      Breath sounds: Normal breath sounds.   Abdominal:      Palpations: Abdomen is soft.   Musculoskeletal:         General: No swelling.      Cervical back: Neck supple.   Skin:     General: Skin is warm.   Neurological:      Mental Status: He is alert.      Comments: Dense right hemiplegia              Medications:  Continuoussodium chloride 0.9%, Last Rate: 50 mL/hr at 01/29/24 1500  nicardipine, Last Rate: 5 mg/hr (01/29/24 1500)    Scheduled[START ON 1/30/2024] aspirin, 81 mg, Daily  atorvastatin, 40 mg, Daily  clopidogreL, 75 mg, Daily  heparin (porcine), 5,000 Units, Q8H  metoprolol tartrate, 25 mg, QID  mupirocin, , BID    PRNacetaminophen, 650 mg, Q6H PRN  bisacodyL, 10 mg, Daily PRN  dextrose 10%, 12.5 g, PRN  dextrose 10%, 25 g, PRN  glucagon (human recombinant), 1 mg, PRN  hydrALAZINE, 10 mg, Q4H PRN  insulin aspart U-100, 0-10 Units, Q6H PRN  labetalol, 10 mg, Q4H PRN  magnesium oxide, 800 mg, PRN  magnesium oxide, 800 mg, PRN  ondansetron, 4 mg, Q8H PRN  oxymetazoline, 2 spray, BID PRN  potassium bicarbonate, 35 mEq, PRN  potassium bicarbonate, 50 mEq, PRN  potassium bicarbonate, 60 mEq, PRN  potassium, sodium phosphates, 2 packet, PRN  potassium, sodium  phosphates, 2 packet, PRN  potassium, sodium phosphates, 2 packet, PRN  sodium chloride 0.9%, 10 mL, PRN  sodium chloride 0.9%, 10 mL, PRN      Today I personally reviewed pertinent medications, lines/drains/airways, imaging, cardiology results, laboratory results, notably:    Diet  Diet NPO  Diet NPO

## 2024-01-29 NOTE — ANESTHESIA PREPROCEDURE EVALUATION
[facility]  Anesthesia Pre-Operative Evaluation         Patient Name/: Pierre Jama, 1934  MRN: [id number]    SUBJECTIVE:     Pre-operative evaluation for Procedure(s) (LRB):  EGD (ESOPHAGOGASTRODUODENOSCOPY) (N/A)     2024    Pierre Jama is a 89 y.o. male w/ a significant PMHx of HTN, Afib, dual chamber PPM, and recent embolic CVA s/p thrombectomy in L ICA who presents due to need for enteral access.    Patient now presents for the above procedure(s).    ________________________________________  No results found for this or any previous visit.    ________________________________________    Prev airway: None documented.    LDA:        Peripheral IV - Single Lumen 24 1400 20 G Anterior;Right Forearm (Active)   Site Assessment Clean;Dry;Intact;No redness;No swelling 24   Extremity Assessment Distal to IV No abnormal discoloration;No redness;No swelling;No warmth 24   Line Status Flushed;Saline locked 24   Dressing Status Clean;Dry;Intact 24   Dressing Intervention Integrity maintained 24   Dressing Change Due 24   Site Change Due 24   Reason Not Rotated Not due 24   Number of days: 1            Peripheral IV - Single Lumen 24 2000 20 G Posterior;Right Hand (Active)   Site Assessment Clean;Dry;Intact;No redness;No swelling 24   Extremity Assessment Distal to IV No abnormal discoloration;No redness;No swelling;No warmth 24   Line Status Flushed;Saline locked 24   Dressing Status Clean;Dry;Intact 24   Dressing Intervention Integrity maintained 24   Dressing Change Due 24   Site Change Due 24 07   Reason Not Rotated Not due 24 07   Number of days: 1       Female External Urinary Catheter w/ Suction 24 1701 (Active)   Skin no redness;no breakdown 24 07    Tolerance no signs/symptoms of discomfort 01/29/24 0700   Suction Continuous suction at 60 mmHg 01/29/24 0700   Date of last wick change 01/28/24 01/29/24 0700   Time of last wick change 0700 01/29/24 0700   Output (mL) 100 mL 01/29/24 0502   Number of days: 1       Drips: None documented.      Patient Active Problem List   Diagnosis    Embolic stroke involving left middle cerebral artery    A-fib    HTN (hypertension)    HLD (hyperlipidemia)    Neurologic deficit due to acute ischemic stroke    Aphasia    Right hemiplegia    Debility    Prolonged Q-T interval on ECG    Diabetes mellitus       Review of patient's allergies indicates:  Not on File    Current Inpatient Medications:    aspirin  300 mg Rectal Daily    atorvastatin  40 mg Per NG tube Daily    heparin (porcine)  5,000 Units Subcutaneous Q8H    mupirocin   Nasal BID       No current facility-administered medications on file prior to encounter.     No current outpatient medications on file prior to encounter.       Past Surgical History:   Procedure Laterality Date    REPLACEMENT OF DUAL CHAMBER PACEMAKER GENERATOR  02/25/2020       Social History:  Tobacco Use: Medium Risk (1/27/2024)    Patient History     Smoking Tobacco Use: Former     Smokeless Tobacco Use: Unknown     Passive Exposure: Not on file       Alcohol Use: Not on file       OBJECTIVE:     Vital Signs Range:  BMI Readings from Last 1 Encounters:   01/28/24 22.96 kg/m²       Temp:  [37.4 °C (99.3 °F)-37.4 °C (99.4 °F)]   Pulse:  []   Resp:  [12-28]   BP: (114-156)/(60-93)   SpO2:  [94 %-98 %]        Significant Labs:        Component Value Date/Time    WBC 8.32 01/29/2024 0014    HGB 12.0 (L) 01/29/2024 0014    HCT 36.9 (L) 01/29/2024 0014     01/29/2024 0014     (L) 01/29/2024 0014    K 4.3 01/29/2024 0014     01/29/2024 0014    CO2 22 (L) 01/29/2024 0014     (H) 01/29/2024 0014    BUN 35 (H) 01/29/2024 0014    CREATININE 1.4 01/29/2024 0014    MG 2.8 (H)  01/29/2024 0014    PHOS 3.4 01/29/2024 0014    CALCIUM 9.2 01/29/2024 0014    ALBUMIN 3.5 01/29/2024 0014    PROT 6.8 01/29/2024 0014    ALKPHOS 87 01/29/2024 0014    BILITOT 0.7 01/29/2024 0014    AST 16 01/29/2024 0014    ALT 14 01/29/2024 0014    INR 1.0 01/29/2024 0014    HGBA1C 8.2 (H) 01/28/2024 0330        Please see Results Review for additional labs.     Diagnostic Studies: No relevant studies.    EKG:   Results for orders placed or performed during the hospital encounter of 01/27/24   EKG, 12 - Lead    Collection Time: 01/27/24  8:37 PM    Narrative    Test Reason : I63.9,    Vent. Rate : 087 BPM     Atrial Rate : 087 BPM     P-R Int : 168 ms          QRS Dur : 140 ms      QT Int : 458 ms       P-R-T Axes : 081 -68 090 degrees     QTc Int : 551 ms    Sinus rhythm with Fusion complexes and Premature atrial complexes with   Aberrant conduction  Right bundle branch block  Left anterior fascicular block   Bifascicular block   LVH with repolarization abnormality  Abnormal ECG  No previous ECGs available  Confirmed by Anup PEGUERO MD ([id number]) on 1/28/2024 3:11:40 PM    Referred By: SCOOTER HWITE           Confirmed By:Anup PEGUERO MD       ECHO:  See subjective, if available.      ASSESSMENT/PLAN:                                                                                                                  01/29/2024  Pierre Jama is a 89 y.o., male.      Pre-op Assessment    I have reviewed the Patient Summary Reports.     I have reviewed the Nursing Notes.    I have reviewed the Medications.     Review of Systems  Anesthesia Hx:  No problems with previous Anesthesia   History of prior surgery of interest to airway management or planning:          Denies Family Hx of Anesthesia complications.    Denies Personal Hx of Anesthesia complications.                    Hematology/Oncology:    Oncology Normal                                   Cardiovascular:     Hypertension    Dysrhythmias atrial fibrillation  Denies  Angina.        ECG has been reviewed.                          Pulmonary:       Denies Shortness of breath.  Denies Recent URI.                 Renal/:  Renal/ Normal                 Hepatic/GI:     GERD Denies Liver Disease.            Neurological:   CVA    Denies Seizures.                                Endocrine:  Diabetes               Physical Exam    Airway:  Mallampati: II   Mouth Opening: Normal  Tongue: Normal    Dental:        Anesthesia Plan  Type of Anesthesia, risks & benefits discussed:    Anesthesia Type: Gen Natural Airway, MAC  Intra-op Monitoring Plan: Standard ASA Monitors  Post Op Pain Control Plan: multimodal analgesia and IV/PO Opioids PRN  Induction:  IV  Airway Plan: Direct, Post-Induction  Informed Consent: Informed consent signed with the Patient representative and all parties understand the risks and agree with anesthesia plan.  All questions answered.   ASA Score: 3  Day of Surgery Review of History & Physical: H&P Update referred to the surgeon/provider.    Ready For Surgery From Anesthesia Perspective.     .

## 2024-01-29 NOTE — ASSESSMENT & PLAN NOTE
89 y/o M w/ L ICA occlusion and L MCA occlusion. S/p thrombectomy w/ TiCi3 reperfusion and stent in L ICA.       Antithrombotics for secondary stroke prevention: Antiplatelets: Aspirin: 300 mg CO daily; will start Plavix once enteral access established    Statins for secondary stroke prevention and hyperlipidemia, if present:   Statins: Atorvastatin- 40 mg daily once enteral access established    Aggressive risk factor modification: A-Fib     Rehab efforts: The patient has been evaluated by a stroke team provider and the therapy needs have been fully considered based off the presenting complaints and exam findings. The following therapy evaluations are needed: PT evaluate and treat, OT evaluate and treat, SLP evaluate and treat, PM&R evaluate for appropriate placement    Diagnostics ordered/pending: MRI head without contrast to assess brain parenchyma, TTE to assess cardiac function/status  Unclear if pacemaker is MRI compatible; no documentation from outside hospital    VTE prophylaxis: Heparin 5000 units SQ every 8 hours    BP parameters: Infarct: Post sucessful thrombectomy, SBP <140    -Admitted to [facility]  -Vascular Neurology following  -VS/Neuro checks q1h  -SBP goal < 140   -PRN labetalol/hydralazine  -Atorvastatin daily once enteral access established  -ASA/Plavix once enteral access established - continue ASA CO for now  -PT/OT/SLP  -GI consulted to assist w/ enteral access  -CBC, CMP, Mag, Phos daily

## 2024-01-29 NOTE — PROVATION PATIENT INSTRUCTIONS
Discharge Summary/Instructions after an Endoscopic Procedure  Patient Name: Pierre Jama  Patient MRN: [id number]  Patient YOB: 1934 Monday, January 29, 2024  Kurt Munoz MD  Dear patient,  As a result of recent federal legislation (The Federal Cures Act), you may   receive lab or pathology results from your procedure in your [facility]   account before your physician is able to contact you. Your physician or   their representative will relay the results to you with their   recommendations at their soonest availability.  Thank you,  RESTRICTIONS:  During your procedure today, you received medications for sedation.  These   medications may affect your judgment, balance and coordination.  Therefore,   for 24 hours, you have the following restrictions:   - DO NOT drive a car, operate machinery, make legal/financial decisions,   sign important papers or drink alcohol.    ACTIVITY:  Today: no heavy lifting, straining or running due to procedural   sedation/anesthesia.  The following day: return to full activity including work.  DIET:  Eat and drink normally unless instructed otherwise.     TREATMENT FOR COMMON SIDE EFFECTS:  - Mild abdominal pain, nausea, belching, bloating or excessive gas:  rest,   eat lightly and use a heating pad.  - Sore Throat: treat with throat lozenges and/or gargle with warm salt   water.  - Because air was used during the procedure, expelling large amounts of air   from your rectum or belching is normal.  - If a bowel prep was taken, you may not have a bowel movement for 1-3 days.    This is normal.  SYMPTOMS TO WATCH FOR AND REPORT TO YOUR PHYSICIAN:  1. Abdominal pain or bloating, other than gas cramps.  2. Chest pain.  3. Back pain.  4. Signs of infection such as: chills or fever occurring within 24 hours   after the procedure.  5. Rectal bleeding, which would show as bright red, maroon, or black stools.   (A tablespoon of blood from the rectum is not serious, especially  if   hemorrhoids are present.)  6. Vomiting.  7. Weakness or dizziness.  GO DIRECTLY TO THE NEAREST EMERGENCY ROOM IF YOU HAVE ANY OF THE FOLLOWING:      Difficulty breathing              Chills and/or fever over 101 F   Persistent vomiting and/or vomiting blood   Severe abdominal pain   Severe chest pain   Black, tarry stools   Bleeding- more than one tablespoon   Any other symptom or condition that you feel may need urgent attention  Your doctor recommends these additional instructions:  If any biopsies were taken, your doctors clinic will contact you in 1 to 2   weeks with any results.  - Return patient to ICU for ongoing care.   - Resume previous diet.   - Continue present medications.  For questions, problems or results please call your physician - Kurt Munoz MD at Work:  [phone].  [facility], EMERGENCY ROOM PHONE NUMBER: [phone]  IF A COMPLICATION OR EMERGENCY SITUATION ARISES AND YOU ARE UNABLE TO REACH   YOUR PHYSICIAN - GO DIRECTLY TO THE EMERGENCY ROOM.  Kurt Munoz MD  1/29/2024 11:15:08 AM  This report has been verified and signed electronically.  Dear patient,  As a result of recent federal legislation (The Federal Cures Act), you may   receive lab or pathology results from your procedure in your [facility]   account before your physician is able to contact you. Your physician or   their representative will relay the results to you with their   recommendations at their soonest availability.  Thank you,  PROVATION

## 2024-01-29 NOTE — TREATMENT PLAN
GI Post-Procedure Treatment Plan    EGD complete    -8 cm paraesophageal hernia was found  -Otherwise normal EGD  -18 Fr NG tube placed under endoscopic guidance. Confirmed in stomach via scope visualization    Obtain KUB to confirm NG placement  GI will sign off    King Baker  Gastroenterology Fellow, PGY-V

## 2024-01-29 NOTE — PT/OT/SLP PROGRESS
Speech Language Pathology      Pierre Jama  MRN: [id number]    Patient not seen today secondary to  (team and RN agreed appropriate to hold off on bedside swallow evaluation until tomorrow. Pt underwent EGD with NG T placement at bedside and was still groggy.). Will re-attempt on 1/30.

## 2024-01-29 NOTE — NURSING
Pt arrived back to room following CT        Pt was escorted by RN on cardiac monitoring, O2, and ambu bag.        Patient placed back on bedside monitor with alarms audible, bed in low position with bed alarm on, call light within reach. TONYA.

## 2024-01-29 NOTE — PROGRESS NOTES
[facility]  Neurocritical Care  Progress Note    Admit Date: 1/27/2024  Service Date: 01/29/2024  Length of Stay: 2    Subjective:     Chief Complaint: Embolic stroke involving left middle cerebral artery    History of Present Illness: Patient is an 89-year-old male with history of atrial fibrillation on anticoagulation, hypertension who presents a transfer from an outside hospital for an acute stroke needing thrombectomy.  Last known normal at 1159 pm.  He initially presented with right-sided weakness, right-sided facial droop and aphasia.  Imaging showed a large vessel occlusion so patient was transferred here for thrombectomy.  No TNK outside of window. Patient s/p thrombectomy TiCi3 with stents in left ICA. Admit to [facility] for neuro monitoring and higher level of care.     Hospital Course: 01/28/2024 Unable to place NG/Keotube, ASA load given RI but unable to administer Plavix. Discussed w/ Vascular Neurology, ok for ASA alone for now. GI consulted for assistance w/ enteral access, plan to see patent tomorrow. Metoprolol x 2 , 500cc NS, and 2g mag given for tachycardia. Discussion w/ wife, aravind @ bedside and daughter on phone, code status changed to DNR.  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, pending speech eval    Interval History:      Review of Systems   Unable to perform ROS: Patient nonverbal       Objective:     Vitals:  Temp: 99.3 °F (37.4 °C)  Pulse:  77  Rhythm: paced rhythm  BP: (!) 86/53  MAP (mmHg): 66  Resp: 17  SpO2: (!) 94 %    Temp  Min: 98.6 °F (37 °C)  Max: 100.4 °F (38 °C)  Pulse  Min: 70  Max: 135  BP  Min: 81/51  Max: 198/99  MAP (mmHg)  Min: 61  Max: 142  Resp  Min: 10  Max: 34  SpO2  Min: 94 %  Max: 99 %    01/28 0701 - 01/29 0700  In: 579 [I.V.:79.6]  Out: 710 [Urine:710]   Unmeasured Output  Urine Occurrence: 1  Stool Occurrence: 0  Pad Count: 1        Physical Exam  Eyes:      Pupils: Pupils are equal, round, and reactive to light.      Comments: Left gaze deviation   Cardiovascular:      Rate and Rhythm: Normal rate.   Pulmonary:      Breath sounds: Normal breath sounds.   Abdominal:      Palpations: Abdomen is soft.   Musculoskeletal:         General: No swelling.      Cervical back: Neck supple.   Skin:     General: Skin is warm.   Neurological:      Mental Status: He is alert.      Comments: Dense right hemiplegia              Medications:  Continuoussodium chloride 0.9%, Last Rate: 50 mL/hr at 01/29/24 1500  nicardipine, Last Rate: 5 mg/hr (01/29/24 1500)    Scheduled[START ON 1/30/2024] aspirin, 81 mg, Daily  atorvastatin, 40 mg, Daily  clopidogreL, 75 mg, Daily  heparin (porcine), 5,000 Units, Q8H  metoprolol tartrate, 25 mg, QID  mupirocin, , BID    PRNacetaminophen, 650 mg, Q6H PRN  bisacodyL, 10 mg, Daily PRN  dextrose 10%, 12.5 g, PRN  dextrose 10%, 25 g, PRN  glucagon (human recombinant), 1 mg, PRN  hydrALAZINE, 10 mg, Q4H PRN  insulin aspart U-100, 0-10 Units, Q6H PRN  labetalol, 10 mg, Q4H PRN  magnesium oxide, 800 mg, PRN  magnesium oxide, 800 mg, PRN  ondansetron, 4 mg, Q8H PRN  oxymetazoline, 2 spray, BID PRN  potassium bicarbonate, 35 mEq, PRN  potassium bicarbonate, 50 mEq, PRN  potassium bicarbonate, 60 mEq, PRN  potassium, sodium phosphates, 2 packet, PRN  potassium, sodium phosphates, 2 packet, PRN  potassium, sodium phosphates, 2 packet, PRN  sodium chloride 0.9%, 10 mL, PRN  sodium chloride 0.9%, 10 mL, PRN      Today I  personally reviewed pertinent medications, lines/drains/airways, imaging, cardiology results, laboratory results, notably:    Diet  Diet NPO  Diet NPO        Assessment/Plan:     Neuro  Neurologic deficit due to acute ischemic stroke  Left mca thromboembolic stroke  Antithrombotics for secondary stroke prevention: Antiplatelets: Aspirin: 81 mg daily  Clopidogrel: 75 mg daily    Statins for secondary stroke prevention and hyperlipidemia, if present:   Statins: Atorvastatin- 40 mg daily    Aggressive risk factor modification: HTN, HLD     Rehab efforts: The patient has been evaluated by a stroke team provider and the therapy needs have been fully considered based off the presenting complaints and exam findings. The following therapy evaluations are needed: SLP evaluate and treat    Diagnostics ordered/pending: TTE to assess cardiac function/status     VTE prophylaxis: Heparin 5000 units SQ every 8 hours    BP parameters: Infarct: Post sucessful thrombectomy, SBP <140        Cardiac/Vascular  Prolonged Q-T interval on ECG  -Avoid QTc-prolonging agents  Pacer in place, adjust beta blockade for rate control    Endocrine  Diabetes mellitus  -Hgb A1c 8.2  -Accuchecks q6 w/ low dose SSI          The patient is being Prophylaxed for:  Venous Thromboembolism with: Chemical  Stress Ulcer with: None  Ventilator Pneumonia with: not applicable    Activity Orders            Progressive Mobility Protocol (mobilize patient to their highest level of functioning at least twice daily) starting at 01/27 2000    Turn patient starting at 01/27 1600    Elevate HOB starting at 01/27 1540    Diet NPO: NPO starting at 01/27 1540        CRC 40 min  DNR    Marco Barrios MD  Neurocritical Care  [facility]

## 2024-01-29 NOTE — PROGRESS NOTES
[facility]  Neurocritical Care  Progress Note    Admit Date: 1/27/2024  Service Date: 01/29/2024  Length of Stay: 2    Subjective:     Chief Complaint: Embolic stroke involving left middle cerebral artery    History of Present Illness: Patient is an 89-year-old male with history of atrial fibrillation on anticoagulation, hypertension who presents a transfer from an outside hospital for an acute stroke needing thrombectomy.  Last known normal at 1159 pm.  He initially presented with right-sided weakness, right-sided facial droop and aphasia.  Imaging showed a large vessel occlusion so patient was transferred here for thrombectomy.  No TNK outside of window. Patient s/p thrombectomy TiCi3 with stents in left ICA. Admit to [facility] for neuro monitoring and higher level of care.     Hospital Course: 01/28/2024 Unable to place NG/Keotube, ASA load given CA but unable to administer Plavix. Discussed w/ Vascular Neurology, ok for ASA alone for now. GI consulted for assistance w/ enteral access, plan to see patent tomorrow. Metoprolol x 2 , 500cc NS, and 2g mag given for tachycardia. Discussion w/ wife, aravind @ bedside and daughter on phone, code status changed to DNR.    Interval History:  See hospital course.    Review of Systems   Unable to perform ROS: Patient nonverbal     Objective:     Vitals:  Temp: 99.3 °F (37.4 °C)  Pulse: 80  Rhythm: normal sinus rhythm  BP: 134/63  MAP (mmHg): 91  Resp: 17  SpO2: 96 %    Temp  Min: 98.7 °F (37.1 °C)  Max: 100 °F (37.8 °C)  Pulse  Min: 70  Max: 136  BP  Min: 91/76  Max: 167/82  MAP (mmHg)  Min: 73  Max: 115  Resp  Min: 10  Max: 42  SpO2  Min: 94 %  Max: 99 %    01/27 0701 - 01/28 0700  In: 900   Out: 585 [Urine:585]   Unmeasured Output  Urine Occurrence: 1  Stool Occurrence: 0  Pad Count: 2        Physical Exam  Vitals and nursing note reviewed.   Eyes:      Pupils: Pupils are equal, round, and reactive to light.   Cardiovascular:      Rate and Rhythm: Regular  rhythm. Tachycardia present.      Pulses: Normal pulses.   Pulmonary:      Effort: Pulmonary effort is normal.   Abdominal:      Palpations: Abdomen is soft.   Skin:     General: Skin is warm and dry.      Capillary Refill: Capillary refill takes 2 to 3 seconds.   Neurological:      Mental Status: He is lethargic.      GCS: GCS eye subscore is 3. GCS verbal subscore is 1. GCS motor subscore is 5.      Cranial Nerves: Facial asymmetry present.      Comments:   -E3 V1 M5  -PERRL  -Global aphasia  -L gaze preference  -R facial droop  -Localizes w/ LUE/LLE; moves purposefully/spontaneously  -Wtihdraws w/ RUE/RLE       Unable to test orientation, language, memory, judgment, insight, fund of knowledge, hearing, shoulder shrug, tongue protrusion, coordination, gait due to level of consciousness.       Medications:  Continuous Scheduled[START ON 1/29/2024] aspirin, 300 mg, Daily  atorvastatin, 40 mg, Daily  clopidogreL, 150 mg, Once  mupirocin, , BID    PRNacetaminophen, 650 mg, Q6H PRN  bisacodyL, 10 mg, Daily PRN  dextrose 10%, 12.5 g, PRN  dextrose 10%, 25 g, PRN  glucagon (human recombinant), 1 mg, PRN  hydrALAZINE, 10 mg, Q4H PRN  insulin aspart U-100, 0-5 Units, Q6H PRN  labetalol, 10 mg, Q4H PRN  magnesium oxide, 800 mg, PRN  magnesium oxide, 800 mg, PRN  ondansetron, 4 mg, Q8H PRN  oxymetazoline, 2 spray, BID PRN  potassium bicarbonate, 35 mEq, PRN  potassium bicarbonate, 50 mEq, PRN  potassium bicarbonate, 60 mEq, PRN  potassium, sodium phosphates, 2 packet, PRN  potassium, sodium phosphates, 2 packet, PRN  potassium, sodium phosphates, 2 packet, PRN  sodium chloride 0.9%, 10 mL, PRN  sodium chloride 0.9%, 10 mL, PRN      Today I personally reviewed pertinent medications, lines/drains/airways, imaging, cardiology results, laboratory results, notably: CTH; CBC; CMP    Diet  Diet NPO  Diet NPO    Assessment/Plan:     Neuro  * Embolic stroke involving left middle cerebral artery  88 y/o M w/ L ICA occlusion and L MCA  occlusion. S/p thrombectomy w/ TiCi3 reperfusion and stent in L ICA.       Antithrombotics for secondary stroke prevention: Antiplatelets: Aspirin: 300 mg WA daily; will start Plavix once enteral access established    Statins for secondary stroke prevention and hyperlipidemia, if present:   Statins: Atorvastatin- 40 mg daily once enteral access established    Aggressive risk factor modification: A-Fib     Rehab efforts: The patient has been evaluated by a stroke team provider and the therapy needs have been fully considered based off the presenting complaints and exam findings. The following therapy evaluations are needed: PT evaluate and treat, OT evaluate and treat, SLP evaluate and treat, PM&R evaluate for appropriate placement    Diagnostics ordered/pending: MRI head without contrast to assess brain parenchyma, TTE to assess cardiac function/status  Unclear if pacemaker is MRI compatible; no documentation from outside hospital    VTE prophylaxis: Heparin 5000 units SQ every 8 hours    BP parameters: Infarct: Post sucessful thrombectomy, SBP <140    -Admitted to [facility]  -Vascular Neurology following  -VS/Neuro checks q1h  -SBP goal < 140   -PRN labetalol/hydralazine  -Atorvastatin daily once enteral access established  -ASA/Plavix once enteral access established - continue ASA WA for now  -PT/OT/SLP  -GI consulted to assist w/ enteral access  -CBC, CMP, Mag, Phos daily    Right hemiplegia  -PT/OT    Aphasia  -SLP    Cardiac/Vascular  Prolonged Q-T interval on ECG  -Avoid QTc-prolonging agents    HLD (hyperlipidemia)  -Atorvastatin daily once enteral access established    HTN (hypertension)  -SBP goal <  140  -PRN labetalol/hydralazine    A-fib  -Hx of  -NSR w/ episodes of wide QRS tachycardia  -Metoprolol x2, 500cc NS bolus, 2g mag  -Hold home Xarelto    Endocrine  Diabetes mellitus  -Hgb A1c 8.2  -Accuchecks q6 w/ low dose SSI    Other  Debility  -PT/OT          The patient is being Prophylaxed for:  Venous  Thromboembolism with: Mechanical or Chemical  Stress Ulcer with: Not Applicable   Ventilator Pneumonia with: not applicable    Activity Orders            Progressive Mobility Protocol (mobilize patient to their highest level of functioning at least twice daily) starting at 01/27 2000    Turn patient starting at 01/27 1600    Elevate HOB starting at 01/27 1540    Diet NPO: NPO starting at 01/27 1540          DNR    Critical care time spent on the evaluation and treatment of severe organ dysfunction, review of pertinent labs and imaging studies, discussions with consulting providers and discussions with patient/family: 35 minutes.    Leisa Palencia NP  Neurocritical Care  [facility]

## 2024-01-29 NOTE — INTERVAL H&P NOTE
The patient has been examined and the H&P has been reviewed:    Pre-Procedure H and P Addendum    Patient seen and examined.  History and exam unchanged from prior history and physical.      Procedure: EGD with NGT placement  Indication: Oropharyngeal dysphagia, failed bedside NGT placement  ASA Class: per anesthesiology  Airway: normal  Neck Mobility: full range of motion  Mallampatti score: per anesthesia  History of anesthesia problems: no  Family history of anesthesia problems: no  Anesthesia Plan: MAC    Active Hospital Problems    Diagnosis  POA    *Embolic stroke involving left middle cerebral artery [I63.412]  Yes    Diabetes mellitus [E11.9]  Yes    A-fib [I48.91]  Yes     Chronic    HTN (hypertension) [I10]  Yes     Chronic    HLD (hyperlipidemia) [E78.5]  Yes     Chronic    Neurologic deficit due to acute ischemic stroke [I63.9, R29.818]  Yes    Aphasia [R47.01]  Yes    Right hemiplegia [G81.91]  Yes    Debility [R53.81]  Yes    Prolonged Q-T interval on ECG [R94.31]  Yes      Resolved Hospital Problems   No resolved problems to display.

## 2024-01-29 NOTE — TRANSFER OF CARE
Anesthesia Transfer of Care Note    Patient: Pierre Jama    Procedure(s) Performed: Procedure(s) (LRB):  EGD (ESOPHAGOGASTRODUODENOSCOPY) (N/A)    Patient location: ICU    Anesthesia Type: general    Post pain: adequate analgesia    Post assessment: no apparent anesthetic complications and tolerated procedure well    Post vital signs: stable    Level of consciousness: sedated and responds to stimulation    Nausea/Vomiting: no nausea/vomiting    Complications: none    Transfer of care protocol was followed      Last vitals: Visit Vitals  /58   Pulse 76   Temp 37   Resp 17   Ht    Wt    SpO2 98%   BMI

## 2024-01-29 NOTE — PLAN OF CARE
[facility]  Initial Discharge Assessment       Primary Care Provider: Estrada Ham MD    Admission Diagnosis: Embolic stroke involving left middle cerebral artery [I63.412]  Acute CVA (cerebrovascular accident) [I63.9]  Cerebrovascular accident (CVA), unspecified mechanism [I63.9]  Ischemic cerebrovascular accident (CVA) [I63.9]    Admission Date: 1/27/2024  Expected Discharge Date:     Transition of Care Barriers: None    Payor: HUMANA MANAGED MEDICARE / Plan: HUMANA MEDICARE HMO / Product Type: Capitation /     Extended Emergency Contact Information  Primary Emergency Contact: [place], [place]  Mobile Phone: [phone]  Relation: Other  Preferred language: English   needed? No    Discharge Plan A: Home with family  Discharge Plan B: Skilled Nursing Facility      [facility] [address]  [address]  [facility] [address]  Phone: [phone] Fax: [phone]    CM at bedside to discuss discharge planning assessment.   Patient lives with his wife in a one-story home.   Independent with ADL and does not use medical equipment.   Explained CM services during patient's stay in hospital.   My Health packet discussed and left with patient.    Initial Assessment (most recent)       Adult Discharge Assessment - 01/29/24 1123          Discharge Assessment    Assessment Type Discharge Planning Assessment     Confirmed/corrected address, phone number and insurance Yes     Confirmed Demographics Correct on Facesheet     Source of Information family     If unable to respond/provide information was family/caregiver contacted? Yes     Contact Name/Number Anitra Jama (wife)     Reason For Admission embolic stroke     People in Home spouse     Facility Arrived From: [facility]     Do you expect to return to your current living situation? Yes     Prior to hospitilization cognitive status: Unable to Assess      Current cognitive status: Unable to Assess     Walking or Climbing Stairs Difficulty no     Dressing/Bathing Difficulty no     Home Layout Able to live on 1st floor     Equipment Currently Used at Home none     Readmission within 30 days? No     Patient currently being followed by outpatient case management? No     Do you currently have service(s) that help you manage your care at home? No     Do you take prescription medications? Yes     Do you have prescription coverage? Yes     Coverage HUMANA MANAGED MEDICARE - HUMANA MEDICARE HMO -     Do you have any problems affording any of your prescribed medications? No     Is the patient taking medications as prescribed? yes     How do you get to doctors appointments? family or friend will provide     Are you on dialysis? No     Do you take coumadin? No     Discharge Plan A Home with family     Discharge Plan B Skilled Nursing Facility     DME Needed Upon Discharge  --   tbd    Discharge Plan discussed with: Spouse/sig other     Transition of Care Barriers None        Physical Activity    On average, how many days per week do you engage in moderate to strenuous exercise (like a brisk walk)? 0 days     On average, how many minutes do you engage in exercise at this level? 0 min        Financial Resource Strain    How hard is it for you to pay for the very basics like food, housing, medical care, and heating? Not hard at all        Housing Stability    In the last 12 months, was there a time when you were not able to pay the mortgage or rent on time? No     In the last 12 months, how many places have you lived? 1     In the last 12 months, was there a time when you did not have a steady place to sleep or slept in a shelter (including now)? No        Transportation Needs    In the past 12 months, has lack of transportation kept you from medical appointments or from getting medications? No     In the past 12 months, has lack of transportation kept you from meetings, work, or  from getting things needed for daily living? No        Food Insecurity    Within the past 12 months, you worried that your food would run out before you got the money to buy more. Never true     Within the past 12 months, the food you bought just didn't last and you didn't have money to get more. Never true        Stress    Do you feel stress - tense, restless, nervous, or anxious, or unable to sleep at night because your mind is troubled all the time - these days? Not at all        Social Connections    In a typical week, how many times do you talk on the phone with family, friends, or neighbors? More than three times a week     How often do you get together with friends or relatives? More than three times a week     How often do you attend Moravian or Rastafarian services? Never     Do you belong to any clubs or organizations such as Moravian groups, unions, fraternal or athletic groups, or school groups? No     How often do you attend meetings of the clubs or organizations you belong to? Never     Are you , , , , never , or living with a partner?         Alcohol Use    Q1: How often do you have a drink containing alcohol? Never     Q2: How many drinks containing alcohol do you have on a typical day when you are drinking? Patient does not drink     Q3: How often do you have six or more drinks on one occasion? Never

## 2024-01-29 NOTE — ASSESSMENT & PLAN NOTE
Left mca thromboembolic stroke  Antithrombotics for secondary stroke prevention: Antiplatelets: Aspirin: 81 mg daily  Clopidogrel: 75 mg daily    Statins for secondary stroke prevention and hyperlipidemia, if present:   Statins: Atorvastatin- 40 mg daily    Aggressive risk factor modification: HTN, HLD     Rehab efforts: The patient has been evaluated by a stroke team provider and the therapy needs have been fully considered based off the presenting complaints and exam findings. The following therapy evaluations are needed: SLP evaluate and treat    Diagnostics ordered/pending: TTE to assess cardiac function/status     VTE prophylaxis: Heparin 5000 units SQ every 8 hours    BP parameters: Infarct: Post sucessful thrombectomy, SBP <140

## 2024-01-29 NOTE — PLAN OF CARE
"[facility] Care Plan    POC reviewed with Pierre Jama and family at 1400. Pt is nonverbal and his wife verbalized understanding. Questions and concerns addressed. No acute events today. Pt progressing toward goals. Will continue to monitor. See below and flowsheets for full assessment and VS info.             Is this a stroke patient? yes- Stroke booklet reviewed with patient and family, risk factors identified for patient and stroke booklet remains at bedside for ongoing education.     Neuro:  [place] Coma Scale  Best Eye Response: 3-->(E3) to speech  Best Motor Response: 5-->(M5) localizes pain  Best Verbal Response: 1-->(V1) none  Crys Coma Scale Score: 9  Pupil PERRLA: yes     24 hr Temp:  [98.7 °F (37.1 °C)-100 °F (37.8 °C)]     CV:   Rhythm: normal sinus rhythm  BP goals:   SBP < 140  MAP > 65    Resp:           Plan:  wean from nasal cannula    GI/:     Diet/Nutrition Received: NPO  Last Bowel Movement: 01/26/24  Voiding Characteristics: external catheter    Intake/Output Summary (Last 24 hours) at 1/28/2024 1932  Last data filed at 1/28/2024 1705  Gross per 24 hour   Intake 20 ml   Output 645 ml   Net -625 ml     Unmeasured Output  Urine Occurrence: 1  Stool Occurrence: 0  Pad Count: 2    Labs/Accuchecks:  Recent Labs   Lab 01/28/24  0330   WBC 5.83   RBC 4.22*   HGB 12.4*   HCT 37.8*         Recent Labs   Lab 01/28/24  0330      K 4.4   CO2 22*      BUN 31*   CREATININE 1.7*   ALKPHOS 95   ALT 12   AST 14   BILITOT 0.6      Recent Labs   Lab 01/27/24 2011   INR 1.1   APTT 26.9    No results for input(s): "CPK", "CPKMB", "TROPONINI", "MB" in the last 168 hours.    Electrolytes: Electrolytes replaced  Accuchecks: Q6H    Gtts:      LDA/Wounds:    Nurses Note -- 4 Eyes      1/28/2024   7:32 PM      Skin assessed during: Q Shift Change      [] No Altered Skin Integrity Present    []Prevention Measures Documented      [] Yes- Altered Skin Integrity Present or Discovered   [] LDA Added if Not " in Epic (Describe Wound)   [] New Altered Skin Integrity was Present on Admit and Documented in LDA   [] Wound Image Taken    Wound Care Consulted? No    Attending Nurse:  Farrah Gaytan RN/Staff Member:  Esteban DINH      [facility]

## 2024-01-29 NOTE — ASSESSMENT & PLAN NOTE
-Hx of  -NSR w/ episodes of wide QRS tachycardia  -Metoprolol x2, 500cc NS bolus, 2g mag  -Hold home Xarelto

## 2024-01-30 PROBLEM — R50.9 FEVER: Status: ACTIVE | Noted: 2024-01-01

## 2024-01-30 NOTE — PROGRESS NOTES
Pharmacokinetic Assessment: IV Vancomycin  Progress Note      Assessment/Plan:  - IV vancomycin being started for c/f possible PNA  - Goal trough 10-20 mcg/mL  - Resolving JULIA (Scr 1.0, UOP 0.3ml/kg/hr)  -  Received one-time load of 1.5g, followed by 1g Q24H  - Collect trough on 1/1 at 1200, or sooner if clinically indicated      Pharmacy will continue to follow and monitor vancomycin.    Please contact pharmacy at extension [phone] for questions regarding this assessment.      Bharati Mota, PharmD  Neurocritical Care Clinical Pharmacist  Ext. [phone]          Patient brief summary:  Pierre Jama is a 89 y.o. male initiated on antimicrobial therapy with IV Vancomycin for treatment of lower respiratory infection      Drug Allergies:   Review of patient's allergies indicates:  Not on File      Renal Function:   Estimated Creatinine Clearance: 46.8 mL/min (based on SCr of 1.1 mg/dL).,     Dialysis Method (if applicable):  N/A

## 2024-01-30 NOTE — PLAN OF CARE
[facility] Care Plan    POC reviewed with Pierre Jama and family at 1400. Pt verbalized understanding. Questions and concerns addressed. No acute events today. Pt progressing toward goals. Will continue to monitor. See below and flowsheets for full assessment and VS info.   -pt more drowsy and opening eyes less to verbal stimulation, pt intermittently opens eyes to painful stimuli, [facility] aware  -lactic, blood cultures, and lactic sent  -vanc and zosyn started  -tylenol given for fever  -pt on 3L NC  -Hydralazine started  -trickle feeds started  -CPT and cough assist started  -Cardene gtt continued  -family educated, waiting for daughters to arrive on Friday to help make decisions           Is this a stroke patient? yes- Stroke booklet reviewed with patient and family, risk factors identified for patient and stroke booklet remains at bedside for ongoing education.     Neuro:  Crys Coma Scale  Best Eye Response: 3-->(E3) to speech  Best Motor Response: 5-->(M5) localizes pain  Best Verbal Response: 1-->(V1) none  Crys Coma Scale Score: 9  Assessment Qualifiers: patient not sedated/intubated, no eye obstruction present  Pupil PERRLA: yes     24 hr Temp:  [98.7 °F (37.1 °C)-100.8 °F (38.2 °C)]     CV:   Rhythm: paced rhythm  BP goals:   SBP < 140  MAP > 65    Resp:           Plan: N/A    GI/:     Diet/Nutrition Received: NPO  Last Bowel Movement: 01/26/24  Voiding Characteristics: external catheter    Intake/Output Summary (Last 24 hours) at 1/30/2024 1648  Last data filed at 1/30/2024 1600  Gross per 24 hour   Intake 1695.71 ml   Output 500 ml   Net 1195.71 ml       Unmeasured Output  Urine Occurrence: 1  Stool Occurrence: 0  Pad Count: 2    Labs/Accuchecks:  Recent Labs   Lab 01/30/24  0112   WBC 8.01   RBC 3.85*   HGB 11.5*   HCT 35.1*           Recent Labs   Lab 01/30/24  0112      K 4.0   CO2 20*      BUN 32*   CREATININE 1.1   ALKPHOS 87   ALT 21   AST 30   BILITOT 1.0        Recent Labs  "  Lab 01/29/24  0014   INR 1.0   APTT 24.8      No results for input(s): "CPK", "CPKMB", "TROPONINI", "MB" in the last 168 hours.    Electrolytes: N/A - electrolytes WDL  Accuchecks: Q6H    Gtts:   nicardipine 2.5 mg/hr (01/30/24 1600)       LDA/Wounds:    Nurses Note -- 4 Eyes      1/30/2024   4:44 PM      Skin assessed during: Daily Assessment      [x] No Altered Skin Integrity Present    []Prevention Measures Documented      [] Yes- Altered Skin Integrity Present or Discovered   [] LDA Added if Not in Epic (Describe Wound)   [] New Altered Skin Integrity was Present on Admit and Documented in LDA   [] Wound Image Taken    Wound Care Consulted? No    Attending Nurse:  Celine Gaytan RN/Staff Member:          "

## 2024-01-30 NOTE — PLAN OF CARE
"[facility] Care Plan    POC reviewed with Pierre Jama and family at 1400. Pt verbalized understanding. Questions and concerns addressed. No acute events today. Pt progressing toward goals. Will continue to monitor. See below and flowsheets for full assessment and VS info.   -lactic, blood cultures, and lactic sent  -vanc and zosyn started  -tylenol given for fever  -pt on 3L NC  -Hydralazine started  -trickle feeds started  -CPT and cough assist started  -Cardene gtt continued  -family educated, waiting for daughters to arrive on Friday to help make decisions           Is this a stroke patient? yes- Stroke booklet reviewed with patient and family, risk factors identified for patient and stroke booklet remains at bedside for ongoing education.     Neuro:  Crys Coma Scale  Best Eye Response: 3-->(E3) to speech  Best Motor Response: 5-->(M5) localizes pain  Best Verbal Response: 1-->(V1) none  [place] Coma Scale Score: 9  Assessment Qualifiers: patient not sedated/intubated, no eye obstruction present  Pupil PERRLA: yes     24 hr Temp:  [98.7 °F (37.1 °C)-100.8 °F (38.2 °C)]     CV:   Rhythm: paced rhythm  BP goals:   SBP < 140  MAP > 65    Resp:           Plan: N/A    GI/:     Diet/Nutrition Received: NPO  Last Bowel Movement: 01/26/24  Voiding Characteristics: external catheter    Intake/Output Summary (Last 24 hours) at 1/30/2024 1644  Last data filed at 1/30/2024 1600  Gross per 24 hour   Intake 1695.71 ml   Output 500 ml   Net 1195.71 ml     Unmeasured Output  Urine Occurrence: 1  Stool Occurrence: 0  Pad Count: 2    Labs/Accuchecks:  Recent Labs   Lab 01/30/24  0112   WBC 8.01   RBC 3.85*   HGB 11.5*   HCT 35.1*         Recent Labs   Lab 01/30/24  0112      K 4.0   CO2 20*      BUN 32*   CREATININE 1.1   ALKPHOS 87   ALT 21   AST 30   BILITOT 1.0      Recent Labs   Lab 01/29/24  0014   INR 1.0   APTT 24.8    No results for input(s): "CPK", "CPKMB", "TROPONINI", "MB" in the last 168 " hours.    Electrolytes: N/A - electrolytes WDL  Accuchecks: Q6H    Gtts:   nicardipine 2.5 mg/hr (01/30/24 1600)       LDA/Wounds:    Nurses Note -- 4 Eyes      1/30/2024   4:44 PM      Skin assessed during: Daily Assessment      [x] No Altered Skin Integrity Present    []Prevention Measures Documented      [] Yes- Altered Skin Integrity Present or Discovered   [] LDA Added if Not in Epic (Describe Wound)   [] New Altered Skin Integrity was Present on Admit and Documented in LDA   [] Wound Image Taken    Wound Care Consulted? No    Attending Nurse:  Celine Gaytan RN/Staff Member:

## 2024-01-30 NOTE — ASSESSMENT & PLAN NOTE
Pierre Jama is a 89 y.o. male with PMH of HTN, HLD, Afib (on xarelto) that was transferred to [facility] [place] with acute L MCA syndrome for intervention of L M1 occlusion. Patient admitted to OSH 1/25 for PNA, hypoxic respiratory failure, and sepsis. On 1/27 found to have new RSW, aphasia, L gaze. LKW midnight 1/27, OOW for TNK. Telestroke NIHSS 26. CTA with L M1 occlusion, CTH with good ASPECTS. On arrival to [facility] [place], neuro exam stable with NIHSS 24. Patient taken immediately to IR, now s/p L M1 thrombectomy with TICI 3 reperfusion as well as L ICA angioplasty and stenting.     Neuro exam stable. Continue to be on cardene drip. NGT in place. SLP recommend NPO.       Antithrombotics for secondary stroke prevention: Antiplatelets: Aspirin: 81 mg daily  Clopidogrel: 75 mg daily for DAPT due to stent. NGT in place now.     Statins for secondary stroke prevention and hyperlipidemia, if present: Statins: Atorvastatin- 40 mg daily    Aggressive risk factor modification: HTN, HLD, Diet, Exercise, Obesity, A-Fib     Rehab efforts: The patient has been evaluated by a stroke team provider and the therapy needs have been fully considered based off the presenting complaints and exam findings. The following therapy evaluations are needed: PT evaluate and treat, OT evaluate and treat, SLP evaluate and treat    Diagnostics ordered/pending: CT scan of head without contrast to asses brain parenchyma, MRI head without contrast to assess brain parenchyma, TTE to assess cardiac function/status     VTE prophylaxis: Heparin 5000 units SQ every 8 hours  Mechanical prophylaxis: Place SCDs    BP parameters: Infarct: Post sucessful thrombectomy, SBP <140

## 2024-01-30 NOTE — PLAN OF CARE
Problem: SLP  Goal: SLP Goal  Description: Speech Language Pathology Goals  Goals expected to be met by 2/13  1. Pt will participate in ongoing assessment of swallow.   2. Pt will answer simple y/n questions via any modality x3/session given max cues.  3. Pt will follow simple commands x3/session given max cues.   4. Pt will vocalize in response to any stim x3/session given max cues.   1/30/2024 1252 by Teri Turner MCD, CCC-SLP  Outcome: Ongoing, Progressing     Bedside swallow assessment completed.  Rec cont npo with strict aspiration precautions.

## 2024-01-30 NOTE — SUBJECTIVE & OBJECTIVE
Neurologic Chief Complaint: L MCA syndrome + L M1 occlusion    Subjective:     Interval History: Patient is seen for follow-up neurological assessment and treatment recommendations: Continue to have L MCA syndrome s/p thrombectomy. Tachycardia with wide complex QRS continue to be an issue. Pacer in place. Awaiting echo  NGT in place; DAPT started. SLP pending.     HPI, Past Medical, Family, and Social History remains the same as documented in the initial encounter.     Review of Systems   Unable to perform ROS: Other (severe aphasia)     Scheduled Meds:   [START ON 1/30/2024] aspirin  81 mg Per NG tube Daily    atorvastatin  40 mg Per NG tube Daily    benzocaine   Mouth/Throat Once    clopidogreL  75 mg Per NG tube Daily    heparin (porcine)  5,000 Units Subcutaneous Q8H    metoprolol tartrate  25 mg Per NG tube QID    mupirocin   Nasal BID     Continuous Infusions:   sodium chloride 0.9% 50 mL/hr at 01/29/24 2202    nicardipine 5 mg/hr (01/29/24 2202)     PRN Meds:acetaminophen, bisacodyL, dextrose 10%, dextrose 10%, glucagon (human recombinant), hydrALAZINE, insulin aspart U-100, labetalol, magnesium oxide, magnesium oxide, ondansetron, oxymetazoline, potassium bicarbonate, potassium bicarbonate, potassium bicarbonate, potassium, sodium phosphates, potassium, sodium phosphates, potassium, sodium phosphates, sodium chloride 0.9%, sodium chloride 0.9%    Objective:     Vital Signs (Most Recent):  Temp: (!) 100.5 °F (38.1 °C) (01/29/24 1902)  Pulse: (!) 144 (01/29/24 2232)  Resp: 19 (01/29/24 2232)  BP: 130/80 (01/29/24 2232)  SpO2: (!) 94 % (01/29/24 2232)  BP Location: Left arm    Vital Signs Range (Last 24H):  Temp:  [98.6 °F (37 °C)-100.5 °F (38.1 °C)]   Pulse:  []   Resp:  [10-34]   BP: ()/(51-99)   SpO2:  [93 %-99 %]   BP Location: Left arm       Physical Exam  Vitals and nursing note reviewed.   Constitutional:       Appearance: He is not toxic-appearing.   HENT:      Head: Normocephalic.       Nose: Nose normal.      Mouth/Throat:      Mouth: Mucous membranes are moist.   Eyes:      General: No scleral icterus.     Conjunctiva/sclera: Conjunctivae normal.   Cardiovascular:      Rate and Rhythm: Normal rate.   Pulmonary:      Effort: Pulmonary effort is normal. No respiratory distress.   Abdominal:      General: There is no distension.   Musculoskeletal:         General: No deformity or signs of injury.      Cervical back: Normal range of motion.   Skin:     General: Skin is warm.      Findings: No bruising.   Neurological:      Mental Status: He is lethargic.      Cranial Nerves: Dysarthria and facial asymmetry present.      Motor: Weakness present.   Psychiatric:         Speech: He is noncommunicative (severe aphasia).         Behavior: Behavior is cooperative.              Neurological Exam:   LOC: drowsy  Attention Span: poor  Language: Global aphasia  Articulation: Untestable due to severe aphasia   Orientation: Untestable due to severe aphasia   Visual Fields: Full  EOM (CN III, IV, VI): Gaze preference  left  Facial Movement (CN VII): Lower facial weakness on the Right  Motor: Arm left  Paresis: 4/5  Leg left  Paresis: 3/5  Arm right  Plegia 0/5  Leg right Plegia 0/5  Sensation: Anatoliy-hypoesthesia right    Laboratory:  CMP:   Recent Labs   Lab 01/29/24  0014   CALCIUM 9.2   ALBUMIN 3.5   PROT 6.8   *   K 4.3   CO2 22*      BUN 35*   CREATININE 1.4   ALKPHOS 87   ALT 14   AST 16   BILITOT 0.7       CBC:   Recent Labs   Lab 01/29/24 0014   WBC 8.32   RBC 4.04*   HGB 12.0*   HCT 36.9*      MCV 91   MCH 29.7   MCHC 32.5       Lipid Panel:   Recent Labs   Lab 01/27/24 2011   CHOL 220*   LDLCALC 155.6   HDL 39*   TRIG 127       Coagulation:   Recent Labs   Lab 01/29/24  0014   INR 1.0   APTT 24.8       Hgb A1C:   Recent Labs   Lab 01/28/24  0330   HGBA1C 8.2*       TSH:   Recent Labs   Lab 01/27/24 2011   TSH 1.875         Diagnostic Results     Brain Imaging:  [facility] 1/27/2024 @  OSH  Radiology report not available. No acute ischemia with favorable ASPECTS.  Hyperdense sign in the left distal M1.         Vessel Imaging:  IR cerebral angiogram 1/27/2024  Preliminary interpretation: > 75% stenosis of the L ICA, treated to 0% w angioplasty and stenting; L MCA occlusion treated to TICI 3 w aspiration.  Please see Imaging report for full details.     CTA head/neck 1/27/2024 @ OSH  Impression:  1. There is acute clot in the left distal M1 and proximal superior branch M2 MCA with high-grade stenosis/LVO.  Moderate referral collaterals  2. Chronic LVO of the distal right vertebral artery.  High-grade stenosis of the proximal left vertebral artery greater than 70%  3. High-grade stenosis right carotid bifurcation 75%.  Moderate grade stenosis on the left 60%      Cardiac Imaging   TTE pending

## 2024-01-30 NOTE — PLAN OF CARE
"[facility] Care Plan    POC reviewed with Pierre Jama and family at 0300. Pt verbalized understanding. Questions and concerns addressed. No acute events overnight. Pt progressing toward goals. Will continue to monitor. See below and flowsheets for full assessment and VS info.     -Replacing phos    -Cardene @ 2.5   -NS @ 50   -HR and rhythm continue to fluctuate despite metoprolol admin. Tachycardic with wide QRS complexes (130-150s) >> NSR (70-80s) >> Paced rhythm         Is this a stroke patient? yes- Stroke booklet reviewed with patient and family, risk factors identified for patient and stroke booklet remains at bedside for ongoing education.     Neuro:  [place] Coma Scale  Best Eye Response: 3-->(E3) to speech  Best Motor Response: 5-->(M5) localizes pain  Best Verbal Response: 1-->(V1) none  Benoit Coma Scale Score: 9  Assessment Qualifiers: patient not sedated/intubated, no eye obstruction present  Pupil PERRLA: yes     24hr Temp:  [98.6 °F (37 °C)-100.8 °F (38.2 °C)]     CV:   Rhythm: paced rhythm  BP goals:   SBP < 140  MAP > 65    Resp:           Plan: N/A    GI/:     Diet/Nutrition Received: NPO  Last Bowel Movement: 01/26/24  Voiding Characteristics: external catheter    Intake/Output Summary (Last 24 hours) at 1/30/2024 0455  Last data filed at 1/30/2024 0402  Gross per 24 hour   Intake 1367.43 ml   Output 600 ml   Net 767.43 ml     Unmeasured Output  Urine Occurrence: 1  Stool Occurrence: 0  Pad Count: 1    Labs/Accuchecks:  Recent Labs   Lab 01/30/24  0112   WBC 8.01   RBC 3.85*   HGB 11.5*   HCT 35.1*         Recent Labs   Lab 01/30/24  0112      K 4.0   CO2 20*      BUN 32*   CREATININE 1.1   ALKPHOS 87   ALT 21   AST 30   BILITOT 1.0      Recent Labs   Lab 01/29/24  0014   INR 1.0   APTT 24.8    No results for input(s): "CPK", "CPKMB", "TROPONINI", "MB" in the last 168 hours.    Electrolytes: Electrolytes replaced  Accuchecks: Q6H    Gtts:   sodium chloride 0.9% 50 mL/hr at " 01/30/24 0402    nicardipine 2.5 mg/hr (01/30/24 0402)       LDA/Wounds:    Nurses Note -- 4 Eyes      1/30/2024   4:55 AM      Skin assessed during: Daily Assessment      [] No Altered Skin Integrity Present    []Prevention Measures Documented      [] Yes- Altered Skin Integrity Present or Discovered   [] LDA Added if Not in Epic (Describe Wound)   [] New Altered Skin Integrity was Present on Admit and Documented in LDA   [] Wound Image Taken    Wound Care Consulted? No    Attending Nurse:  Farrah Gaytan RN/Staff Member:  Esteban DINH

## 2024-01-30 NOTE — NURSING
Pt aggressively sneezed NGT out. [facility]. New NGT placed at bedside and KUB complete. Left wrist remains in a restraint and NGT is secured.

## 2024-01-30 NOTE — ASSESSMENT & PLAN NOTE
Pierre Jama is a 89 y.o. male with PMH of HTN, HLD, Afib (on xarelto) [place] [facility] [place] for intervention of L M1 occlusion. Patient admitted to OSH 1/25 for PNA, hypoxic respiratory failure, and sepsis. On 1/27 found to have new RSW, aphasia, L gaze. LKW midnight 1/27, OOW for TNK. Telestroke NIHSS 26. CTA with L M1 occlusion, CTH with good ASPECTS. [place] [facility] [place], neuro exam stable with NIHSS 24. Patient taken immediately to IR, now s/p L M1 thrombectomy with TICI 3 reperfusion as well as L ICA angioplasty and stenting.     Continue to have L MCA syndrome s/p thrombectomy. Tachycardia with wide complex QRS continue to be an issue. Pacer in place. Awaiting echo  NGT in place; DAPT started. SLP pending.       Antithrombotics for secondary stroke prevention: Antiplatelets: Aspirin: 81 mg daily  Clopidogrel: 75 mg daily for DAPT due to stent. NGT in place now.     Statins for secondary stroke prevention and hyperlipidemia, if present: Statins: Atorvastatin- 40 mg daily    Aggressive risk factor modification: HTN, HLD, Diet, Exercise, Obesity, A-Fib     Rehab efforts: The patient has been evaluated by a stroke team provider and the therapy needs have been fully considered based off the presenting complaints and exam findings. The following therapy evaluations are needed: PT evaluate and treat, OT evaluate and treat, SLP evaluate and treat    Diagnostics ordered/pending: CT scan of head without contrast to asses brain parenchyma, MRI head without contrast to assess brain parenchyma, TTE to assess cardiac function/status     VTE prophylaxis: Heparin 5000 units SQ every 8 hours  Mechanical prophylaxis: Place SCDs    BP parameters: Infarct: Post sucessful thrombectomy, SBP <140

## 2024-01-30 NOTE — PLAN OF CARE
Recommendations   1. EN warranted: Isosource 1.5 to goal rate @ 50 ml/hr to provide 1800 kcals, 82 g of protein, and 917 ml of fluid.   2. RD following     Goals: Meet % of EEN/EPN by RD f/u date  Nutrition Goal Status: goal not met  Communication of RD Recs: POC

## 2024-01-30 NOTE — CONSULTS
"[facility]  Adult Nutrition  Consult Note    SUMMARY     Recommendations   1. EN warranted: Isosource 1.5 to goal rate @ 50 ml/hr to provide 1800 kcals, 82 g of protein, and 917 ml of fluid.   2. RD following    Goals: Meet % of EEN/EPN by RD f/u date  Nutrition Goal Status: goal not met  Communication of RD Recs: POC    Assessment and Plan    Nutrition Problem  Inadequate energy intake     Related to (etiology):   Physiological demands     Signs and Symptoms (as evidenced by):   NPO status     Interventions(treatment strategy):  Collaboration of nutrition care w/ other providers     Nutrition Diagnosis Status:   New       Reason for Assessment    Reason For Assessment: consult  Diagnosis: cardiac disease  Relevant Medical History: HTN, GERD  Interdisciplinary Rounds: did not attend  General Information Comments: RD consulted for tube feeding recommendations. Unsure of nutrition hx PTA. Limited weight hx provided per chart review. Unable to complete NFPE, will be completed during RD f/u. LBM noted-1/26/24  Nutrition Discharge Planning: pending clinical course    Nutrition Risk Screen    Nutrition Risk Screen: difficulty chewing/swallowing    Nutrition/Diet History    Food Allergies: other (see comments) (unable to assess)    Anthropometrics    Temp: 99.9 °F (37.7 °C)  Height: 5' 10" (177.8 cm)  Height (inches): 70 in  Weight Method: Bed Scale  Weight: 72.6 kg (160 lb)  Weight (lb): 160 lb  Ideal Body Weight (IBW), Male: 166 lb  % Ideal Body Weight, Male (lb): 96.39 %  BMI (Calculated): 23  BMI Grade: 18.5-24.9 - normal       Lab/Procedures/Meds    Pertinent Labs Reviewed: reviewed  Pertinent Labs Comments: Glucose 186  Pertinent Medications Reviewed: reviewed  Pertinent Medications Comments: -    Estimated/Assessed Needs    Weight Used For Calorie Calculations: 72.6 kg (160 lb 0.9 oz)  Energy Calorie Requirements (kcal): 1816  Energy Need Method: Alice-St Jeor (PAL 1.3)  Protein " Requirements:  g (1.2-1.5 g/kg)  Weight Used For Protein Calculations: 72.6 kg (160 lb 0.9 oz)        RDA Method (mL): 1816         Nutrition Prescription Ordered    Current Diet Order: NPO    Evaluation of Received Nutrient/Fluid Intake    I/O: -  Energy Calories Required: not meeting needs  Protein Required: not meeting needs  Fluid Required: not meeting needs  Total Fluid Intake (mL/kg): 1 ml or fluid per MD  Tolerance: tolerating  % Intake of Estimated Energy Needs: 0 - 25 %  % Meal Intake: NPO    Nutrition Risk    Level of Risk/Frequency of Follow-up: low ((1x/week))     Monitor and Evaluation    Food and Nutrient Intake: energy intake, food and beverage intake, enteral nutrition intake  Food and Nutrient Adminstration: enteral and parenteral nutrition administration, diet order  Knowledge/Beliefs/Attitudes: food and nutrition knowledge/skill, beliefs and attitudes  Physical Activity and Function: nutrition-related ADLs and IADLs, factors affecting access to physical activity  Anthropometric Measurements: height/length, weight, weight change, body mass index, growth pattern indices/percentile ranks  Biochemical Data, Medical Tests and Procedures: electrolyte and renal panel, gastrointestinal profile, glucose/endocrine profile, inflammatory profile, lipid profile  Nutrition-Focused Physical Findings: overall appearance, extremities, muscles and bones, skin, head and eyes       Nutrition Follow-Up    RD Follow-up?: Yes

## 2024-01-30 NOTE — PT/OT/SLP EVAL
"Speech Language Pathology Evaluation  Cognitive/Bedside Swallow    Patient Name:  Pierre Jama   MRN:  [id number]  Admitting Diagnosis: Embolic stroke involving left middle cerebral artery    Recommendations:                  General Recommendations:  Dysphagia therapy, Speech/language therapy, and Cognitive-linguistic therapy  Diet recommendations:  NPO, NPO   Aspiration Precautions: Frequent oral care and Strict aspiration precautions   General Precautions: Standard, aphasia, aspiration, fall, NPO  Communication strategies:  provide increased time to answer and go to room if call light pushed    Assessment:     Pierre Jama is a 89 y.o. male with an SLP diagnosis of Aphasia and Dysphagia.  He presents with poor sustained alertness.     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    Prior Intubation HX:  thrombectomy 1/27    Modified Barium Swallow: none this admission     Chest X-Rays: 1/29: "There is motion artifact.  There is a cardiac pacer, unchanged in position.  There is a nasogastric tube with the tip and side-port in the stomach.  The lungs are well expanded and clear.  No focal opacities are seen.  The pleural spaces are clear.  The cardiac silhouette is borderline.  There are calcifications of the aortic arch.  There are postop changes of the left shoulder.  Partially visualized loops of bowel are nonspecific."    Prior " "diet: reg/thin     Subjective     "This is definitely not him." Spouse    Nurse cleared for session.     Pain/Comfort:  Pain Rating 1:  (NAD)  Pain Rating Post-Intervention 1:  (NAD)    Respiratory Status: Room air    Objective:     Cognitive Status:    Unable to assess 2/2 severe aphasia      Receptive Language:   Comprehension:   impaired  Questions Simple yes/no no response  Commands  One step no response    Expressive Language:  Verbal:    Pt nonverbal despite max cues and multiple attempts       Motor Speech:  tba    Voice:   Spontaneous moaning with clear vocla quality, adequate intensity     Visual-Spatial:  tba    Reading:   tba      Written Expression:   tba    Oral Musculature Evaluation  Oral Musculature: unable to assess due to poor participation/comprehension, right weakness  Volitional Cough: not elicited  Volitional Swallow: not elicited  Voice Prior to PO Intake: intermittent moaning, clear    Bedside Swallow Eval:   Consistencies Assessed:  Thin liquids ice chip presented to lip, lips clenched, not accepted      Oral Phase:   Unable to assess     Pharyngeal Phase:   Unable to assess    Compensatory Strategies  None    Treatment: HOB raised upright and oral care provided 2/2 dry oral mucosa.  Pt clenching lips against oral care.  Intermittent cough noted with upper airway congested evident.  No further PO trials attempted 2/2 high aspiration risk.  Education provided to pt's spouse re: role of SLP, cont npo, aspiration risk, s/s aspiration, language stim, communication strategies, and POC.  Spouse verbalized understanding.      Goals:   Multidisciplinary Problems       SLP Goals          Problem: SLP    Goal Priority Disciplines Outcome   SLP Goal     SLP Ongoing, Progressing   Description: Speech Language Pathology Goals  Goals expected to be met by 2/13  1. Pt will participate in ongoing assessment of swallow.   2. Pt will answer simple y/n questions via any modality x3/session given max cues.  3. " Pt will follow simple commands x3/session given max cues.   4. Pt will vocalize in response to any stim x3/session given max cues.                                Plan:     Patient to be seen:  4 x/week   Plan of Care expires:  02/29/24  Plan of Care reviewed with:  patient, spouse   SLP Follow-Up:  Yes       Discharge recommendations:  Therapy Intensity Recommendations at Discharge: Moderate Intensity Therapy (pending ongoing assessment)   Barriers to Discharge:  Level of Skilled Assistance Needed      Time Tracking:     SLP Treatment Date:   01/30/24  Speech Start Time:  0701  Speech Stop Time:  0715     Speech Total Time (min):  14 min    Billable Minutes: Eval 7  and Eval Swallow and Oral Function 7    01/30/2024

## 2024-01-30 NOTE — PROGRESS NOTES
[facility]  Vascular Neurology  Comprehensive Stroke Center  Progress Note    Assessment/Plan:     * Embolic stroke involving left middle cerebral artery  Pierre Jama is a 89 y.o. male with PMH of HTN, HLD, Afib (on xarelto) that [place] [facility] [place] syndrome for intervention of L M1 occlusion. Patient admitted to OSH 1/25 for PNA, hypoxic respiratory failure, and sepsis. On 1/27 found to have new RSW, aphasia, L gaze. LKW midnight 1/27, OOW for TNK. Telestroke NIHSS 26. CTA with L M1 occlusion, CTH with good ASPECTS. [place] [facility] [place] neuro exam stable with NIHSS 24. Patient taken immediately to IR, now s/p L M1 thrombectomy with TICI 3 reperfusion as well as L ICA angioplasty and stenting.     Continue to have L MCA syndrome s/p thrombectomy. Tachycardia with wide complex QRS continue to be an issue. Pacer in place. Awaiting echo  NGT in place; DAPT started. SLP pending.       Antithrombotics for secondary stroke prevention: Antiplatelets: Aspirin: 81 mg daily  Clopidogrel: 75 mg daily for DAPT due to stent. NGT in place now.     Statins for secondary stroke prevention and hyperlipidemia, if present: Statins: Atorvastatin- 40 mg daily    Aggressive risk factor modification: HTN, HLD, Diet, Exercise, Obesity, A-Fib     Rehab efforts: The patient has been evaluated by a stroke team provider and the therapy needs have been fully considered based off the presenting complaints and exam findings. The following therapy evaluations are needed: PT evaluate and treat, OT evaluate and treat, SLP evaluate and treat    Diagnostics ordered/pending: CT scan of head without contrast to asses brain parenchyma, MRI head without contrast to assess brain parenchyma, TTE to assess cardiac function/status     VTE prophylaxis: Heparin 5000 units SQ every 8 hours  Mechanical prophylaxis: Place SCDs    BP parameters: Infarct: Post sucessful thrombectomy, SBP <140        Neurologic deficit due to  acute ischemic stroke  Aphasia   Right hemiplegia   -Due to stroke  -Aggressive therapy  -PT/OT/SLP eval and treat    HLD (hyperlipidemia)  -Stroke risk factor  -  -Atorvastatin 40mg daily for LDL goal <70    HTN (hypertension)  -Stroke risk factor  -SBP <140 s/p thrombectomy, maintain MAP >65  -PRN labetalol/hydralazine    A-fib  -Stroke risk factor  -On xarelto, reportedly stopped at OSH due to c/f anemia  -Will need to further discuss timing of when to resume AC for afib         01/28/2024 Continues to have significant L MCA deficits s/p thrombectomy. Overnight had episode of tachycardia in 130s, EKG with wide complex QRS likely ST. Improved with metoprolol. Continues to have intermittent episode of tachycardia per day RN. Unable to get PO access overnight due to tube coiling in stomach. Currently on ASA AL only, will start plavix for DAPT once PO route established. Repeat CTH, echo, therapy evals pending.  01/29/2024 Continue to have L MCA syndrome s/p thrombectomy. Tachycardia with wide complex QRS continue to be an issue. Pacer in place. Awaiting echo  NGT in place; DAPT started. SLP pending.     STROKE DOCUMENTATION   Acute Stroke Times   Last Known Normal Date: 01/27/24  Last Known Normal Time: 0000  Stroke Team Called Date: 01/27/24  Stroke Team Called Time: 1511  Stroke Team Arrival Date: 01/27/24  Stroke Team Arrival Time: 1511  CT Interpretation Time: 1210 (OSH imaging reviewed PTA)  Thrombolytic Therapy Recommended: No  CTA Interpretation Time: 1224 (OSH imaging reviewed PTA)  Thrombectomy Recommended: Yes  Decision to Treat Time for IR: 1511    NIH Scale:  1a. Level of Consciousness: 1-->Not alert, but arousable by minor stimulation to obey, answer, or respond  1b. LOC Questions: 2-->Answers neither question correctly  1c. LOC Commands: 2-->Performs neither task correctly  2. Best Gaze: 1-->Partial gaze palsy, gaze is abnormal in one or both eyes, but forced deviation or total gaze paresis is  not present  3. Visual: 0-->No visual loss  4. Facial Palsy: 2-->Partial paralysis (total or near-total paralysis of lower face)  5a. Motor Arm, Left: 2-->Some effort against gravity, limb cannot get to or maintain (if cued) 90 (or 45) degrees, drifts down to bed, but has some effort against gravity  5b. Motor Arm, Right: 3-->No effort against gravity, limb falls  6a. Motor Leg, Left: 2-->Some effort against gravity, leg falls to bed by 5 secs, but has some effort against gravity  6b. Motor Leg, Right: 3-->No effort against gravity, leg falls to bed immediately  7. Limb Ataxia: 0-->Absent  8. Sensory: 0-->Normal, no sensory loss  9. Best Language: 3-->Mute, global aphasia, no usable speech or auditory comprehension  10. Dysarthria: 2-->Severe dysarthria, patients speech is so slurred as to be unintelligible in the absence of or out of proportion to any dysphasia, or is mute/anarthric  11. Extinction and Inattention (formerly Neglect): 0-->No abnormality  Total (NIH Stroke Scale): 23       Modified Fontana Score: 0  Crys Coma Scale:    ABCD2 Score:    [id number] Score:5  HAS -BLED Score:   ICH Score:   Hunt & Chandler Classification:      Hemorrhagic change of an Ischemic Stroke: Does this patient have an ischemic stroke with hemorrhagic changes? No     Neurologic Chief Complaint: L MCA syndrome + L M1 occlusion    Subjective:     Interval History: Patient is seen for follow-up neurological assessment and treatment recommendations: Continue to have L MCA syndrome s/p thrombectomy. Tachycardia with wide complex QRS continue to be an issue. Pacer in place. Awaiting echo  NGT in place; DAPT started. SLP pending.     HPI, Past Medical, Family, and Social History remains the same as documented in the initial encounter.     Review of Systems   Unable to perform ROS: Other (severe aphasia)     Scheduled Meds:   [START ON 1/30/2024] aspirin  81 mg Per NG tube Daily    atorvastatin  40 mg Per NG tube Daily    benzocaine    Mouth/Throat Once    clopidogreL  75 mg Per NG tube Daily    heparin (porcine)  5,000 Units Subcutaneous Q8H    metoprolol tartrate  25 mg Per NG tube QID    mupirocin   Nasal BID     Continuous Infusions:   sodium chloride 0.9% 50 mL/hr at 01/29/24 2202    nicardipine 5 mg/hr (01/29/24 2202)     PRN Meds:acetaminophen, bisacodyL, dextrose 10%, dextrose 10%, glucagon (human recombinant), hydrALAZINE, insulin aspart U-100, labetalol, magnesium oxide, magnesium oxide, ondansetron, oxymetazoline, potassium bicarbonate, potassium bicarbonate, potassium bicarbonate, potassium, sodium phosphates, potassium, sodium phosphates, potassium, sodium phosphates, sodium chloride 0.9%, sodium chloride 0.9%    Objective:     Vital Signs (Most Recent):  Temp: (!) 100.5 °F (38.1 °C) (01/29/24 1902)  Pulse: (!) 144 (01/29/24 2232)  Resp: 19 (01/29/24 2232)  BP: 130/80 (01/29/24 2232)  SpO2: (!) 94 % (01/29/24 2232)  BP Location: Left arm    Vital Signs Range (Last 24H):  Temp:  [98.6 °F (37 °C)-100.5 °F (38.1 °C)]   Pulse:  []   Resp:  [10-34]   BP: ()/(51-99)   SpO2:  [93 %-99 %]   BP Location: Left arm       Physical Exam  Vitals and nursing note reviewed.   Constitutional:       Appearance: He is not toxic-appearing.   HENT:      Head: Normocephalic.      Nose: Nose normal.      Mouth/Throat:      Mouth: Mucous membranes are moist.   Eyes:      General: No scleral icterus.     Conjunctiva/sclera: Conjunctivae normal.   Cardiovascular:      Rate and Rhythm: Normal rate.   Pulmonary:      Effort: Pulmonary effort is normal. No respiratory distress.   Abdominal:      General: There is no distension.   Musculoskeletal:         General: No deformity or signs of injury.      Cervical back: Normal range of motion.   Skin:     General: Skin is warm.      Findings: No bruising.   Neurological:      Mental Status: He is lethargic.      Cranial Nerves: Dysarthria and facial asymmetry present.      Motor: Weakness present.    Psychiatric:         Speech: He is noncommunicative (severe aphasia).         Behavior: Behavior is cooperative.              Neurological Exam:   LOC: drowsy  Attention Span: poor  Language: Global aphasia  Articulation: Untestable due to severe aphasia   Orientation: Untestable due to severe aphasia   Visual Fields: Full  EOM (CN III, IV, VI): Gaze preference  left  Facial Movement (CN VII): Lower facial weakness on the Right  Motor: Arm left  Paresis: 4/5  Leg left  Paresis: 3/5  Arm right  Plegia 0/5  Leg right Plegia 0/5  Sensation: Anatoliy-hypoesthesia right    Laboratory:  CMP:   Recent Labs   Lab 01/29/24 0014   CALCIUM 9.2   ALBUMIN 3.5   PROT 6.8   *   K 4.3   CO2 22*      BUN 35*   CREATININE 1.4   ALKPHOS 87   ALT 14   AST 16   BILITOT 0.7       CBC:   Recent Labs   Lab 01/29/24 0014   WBC 8.32   RBC 4.04*   HGB 12.0*   HCT 36.9*      MCV 91   MCH 29.7   MCHC 32.5       Lipid Panel:   Recent Labs   Lab 01/27/24 2011   CHOL 220*   LDLCALC 155.6   HDL 39*   TRIG 127       Coagulation:   Recent Labs   Lab 01/29/24 0014   INR 1.0   APTT 24.8       Hgb A1C:   Recent Labs   Lab 01/28/24  0330   HGBA1C 8.2*       TSH:   Recent Labs   Lab 01/27/24 2011   TSH 1.875         Diagnostic Results     Brain Imaging:  [facility] 1/27/2024 @ OSH  Radiology report not available. No acute ischemia with favorable ASPECTS.  Hyperdense sign in the left distal M1.         Vessel Imaging:  IR cerebral angiogram 1/27/2024  Preliminary interpretation: > 75% stenosis of the L ICA, treated to 0% w angioplasty and stenting; L MCA occlusion treated to TICI 3 w aspiration.  Please see Imaging report for full details.     CTA head/neck 1/27/2024 @ OSH  Impression:  1. There is acute clot in the left distal M1 and proximal superior branch M2 MCA with high-grade stenosis/LVO.  Moderate referral collaterals  2. Chronic LVO of the distal right vertebral artery.  High-grade stenosis of the proximal left vertebral artery  greater than 70%  3. High-grade stenosis right carotid bifurcation 75%.  Moderate grade stenosis on the left 60%      Cardiac Imaging   TTE pending    Renay Colón DNP  [facility]  Department of Vascular Neurology   [facility]

## 2024-01-30 NOTE — PROGRESS NOTES
[facility] Critical [facility]  Vascular Neurology  Comprehensive Stroke Center  Progress Note    Assessment/Plan:     * Embolic stroke involving left middle cerebral artery  Pierre Jama is a 89 y.o. male with PMH of HTN, HLD, Afib (on xarelto) that was transferred to [facility] with acute L MCA syndrome for intervention of L M1 occlusion. Patient admitted to OSH 1/25 for PNA, hypoxic respiratory failure, and sepsis. On 1/27 found to have new RSW, aphasia, L gaze. LKW midnight 1/27, OOW for TNK. Telestroke NIHSS 26. CTA with L M1 occlusion, CTH with good ASPECTS. On arrival to [facility], neuro exam stable with NIHSS 24. Patient taken immediately to IR, now s/p L M1 thrombectomy with TICI 3 reperfusion as well as L ICA angioplasty and stenting.     Neuro exam stable. Continue to be on cardene drip. NGT in place. SLP recommend NPO.       Antithrombotics for secondary stroke prevention: Antiplatelets: Aspirin: 81 mg daily  Clopidogrel: 75 mg daily for DAPT due to stent. NGT in place now.     Statins for secondary stroke prevention and hyperlipidemia, if present: Statins: Atorvastatin- 40 mg daily    Aggressive risk factor modification: HTN, HLD, Diet, Exercise, Obesity, A-Fib     Rehab efforts: The patient has been evaluated by a stroke team provider and the therapy needs have been fully considered based off the presenting complaints and exam findings. The following therapy evaluations are needed: PT evaluate and treat, OT evaluate and treat, SLP evaluate and treat    Diagnostics ordered/pending: CT scan of head without contrast to asses brain parenchyma, MRI head without contrast to assess brain parenchyma, TTE to assess cardiac function/status     VTE prophylaxis: Heparin 5000 units SQ every 8 hours  Mechanical prophylaxis: Place SCDs    BP parameters: Infarct: Post sucessful thrombectomy, SBP <140        Diabetes mellitus  Stroke risk factor   AIC 8.2%  BG goal of 140-180 while inpatient   SSI    Right hemiplegia  Due to  stroke   PT/OT to evaluate and treat     Aphasia  Due to stroke  SLP to evaluate and treat     Neurologic deficit due to acute ischemic stroke  Aphasia   Right hemiplegia   -Due to stroke  -Aggressive therapy  -PT/OT/SLP eval and treat    HLD (hyperlipidemia)  -Stroke risk factor  -  -Atorvastatin 40mg daily for LDL goal <70    HTN (hypertension)  -Stroke risk factor  -SBP <140 s/p thrombectomy, maintain MAP >65  -PRN labetalol/hydralazine    A-fib  -Stroke risk factor  -On xarelto, reportedly stopped at OSH due to c/f anemia  -Will need to further discuss timing of when to resume AC for afib         01/28/2024 Continues to have significant L MCA deficits s/p thrombectomy. Overnight had episode of tachycardia in 130s, EKG with wide complex QRS likely ST. Improved with metoprolol. Continues to have intermittent episode of tachycardia per day RN. Unable to get PO access overnight due to tube coiling in stomach. Currently on ASA MS only, will start plavix for DAPT once PO route established. Repeat CTH, echo, therapy evals pending.  01/29/2024 Continue to have L MCA syndrome s/p thrombectomy. Tachycardia with wide complex QRS continue to be an issue. Pacer in place. Awaiting echo  NGT in place; DAPT started. SLP pending.   01/30/2024 Neuro exam stable. Continue to be on cardene drip. NGT in place. SLP recommend NPO.     STROKE DOCUMENTATION   Acute Stroke Times   Last Known Normal Date: 01/27/24  Last Known Normal Time: 0000  Stroke Team Called Date: 01/27/24  Stroke Team Called Time: 1511  Stroke Team Arrival Date: 01/27/24  Stroke Team Arrival Time: 1511  CT Interpretation Time: 1210 (OSH imaging reviewed PTA)  Thrombolytic Therapy Recommended: No  CTA Interpretation Time: 1224 (OSH imaging reviewed PTA)  Thrombectomy Recommended: Yes  Decision to Treat Time for IR: 1511    NIH Scale:  1a. Level of Consciousness: 1-->Not alert, but arousable by minor stimulation to obey, answer, or respond  1b. LOC Questions:  2-->Answers neither question correctly  1c. LOC Commands: 2-->Performs neither task correctly  2. Best Gaze: 1-->Partial gaze palsy, gaze is abnormal in one or both eyes, but forced deviation or total gaze paresis is not present  3. Visual: 0-->No visual loss  4. Facial Palsy: 2-->Partial paralysis (total or near-total paralysis of lower face)  5a. Motor Arm, Left: 2-->Some effort against gravity, limb cannot get to or maintain (if cued) 90 (or 45) degrees, drifts down to bed, but has some effort against gravity  5b. Motor Arm, Right: 3-->No effort against gravity, limb falls  6a. Motor Leg, Left: 2-->Some effort against gravity, leg falls to bed by 5 secs, but has some effort against gravity  6b. Motor Leg, Right: 3-->No effort against gravity, leg falls to bed immediately  7. Limb Ataxia: 0-->Absent  8. Sensory: 0-->Normal, no sensory loss  9. Best Language: 3-->Mute, global aphasia, no usable speech or auditory comprehension  10. Dysarthria: 2-->Severe dysarthria, patients speech is so slurred as to be unintelligible in the absence of or out of proportion to any dysphasia, or is mute/anarthric  11. Extinction and Inattention (formerly Neglect): 0-->No abnormality  Total (NIH Stroke Scale): 23       Modified Emma Score: 0  [place] Coma Scale:    ABCD2 Score:    [id number] Score:5  HAS -BLED Score:   ICH Score:   Hunt & Chandler Classification:      Hemorrhagic change of an Ischemic Stroke: Does this patient have an ischemic stroke with hemorrhagic changes? No     Neurologic Chief Complaint: L MCA syndrome + L M1 occlusion    Subjective:     Interval History: Patient is seen for follow-up neurological assessment and treatment recommendations: Neuro exam stable. Continue to be on cardene drip. NGT in place. SLP recommend NPO.     HPI, Past Medical, Family, and Social History remains the same as documented in the initial encounter.     Review of Systems   Unable to perform ROS: Other (severe aphasia)     Scheduled  Meds:   aspirin  81 mg Per NG tube Daily    atorvastatin  40 mg Per NG tube Daily    clopidogreL  75 mg Per NG tube Daily    heparin (porcine)  5,000 Units Subcutaneous Q8H    hydrALAZINE  25 mg Per NG tube Q8H    metoprolol tartrate  25 mg Per NG tube QID    mupirocin   Nasal BID    piperacillin-tazobactam (Zosyn) IV (PEDS and ADULTS) (extended infusion is not appropriate)  4.5 g Intravenous Q8H    [START ON 1/31/2024] vancomycin (VANCOCIN) IV (PEDS and ADULTS)  1,000 mg Intravenous Q24H     Continuous Infusions:   nicardipine 2.5 mg/hr (01/30/24 1600)     PRN Meds:acetaminophen, bisacodyL, dextrose 10%, dextrose 10%, glucagon (human recombinant), hydrALAZINE, insulin aspart U-100, labetalol, magnesium oxide, magnesium oxide, ondansetron, oxymetazoline, potassium bicarbonate, potassium bicarbonate, potassium bicarbonate, potassium, sodium phosphates, potassium, sodium phosphates, potassium, sodium phosphates, Pharmacy to dose Vancomycin consult **AND** vancomycin - pharmacy to dose    Objective:     Vital Signs (Most Recent):  Temp: 98.7 °F (37.1 °C) (01/30/24 1500)  Pulse: 80 (01/30/24 1600)  Resp: (!) 22 (01/30/24 1600)  BP: (!) 158/70 (01/30/24 1600)  SpO2: 98 % (01/30/24 1600)  BP Location: Left arm    Vital Signs Range (Last 24H):  Temp:  [98.7 °F (37.1 °C)-100.8 °F (38.2 °C)]   Pulse:  []   Resp:  [10-33]   BP: (110-158)/(53-84)   SpO2:  [91 %-99 %]   BP Location: Left arm       Physical Exam  Vitals and nursing note reviewed.   Constitutional:       Appearance: He is not toxic-appearing.   HENT:      Head: Normocephalic.      Nose: Nose normal.      Mouth/Throat:      Mouth: Mucous membranes are moist.   Eyes:      General: No scleral icterus.     Conjunctiva/sclera: Conjunctivae normal.   Cardiovascular:      Rate and Rhythm: Normal rate.   Pulmonary:      Effort: Pulmonary effort is normal. No respiratory distress.   Abdominal:      General: There is no distension.   Musculoskeletal:         General:  No deformity or signs of injury.      Cervical back: Normal range of motion.   Skin:     General: Skin is warm.      Findings: No bruising.   Neurological:      Mental Status: He is lethargic.      Cranial Nerves: Dysarthria and facial asymmetry present.      Motor: Weakness present.   Psychiatric:         Speech: He is noncommunicative (severe aphasia).         Behavior: Behavior is cooperative.              Neurological Exam:   LOC: drowsy  Attention Span: poor  Language: Global aphasia  Articulation: Untestable due to severe aphasia   Orientation: Untestable due to severe aphasia   Visual Fields: Full  EOM (CN III, IV, VI): Gaze preference  left  Facial Movement (CN VII): Lower facial weakness on the Right  Motor: Arm left  Paresis: 2/5  Leg left  Paresis: 2/5  Arm right  Plegia 3/5  Leg right Plegia 3/5  Sensation: Anatoliy-hypoesthesia right    Laboratory:  CMP:   Recent Labs   Lab 01/30/24  0112   CALCIUM 9.2   ALBUMIN 3.5   PROT 6.8      K 4.0   CO2 20*      BUN 32*   CREATININE 1.1   ALKPHOS 87   ALT 21   AST 30   BILITOT 1.0       CBC:   Recent Labs   Lab 01/30/24  0112   WBC 8.01   RBC 3.85*   HGB 11.5*   HCT 35.1*      MCV 91   MCH 29.9   MCHC 32.8       Lipid Panel:   Recent Labs   Lab 01/27/24 2011   CHOL 220*   LDLCALC 155.6   HDL 39*   TRIG 127       Coagulation:   Recent Labs   Lab 01/29/24  0014   INR 1.0   APTT 24.8       Hgb A1C:   Recent Labs   Lab 01/28/24  0330   HGBA1C 8.2*       TSH:   Recent Labs   Lab 01/27/24 2011   TSH 1.875         Diagnostic Results     Brain Imaging:    [facility] 1/29/2024   Impression:     Evolving left MCA distribution infarct.  No evidence of hemorrhagic transformation.     Additional multifocal ischemic changes elsewhere in the brain, likely chronic or subacute.  Comparison with prior images, should they become available, could be helpful in further characterizing these findings.    [facility] 1/27/2024 @ OSH  Radiology report not available. No acute ischemia  with favorable ASPECTS.  Hyperdense sign in the left distal M1.         Vessel Imaging:  IR cerebral angiogram 1/27/2024  Preliminary interpretation: > 75% stenosis of the L ICA, treated to 0% w angioplasty and stenting; L MCA occlusion treated to TICI 3 w aspiration.  Please see Imaging report for full details.     CTA head/neck 1/27/2024 @ OSH  Impression:  1. There is acute clot in the left distal M1 and proximal superior branch M2 MCA with high-grade stenosis/LVO.  Moderate referral collaterals  2. Chronic LVO of the distal right vertebral artery.  High-grade stenosis of the proximal left vertebral artery greater than 70%  3. High-grade stenosis right carotid bifurcation 75%.  Moderate grade stenosis on the left 60%      Cardiac Imaging   TTE 01/29/24    Summary         Left Ventricle: The left ventricle is normal in size. Ventricular mass is normal. Normal wall thickness. There is concentric remodeling. Normal wall motion. There is mildly reduced systolic function with a visually estimated ejection fraction of 40 - 45%. Ejection fraction by visual approximation is 43%. The assessment was effected by the 140 heart rates . There is normal diastolic function.    Right Ventricle: Normal right ventricular cavity size. Wall thickness is normal. Right ventricle wall motion  is normal. Systolic function is normal. Pacemaker lead present in the ventricle.    Left Atrium: Left atrium is severely dilated.    Right Atrium: Right atrium is mildly dilated. Lead present in the right atrium.    Aortic Valve: The aortic valve is a trileaflet valve. There is mild aortic valve sclerosis. There is mild annular calcification present.    Mitral Valve: There is bileaflet sclerosis. There is mild regurgitation.    Tricuspid Valve: There is mild regurgitation.    Pulmonary Artery: The estimated pulmonary artery systolic pressure is 35 mmHg.    IVC/SVC: Intermediate venous pressure at 8 mmHg.    Renay Colón  [facility]  Comprehensive Stroke Center  Department of Vascular Neurology   [facility]

## 2024-01-30 NOTE — SUBJECTIVE & OBJECTIVE
Neurologic Chief Complaint: L MCA syndrome + L M1 occlusion    Subjective:     Interval History: Patient is seen for follow-up neurological assessment and treatment recommendations: Neuro exam stable. Continue to be on cardene drip. NGT in place. SLP recommend NPO.     HPI, Past Medical, Family, and Social History remains the same as documented in the initial encounter.     Review of Systems   Unable to perform ROS: Other (severe aphasia)     Scheduled Meds:   aspirin  81 mg Per NG tube Daily    atorvastatin  40 mg Per NG tube Daily    clopidogreL  75 mg Per NG tube Daily    heparin (porcine)  5,000 Units Subcutaneous Q8H    hydrALAZINE  25 mg Per NG tube Q8H    metoprolol tartrate  25 mg Per NG tube QID    mupirocin   Nasal BID    piperacillin-tazobactam (Zosyn) IV (PEDS and ADULTS) (extended infusion is not appropriate)  4.5 g Intravenous Q8H    [START ON 1/31/2024] vancomycin (VANCOCIN) IV (PEDS and ADULTS)  1,000 mg Intravenous Q24H     Continuous Infusions:   nicardipine 2.5 mg/hr (01/30/24 1600)     PRN Meds:acetaminophen, bisacodyL, dextrose 10%, dextrose 10%, glucagon (human recombinant), hydrALAZINE, insulin aspart U-100, labetalol, magnesium oxide, magnesium oxide, ondansetron, oxymetazoline, potassium bicarbonate, potassium bicarbonate, potassium bicarbonate, potassium, sodium phosphates, potassium, sodium phosphates, potassium, sodium phosphates, Pharmacy to dose Vancomycin consult **AND** vancomycin - pharmacy to dose    Objective:     Vital Signs (Most Recent):  Temp: 98.7 °F (37.1 °C) (01/30/24 1500)  Pulse: 80 (01/30/24 1600)  Resp: (!) 22 (01/30/24 1600)  BP: (!) 158/70 (01/30/24 1600)  SpO2: 98 % (01/30/24 1600)  BP Location: Left arm    Vital Signs Range (Last 24H):  Temp:  [98.7 °F (37.1 °C)-100.8 °F (38.2 °C)]   Pulse:  []   Resp:  [10-33]   BP: (110-158)/(53-84)   SpO2:  [91 %-99 %]   BP Location: Left arm       Physical Exam  Vitals and nursing note reviewed.   Constitutional:        Appearance: He is not toxic-appearing.   HENT:      Head: Normocephalic.      Nose: Nose normal.      Mouth/Throat:      Mouth: Mucous membranes are moist.   Eyes:      General: No scleral icterus.     Conjunctiva/sclera: Conjunctivae normal.   Cardiovascular:      Rate and Rhythm: Normal rate.   Pulmonary:      Effort: Pulmonary effort is normal. No respiratory distress.   Abdominal:      General: There is no distension.   Musculoskeletal:         General: No deformity or signs of injury.      Cervical back: Normal range of motion.   Skin:     General: Skin is warm.      Findings: No bruising.   Neurological:      Mental Status: He is lethargic.      Cranial Nerves: Dysarthria and facial asymmetry present.      Motor: Weakness present.   Psychiatric:         Speech: He is noncommunicative (severe aphasia).         Behavior: Behavior is cooperative.              Neurological Exam:   LOC: drowsy  Attention Span: poor  Language: Global aphasia  Articulation: Untestable due to severe aphasia   Orientation: Untestable due to severe aphasia   Visual Fields: Full  EOM (CN III, IV, VI): Gaze preference  left  Facial Movement (CN VII): Lower facial weakness on the Right  Motor: Arm left  Paresis: 2/5  Leg left  Paresis: 2/5  Arm right  Plegia 3/5  Leg right Plegia 3/5  Sensation: Anatoliy-hypoesthesia right    Laboratory:  CMP:   Recent Labs   Lab 01/30/24  0112   CALCIUM 9.2   ALBUMIN 3.5   PROT 6.8      K 4.0   CO2 20*      BUN 32*   CREATININE 1.1   ALKPHOS 87   ALT 21   AST 30   BILITOT 1.0       CBC:   Recent Labs   Lab 01/30/24  0112   WBC 8.01   RBC 3.85*   HGB 11.5*   HCT 35.1*      MCV 91   MCH 29.9   MCHC 32.8       Lipid Panel:   Recent Labs   Lab 01/27/24 2011   CHOL 220*   LDLCALC 155.6   HDL 39*   TRIG 127       Coagulation:   Recent Labs   Lab 01/29/24  0014   INR 1.0   APTT 24.8       Hgb A1C:   Recent Labs   Lab 01/28/24  0330   HGBA1C 8.2*       TSH:   Recent Labs   Lab 01/27/24 2011   TSH  1.875         Diagnostic Results     Brain Imaging:    [facility] 1/29/2024   Impression:     Evolving left MCA distribution infarct.  No evidence of hemorrhagic transformation.     Additional multifocal ischemic changes elsewhere in the brain, likely chronic or subacute.  Comparison with prior images, should they become available, could be helpful in further characterizing these findings.    [facility] 1/27/2024 @ OSH  Radiology report not available. No acute ischemia with favorable ASPECTS.  Hyperdense sign in the left distal M1.         Vessel Imaging:  IR cerebral angiogram 1/27/2024  Preliminary interpretation: > 75% stenosis of the L ICA, treated to 0% w angioplasty and stenting; L MCA occlusion treated to TICI 3 w aspiration.  Please see Imaging report for full details.     CTA head/neck 1/27/2024 @ OSH  Impression:  1. There is acute clot in the left distal M1 and proximal superior branch M2 MCA with high-grade stenosis/LVO.  Moderate referral collaterals  2. Chronic LVO of the distal right vertebral artery.  High-grade stenosis of the proximal left vertebral artery greater than 70%  3. High-grade stenosis right carotid bifurcation 75%.  Moderate grade stenosis on the left 60%      Cardiac Imaging   TTE 01/29/24    Summary         Left Ventricle: The left ventricle is normal in size. Ventricular mass is normal. Normal wall thickness. There is concentric remodeling. Normal wall motion. There is mildly reduced systolic function with a visually estimated ejection fraction of 40 - 45%. Ejection fraction by visual approximation is 43%. The assessment was effected by the 140 heart rates . There is normal diastolic function.    Right Ventricle: Normal right ventricular cavity size. Wall thickness is normal. Right ventricle wall motion  is normal. Systolic function is normal. Pacemaker lead present in the ventricle.    Left Atrium: Left atrium is severely dilated.    Right Atrium: Right atrium is mildly dilated. Lead present in  the right atrium.    Aortic Valve: The aortic valve is a trileaflet valve. There is mild aortic valve sclerosis. There is mild annular calcification present.    Mitral Valve: There is bileaflet sclerosis. There is mild regurgitation.    Tricuspid Valve: There is mild regurgitation.    Pulmonary Artery: The estimated pulmonary artery systolic pressure is 35 mmHg.    IVC/SVC: Intermediate venous pressure at 8 mmHg.

## 2024-01-31 PROBLEM — Z51.5 PALLIATIVE CARE ENCOUNTER: Status: ACTIVE | Noted: 2024-01-01

## 2024-01-31 PROBLEM — Z71.89 ACP (ADVANCE CARE PLANNING): Status: ACTIVE | Noted: 2024-01-01

## 2024-01-31 NOTE — CONSULTS
"[facility]  Palliative Medicine  Consult Note    Patient Name: Pierre Jama  MRN: [id number]  Admission Date: 1/27/2024  Hospital Length of Stay: 4 days  Code Status: Partial Code   Attending Provider: Marco Candelaria MD  Consulting Provider: PASHA Wood  Primary Care Physician: Estrada Ham MD  Principal Problem:Embolic stroke involving left middle cerebral artery    Patient information was obtained from spouse/SO and primary team.      Inpatient consult to Palliative Care  Consult performed by: Teri Paredes, CNS  Consult ordered by: Teresa Marmolejo NP        Assessment/Plan:     Palliative Care  Palliative care encounter  Impression Pierre Jama is a 89 y.o. male with Hx of HTN, HLD, Afib (on xarelto) that was transferred to [facility] with acute L MCA syndrome for intervention of L M1 occlusion. Patient admitted to OSH 1/25 for PNA, hypoxic respiratory failure, and sepsis. On 1/27 found to have new RSW, aphasia, L gaze. LKW midnight 1/27, OOW for TNK. On arrival to [facility], taken immediately to IR, now s/p L M1 thrombectomy with TICI 3 reperfusion as well as L ICA angioplasty and stenting. Pt has been nonverbal and without following commands since that time. Today, he is on room air and moaning intermittently upon my visit.     Reason for Consult Per communication with I. Jaleel ,JENNIFER pall med consulted for GOC/ACP.    ACP/GOC: I met with pt's wife (Anitra) and her son today at bs. Anitra seems to have a general understanding of his prognosis and states he "won't make it very long". We discussed the difference between his actual stroke and all of the repercussions and comorbidity that goes along with it. She shared with me how independent and active he was up until this hospitalization and stroke. She knows he would not "want to live like this" and is opposed to the idea of artifical life support. We discussed what a PEG tube is pros/cons, and she states that he "would not want " "that". She shared that it is important for him to be comfortable and that "as a proud man" he would not want to have a life like this. We discussed care and goals associated with hospice and she is interested in moving towards this. She will speak with is children/daughter this evening, and I will revisit in the morning. If pt is transitioned to hospice, she would like it to be at a NH closer to their home in [place], [place].     MPOA : Wife Anitra Jama ([phone])     Symptoms     Recommendations   -Primary MD to change pt to full DNR, as wife would not want a breathing tube for pt.   - Will f/u with wife tomorrow after she speak with his children.  -Will likely need CM assistance w/NH and hospice in/near [place], [place].         Thank you for your consult. I will follow-up with patient. Please contact us if you have any additional questions.    Subjective:     HPI:   Per Chart Review: "Patient is an 89-year-old male with history of atrial fibrillation (Xarelto), HTN, HLD who presents a transfer from an outside hospital for an acute stroke needing thrombectomy. Patient admitted to OSH 1/25 for PNA, hypoxic respiratory failure, and sepsis. On 1/27 found to have new RSW, aphasia, L gaze. LKW midnight 1/27, OOW for TNK. Telestroke NIHSS 26. CTA with L M1 occlusion, CTH with good ASPECTS. [place] [facility] exam stable with NIHSS 24. Patient taken immediately to IR, now s/p L M1 thrombectomy with TICI 3 reperfusion as well as L ICA angioplasty and stenting.  Last known normal at 1159 pm.  Imaging showed a large vessel occlusion so patient was transferred here for thrombectomy.  No TNK outside of window. Patient s/p thrombectomy TiCi3 with stents in left ICA. Admit to [facility] for neuro monitoring and higher level of care.      Hospital Course: 01/28/2024 Unable to place NG/Keotube, ASA load given DE but unable to administer Plavix. Discussed w/ Vascular Neurology, ok for ASA alone for now. GI consulted for assistance w/ " "enteral access, plan to see patent tomorrow. Metoprolol x 2 , 500cc NS, and 2g mag given for tachycardia. Discussion w/ wife, aravind @ bedside and daughter on phone, code status changed to DNR.  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, pending speech eval  01/30/2024: new temp, ronchi evident, lactate not elevated but devloping SIRS alteration in VS and encephalopathy, pan cultured including NT specimen and started on vanc/zosyn, begin trickle feeds, presently not hypotensive"    Hospital Course:  No notes on file      Past Medical History:   Diagnosis Date    A-fib 01/27/2024    Diabetes mellitus     GERD (gastroesophageal reflux disease)     HLD (hyperlipidemia) 01/27/2024    HTN (hypertension) 01/27/2024       Past Surgical History:   Procedure Laterality Date    ESOPHAGOGASTRODUODENOSCOPY N/A 1/29/2024    Procedure: EGD (ESOPHAGOGASTRODUODENOSCOPY);  Surgeon: Kurt Munoz MD;  Location: [address]);  Service: Endoscopy;  Laterality: N/A;    REPLACEMENT OF DUAL CHAMBER PACEMAKER GENERATOR  02/25/2020       Review of patient's allergies indicates:  Not on File    Medications:  Continuous Infusions:   diltiaZEM (CARDIZEM) 125 mg in dextrose 5 % (D5W) 125 mL infusion       Scheduled Meds:   aspirin  81 mg Per NG tube Daily    atorvastatin  40 mg Per NG tube Daily    clopidogreL  75 mg Per NG tube Daily    fentaNYL  12.5 mcg Intravenous Once    heparin (porcine)  5,000 Units " Subcutaneous Q8H    hydrALAZINE  25 mg Per NG tube Q8H    metoprolol tartrate  50 mg Per NG tube QID    mupirocin   Nasal BID    piperacillin-tazobactam (Zosyn) IV (PEDS and ADULTS) (extended infusion is not appropriate)  4.5 g Intravenous Q8H    sodium chloride 3%  4 mL Nebulization Q6H    vancomycin (VANCOCIN) IV (PEDS and ADULTS)  1,000 mg Intravenous Q24H     PRN Meds:acetaminophen, bisacodyL, dextrose 10%, dextrose 10%, glucagon (human recombinant), hydrALAZINE, insulin aspart U-100, labetalol, magnesium oxide, magnesium oxide, ondansetron, potassium bicarbonate, potassium bicarbonate, potassium bicarbonate, potassium, sodium phosphates, potassium, sodium phosphates, potassium, sodium phosphates, Pharmacy to dose Vancomycin consult **AND** vancomycin - pharmacy to dose    Family History    None       Tobacco Use    Smoking status: Former     Types: Cigarettes    Smokeless tobacco: Not on file   Substance and Sexual Activity    Alcohol use: Not on file    Drug use: Never    Sexual activity: Not on file       Review of Systems   Unable to perform ROS: Acuity of condition     Objective:     Vital Signs (Most Recent):  Temp: 98.8 °F (37.1 °C) (01/31/24 0700)  Pulse: 70 (01/31/24 1330)  Resp: (!) 24 (01/31/24 1330)  BP: 130/61 (01/31/24 1330)  SpO2: 95 % (01/31/24 1330) Vital Signs (24h Range):  Temp:  [98.2 °F (36.8 °C)-99.6 °F (37.6 °C)] 98.8 °F (37.1 °C)  Pulse:  [] 70  Resp:  [14-59] 24  SpO2:  [94 %-100 %] 95 %  BP: (103-162)/(53-81) 130/61     Weight: 78.5 kg (173 lb 1 oz)  Body mass index is 24.83 kg/m².       Physical Exam  Constitutional:       Appearance: He is ill-appearing.   Neurological:      Mental Status: He is lethargic.            Review of Symptoms      Symptom Assessment (ESAS 0-10 Scale)  Pain:  0  Dyspnea:  0  Anxiety:  0  Nausea:  0  Depression:  0  Anorexia:  0  Fatigue:  0  Insomnia:  0  Restlessness:  0  Agitation:  0  Unable to complete assessment due to Acuity of condition          Pain Assessment in Advanced Demential Scale (PAINAD)   Breathing - Independent of vocalization:  0  Negative vocalization:  1  Facial expression:  1  Body language:  1  Consolability:  0  Total:  3    Performance Status:  10    Living Arrangements:  Lives with spouse    Psychosocial/Cultural:   See Palliative Psychosocial Note: Yes  Pt and wife live alone at their home. Pt was fully functional until this CVA.   **Primary  to Follow**  Palliative Care  Consult: Yes        Advance Care Planning  Advance Directives:   Do Not Resuscitate: Partial (only intubate) as of now. Will readdress..      Decision Making:  Family answered questions  Goals of Care: The family endorses that what is most important right now is to focus on comfort and QOL     Accordingly, we have decided that the best plan to meet the patient's goals includes continuing with treatment and likely transition to hospice care.          Significant Labs: BMP:   Recent Labs   Lab 01/31/24  0115 01/31/24  1234   *  --      --    K 3.5 4.2     --    CO2 23  --    BUN 31*  --    CREATININE 1.2  --    CALCIUM 9.4  --    MG 2.2  --      CBC:   Recent Labs   Lab 01/30/24  0112 01/31/24  0115   WBC 8.01 8.36   HGB 11.5* 11.2*   HCT 35.1* 34.7*    180     CBC:   Recent Labs   Lab 01/31/24  0115   WBC 8.36   HGB 11.2*   HCT 34.7*   MCV 92        BMP:  Recent Labs   Lab 01/31/24  0115 01/31/24  1234   *  --      --    K 3.5 4.2     --    CO2 23  --    BUN 31*  --    CREATININE 1.2  --    CALCIUM 9.4  --    MG 2.2  --      LFT:  Lab Results   Component Value Date    AST 25 01/31/2024    ALKPHOS 84 01/31/2024    BILITOT 1.1 (H) 01/31/2024     Albumin:   Albumin   Date Value Ref Range Status   01/31/2024 3.3 (L) 3.5 - 5.2 g/dL Final     Protein:   Total Protein   Date Value Ref Range Status   01/31/2024 6.5 6.0 - 8.4 g/dL Final     Lactic acid:   Lab Results   Component Value Date     LACTATE 1.1 01/30/2024       Significant Imaging: I have reviewed all pertinent imaging results/findings within the past 24 hours.      I spent a total of 75 minutes on the day of the visit. This includes face to face time in discussion of goals of care, symptom assessment, coordination of care and emotional support.  This also includes non-face to face time preparing to see the patient (eg, review of tests/imaging), obtaining and/or reviewing separately obtained history, documenting clinical information in the electronic or other health record, independently interpreting results and communicating results to the patient/family/caregiver, or care coordinator.    Teri Paredes, CNS  Palliative Medicine  [facility]

## 2024-01-31 NOTE — ASSESSMENT & PLAN NOTE
-SBP goal <  140  -PRN labetalol/hydralazine  01/30: hold bp lowering agents while in process of developing SIRS/sepsis

## 2024-01-31 NOTE — PROGRESS NOTES
[facility]  Neurocritical Care  Progress Note    Admit Date: 1/27/2024  Service Date: 01/31/2024  Length of Stay: 4    Subjective:     Chief Complaint: Embolic stroke involving left middle cerebral artery    History of Present Illness: Patient is an 89-year-old male with history of atrial fibrillation on anticoagulation, hypertension who presents a transfer from an outside hospital for an acute stroke needing thrombectomy.  Last known normal at 1159 pm.  He initially presented with right-sided weakness, right-sided facial droop and aphasia.  Imaging showed a large vessel occlusion so patient was transferred here for thrombectomy.  No TNK outside of window. Patient s/p thrombectomy TiCi3 with stents in left ICA. Admit to [facility] for neuro monitoring and higher level of care.     Hospital Course: 01/28/2024 Unable to place NG/Keotube, ASA load given NC but unable to administer Plavix. Discussed w/ Vascular Neurology, ok for ASA alone for now. GI consulted for assistance w/ enteral access, plan to see patent tomorrow. Metoprolol x 2 , 500cc NS, and 2g mag given for tachycardia. Discussion w/ wife, aravind @ bedside and daughter on phone, code status changed to DNR.  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, pending speech eval  01/30/2024: new temp, ronchi evident, lactate not elevated but devloping SIRS alteration in VS and encephalopathy, pan cultured including NT specimen and  started on vanc/zosyn, begin trickle feeds, presently not hypotensive  01/31/2024: no change in neuro status, from my discussion with wife yesterday, it appears that end of life discussions along with determining goals of care in settings of significant disability were not performed, consult palliative, RVR more of a pronlem but not occurring with significant hypotension, dilt iv bolus appears to have worked-will need to be repeated when dilt gtt arrives    Interval History:      Review of Systems   Unable to perform ROS: Patient nonverbal       Objective:     Vitals:  Temp: 98.8 °F (37.1 °C)  Pulse: 70  Rhythm: ventricular rhythm, sinus tachycardia  BP: 130/61  MAP (mmHg): 88  Resp: (!) 24  SpO2: 95 %    Temp  Min: 98.2 °F (36.8 °C)  Max: 99.6 °F (37.6 °C)  Pulse  Min: 69  Max: 155  BP  Min: 103/55  Max: 162/81  MAP (mmHg)  Min: 73  Max: 116  Resp  Min: 14  Max: 59  SpO2  Min: 94 %  Max: 100 %    01/30 0701 - 01/31 0700  In: 1093.6 [I.V.:443.1]  Out: 230 [Urine:230]   Unmeasured Output  Urine Occurrence: 150  Stool Occurrence: 1  Pad Count: 1        Physical Exam  Constitutional:       Comments: obtunded   Eyes:      Pupils: Pupils are equal, round, and reactive to light.      Comments: Left gaze deviation   Cardiovascular:      Rate and Rhythm: Tachycardia present.   Pulmonary:      Breath sounds: No rhonchi.   Abdominal:      Palpations: Abdomen is soft.   Musculoskeletal:         General: No swelling.      Cervical back: Neck supple.   Skin:     General: Skin is warm.   Neurological:      Comments: Dense right hemiplegia              Medications:  ContinuousdiltiaZEM (CARDIZEM) 125 mg in dextrose 5 % (D5W) 125 mL infusion    Scheduledaspirin, 81 mg, Daily  atorvastatin, 40 mg, Daily  clopidogreL, 75 mg, Daily  fentaNYL, 12.5 mcg, Once  heparin (porcine), 5,000 Units, Q8H  hydrALAZINE, 25 mg, Q8H  metoprolol tartrate, 50 mg, QID  mupirocin, , BID  piperacillin-tazobactam (Zosyn) IV (PEDS and ADULTS) (extended  infusion is not appropriate), 4.5 g, Q8H  sodium chloride 3%, 4 mL, Q6H  vancomycin (VANCOCIN) IV (PEDS and ADULTS), 1,000 mg, Q24H    PRNacetaminophen, 650 mg, Q6H PRN  bisacodyL, 10 mg, Daily PRN  dextrose 10%, 12.5 g, PRN  dextrose 10%, 25 g, PRN  glucagon (human recombinant), 1 mg, PRN  hydrALAZINE, 10 mg, Q4H PRN  insulin aspart U-100, 0-10 Units, Q6H PRN  labetalol, 10 mg, Q4H PRN  magnesium oxide, 800 mg, PRN  magnesium oxide, 800 mg, PRN  ondansetron, 4 mg, Q8H PRN  potassium bicarbonate, 35 mEq, PRN  potassium bicarbonate, 50 mEq, PRN  potassium bicarbonate, 60 mEq, PRN  potassium, sodium phosphates, 2 packet, PRN  potassium, sodium phosphates, 2 packet, PRN  potassium, sodium phosphates, 2 packet, PRN  vancomycin - pharmacy to dose, , pharmacy to manage frequency      Today I personally reviewed pertinent medications, lines/drains/airways, cardiology results, laboratory results, microbiology results, notably:    Diet  Diet NPO  Diet NPO        Assessment/Plan:     Cardiac/Vascular  HTN (hypertension)  -SBP goal <  140  -PRN labetalol/hydralazine  01/30: hold bp lowering agents while in process of developing SIRS/sepsis  01/31: dilt added mostly due to refractory RVR    A-fib  -Hx of  -NSR w/ episodes of wide QRS tachycardia  -Metoprolol x2, 500cc NS bolus, 2g mag  -Hold home Xarelto  01/30: HR adequate on metoprolol  01/31: persistent tachycardia up to 150-160 despite beta blocker increase, add dilt    Other  Debility  Level of appreciation of the severity of this is low  Palliative care consulted          The patient is being Prophylaxed for:  Venous Thromboembolism with: Chemical  Stress Ulcer with: None  Ventilator Pneumonia with: not applicable    Activity Orders            Elevate HOB Elevate (30-45 degrees) Elevate HOB to 30 - 45 degrees during feeding unless otherwise stated starting at 01/30 1108    Progressive Mobility Protocol (mobilize patient to their highest level of functioning at least twice  daily) starting at 01/27 2000    Turn patient starting at 01/27 1600    Elevate HOB starting at 01/27 1540    Diet NPO: NPO starting at 01/27 1540        CRC 33 min  Partial Code    Marco Barrios MD  Neurocritical Care  [facility]

## 2024-01-31 NOTE — PROGRESS NOTES
[facility]  Neurocritical Care  Progress Note    Admit Date: 1/27/2024  Service Date: 01/30/2024  Length of Stay: 3    Subjective:     Chief Complaint: Embolic stroke involving left middle cerebral artery    History of Present Illness: Patient is an 89-year-old male with history of atrial fibrillation on anticoagulation, hypertension who presents a transfer from an outside hospital for an acute stroke needing thrombectomy.  Last known normal at 1159 pm.  He initially presented with right-sided weakness, right-sided facial droop and aphasia.  Imaging showed a large vessel occlusion so patient was transferred here for thrombectomy.  No TNK outside of window. Patient s/p thrombectomy TiCi3 with stents in left ICA. Admit to [facility] for neuro monitoring and higher level of care.     Hospital Course: 01/28/2024 Unable to place NG/Keotube, ASA load given AK but unable to administer Plavix. Discussed w/ Vascular Neurology, ok for ASA alone for now. GI consulted for assistance w/ enteral access, plan to see patent tomorrow. Metoprolol x 2 , 500cc NS, and 2g mag given for tachycardia. Discussion w/ wife, aravind @ bedside and daughter on phone, code status changed to DNR.  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, pending speech eval  01/30/2024: new temp, ronchi evident, lactate not elevated but devloping SIRS alteration in VS and encephalopathy, pan cultured including NT specimen and  started on vanc/zosyn, begin trickle feeds, presently not hypotensive    Interval History:      Review of Systems   Unable to perform ROS: Patient nonverbal     Objective:     Vitals:  Temp: 98.7 °F (37.1 °C)  Pulse: 83  Rhythm: paced rhythm  BP: 137/65  MAP (mmHg): 93  Resp: 20  SpO2: 95 %    Temp  Min: 98.7 °F (37.1 °C)  Max: 100.8 °F (38.2 °C)  Pulse  Min: 70  Max: 149  BP  Min: 110/57  Max: 158/70  MAP (mmHg)  Min: 76  Max: 105  Resp  Min: 10  Max: 33  SpO2  Min: 91 %  Max: 99 %    01/29 0701 - 01/30 0700  In: 1538.5 [I.V.:1378.5]  Out: 500 [Urine:500]   Unmeasured Output  Urine Occurrence: 1  Stool Occurrence: 0  Pad Count: 1        Physical Exam  Constitutional:       Comments: obtunded   Eyes:      Pupils: Pupils are equal, round, and reactive to light.      Comments: Left gaze deviation   Cardiovascular:      Rate and Rhythm: Normal rate.   Pulmonary:      Breath sounds: Rhonchi present.   Abdominal:      Palpations: Abdomen is soft.   Musculoskeletal:         General: No swelling.      Cervical back: Neck supple.   Skin:     General: Skin is warm.   Neurological:      Comments: Dense right hemiplegia              Medications:  Continuousnicardipine, Last Rate: 2.5 mg/hr (01/30/24 1800)    Scheduledaspirin, 81 mg, Daily  atorvastatin, 40 mg, Daily  clopidogreL, 75 mg, Daily  heparin (porcine), 5,000 Units, Q8H  hydrALAZINE, 25 mg, Q8H  metoprolol tartrate, 25 mg, QID  mupirocin, , BID  piperacillin-tazobactam (Zosyn) IV (PEDS and ADULTS) (extended infusion is not appropriate), 4.5 g, Q8H  [START ON 1/31/2024] vancomycin (VANCOCIN) IV (PEDS and ADULTS), 1,000 mg, Q24H    PRNacetaminophen, 650 mg, Q6H PRN  bisacodyL, 10 mg, Daily PRN  dextrose 10%, 12.5 g, PRN  dextrose 10%, 25 g, PRN  glucagon (human recombinant), 1 mg, PRN  hydrALAZINE, 10 mg, Q4H PRN  insulin aspart U-100, 0-10 Units, Q6H PRN  labetalol, 10 mg, Q4H PRN  magnesium oxide, 800 mg, PRN  magnesium oxide, 800 mg, PRN  ondansetron, 4 mg, Q8H  PRN  oxymetazoline, 2 spray, BID PRN  potassium bicarbonate, 35 mEq, PRN  potassium bicarbonate, 50 mEq, PRN  potassium bicarbonate, 60 mEq, PRN  potassium, sodium phosphates, 2 packet, PRN  potassium, sodium phosphates, 2 packet, PRN  potassium, sodium phosphates, 2 packet, PRN  vancomycin - pharmacy to dose, , pharmacy to manage frequency      Today I personally reviewed pertinent medications, lines/drains/airways, imaging, laboratory results, microbiology results, notably:    Diet  Diet NPO  Diet NPO        Assessment/Plan:     Cardiac/Vascular  Prolonged Q-T interval on ECG  -Avoid QTc-prolonging agents  Pacer in place, adjust beta blockade for rate control    HTN (hypertension)  -SBP goal <  140  -PRN labetalol/hydralazine  01/30: hold bp lowering agents while in process of developing SIRS/sepsis    A-fib  -Hx of  -NSR w/ episodes of wide QRS tachycardia  -Metoprolol x2, 500cc NS bolus, 2g mag  -Hold home Xarelto  01/30: HR adequate on metoprolol    Other  Fever  Possible associated with aspiration of own oral secretions due to severe dysphagia from stroke  Early culture and abx  Patient remains full code for now          The patient is being Prophylaxed for:  Venous Thromboembolism with: Chemical  Stress Ulcer with: None  Ventilator Pneumonia with: not applicable    Activity Orders            Elevate HOB Elevate (30-45 degrees) Elevate HOB to 30 - 45 degrees during feeding unless otherwise stated starting at 01/30 1108    Progressive Mobility Protocol (mobilize patient to their highest level of functioning at least twice daily) starting at 01/27 2000    Turn patient starting at 01/27 1600    Elevate HOB starting at 01/27 1540    Diet NPO: NPO starting at 01/27 1540        Strongly consider palliative care referral tomorrow  DNR with exception of intubation  CRC 37 min  Marco Barrios MD  Neurocritical Care  [facility]

## 2024-01-31 NOTE — SUBJECTIVE & OBJECTIVE
Interval History:      Review of Systems   Unable to perform ROS: Patient nonverbal       Objective:     Vitals:  Temp: 98.8 °F (37.1 °C)  Pulse: 70  Rhythm: ventricular rhythm, sinus tachycardia  BP: 130/61  MAP (mmHg): 88  Resp: (!) 24  SpO2: 95 %    Temp  Min: 98.2 °F (36.8 °C)  Max: 99.6 °F (37.6 °C)  Pulse  Min: 69  Max: 155  BP  Min: 103/55  Max: 162/81  MAP (mmHg)  Min: 73  Max: 116  Resp  Min: 14  Max: 59  SpO2  Min: 94 %  Max: 100 %    01/30 0701 - 01/31 0700  In: 1093.6 [I.V.:443.1]  Out: 230 [Urine:230]   Unmeasured Output  Urine Occurrence: 150  Stool Occurrence: 1  Pad Count: 1        Physical Exam  Constitutional:       Comments: obtunded   Eyes:      Pupils: Pupils are equal, round, and reactive to light.      Comments: Left gaze deviation   Cardiovascular:      Rate and Rhythm: Tachycardia present.   Pulmonary:      Breath sounds: No rhonchi.   Abdominal:      Palpations: Abdomen is soft.   Musculoskeletal:         General: No swelling.      Cervical back: Neck supple.   Skin:     General: Skin is warm.   Neurological:      Comments: Dense right hemiplegia              Medications:  ContinuousdiltiaZEM (CARDIZEM) 125 mg in dextrose 5 % (D5W) 125 mL infusion    Scheduledaspirin, 81 mg, Daily  atorvastatin, 40 mg, Daily  clopidogreL, 75 mg, Daily  fentaNYL, 12.5 mcg, Once  heparin (porcine), 5,000 Units, Q8H  hydrALAZINE, 25 mg, Q8H  metoprolol tartrate, 50 mg, QID  mupirocin, , BID  piperacillin-tazobactam (Zosyn) IV (PEDS and ADULTS) (extended infusion is not appropriate), 4.5 g, Q8H  sodium chloride 3%, 4 mL, Q6H  vancomycin (VANCOCIN) IV (PEDS and ADULTS), 1,000 mg, Q24H    PRNacetaminophen, 650 mg, Q6H PRN  bisacodyL, 10 mg, Daily PRN  dextrose 10%, 12.5 g, PRN  dextrose 10%, 25 g, PRN  glucagon (human recombinant), 1 mg, PRN  hydrALAZINE, 10 mg, Q4H PRN  insulin aspart U-100, 0-10 Units, Q6H PRN  labetalol, 10 mg, Q4H PRN  magnesium oxide, 800 mg, PRN  magnesium oxide, 800 mg, PRN  ondansetron, 4  mg, Q8H PRN  potassium bicarbonate, 35 mEq, PRN  potassium bicarbonate, 50 mEq, PRN  potassium bicarbonate, 60 mEq, PRN  potassium, sodium phosphates, 2 packet, PRN  potassium, sodium phosphates, 2 packet, PRN  potassium, sodium phosphates, 2 packet, PRN  vancomycin - pharmacy to dose, , pharmacy to manage frequency      Today I personally reviewed pertinent medications, lines/drains/airways, cardiology results, laboratory results, microbiology results, notably:    Diet  Diet NPO  Diet NPO

## 2024-01-31 NOTE — PLAN OF CARE
[facility] Care Plan    POC reviewed with Pierre Jama and family at 1400. Pt unable to verbalized understanding. Pt wife at the bedside is able to verbalize. Questions and concerns addressed. No acute events today. Pt progressing toward goals. Will continue to monitor. See below and flowsheets for full assessment and VS info.   - 5mg metoprolol Given IV x1 with no change in HR of 150. 25mg extra dose of Metoprolol PO given with am with no effect either. Total does increased to 50mg  - Pt given IVP dose of diltiaZEM injection 19.5 mg at 1300 and brought pt HR down from 138 to 69 within a few seconds of pushing. Push was stopped and once HR steady in the 60 rest of the medication was given. Medication given by coworker FABRIZIO Loo as I was placing a new IV in the patient. PT HR stayed WNL until [id number] where he was back up to 139. After the five minutes went back to 80's. Again at [id number] pt HR was 140 but again went back down to 69. There is an order to start Dilt gtt. But HR at this time remains 69 with pacer capturing occasionally. Dilt has been mixed and spiked and ready to run if HR should remain above 120.   - Pt was transitioned from wrist restraint to left mitt as there was concern that pt may still be able to pull the NGT what was placed by GI. Need to protect this tube as he has been very difficult to place the NGT in and difficult to maintain.   - TF has been increased to 30 over the shift with a goal of 45. Orders were given to keep HOB 45 or greater while tube feeds are being run at higher does than 10/hour  - potassium and phos were replaced successfully today  - SBP goals were relaxed from < 140 to < 180, this has not been an issues today  - Pt was weaned from 2 liters NC to RA without issues.   - Attempts to given pt good oral care were unsuccessfully as pt closes his lip very tightly and I was only able to get in his mouth once to try and rinse his mouth and tongue.   - Right groin CDI without  "issues  - NGT, purewick, PIVx 2 remain in place and are in good working order at this time.               Is this a stroke patient? yes- Stroke booklet reviewed with family, risk factors identified for patient and stroke booklet remains at bedside for ongoing education.     Neuro:  [place] Coma Scale  Best Eye Response: 2-->(E2) to pain  Best Motor Response: 5-->(M5) localizes pain  Best Verbal Response: 1-->(V1) none  [place] Coma Scale Score: 8  Assessment Qualifiers: patient not sedated/intubated  Pupil PERRLA: yes     24 hr Temp:  [98.2 °F (36.8 °C)-99.6 °F (37.6 °C)]     CV:   Rhythm: ventricular rhythm, sinus tachycardia  BP goals:   SBP < 180  MAP > 65    Resp:           Plan: N/A    GI/:     Diet/Nutrition Received: NPO, tube feeding  Last Bowel Movement: 01/31/24  Voiding Characteristics: external catheter    Intake/Output Summary (Last 24 hours) at 1/31/2024 1801  Last data filed at 1/31/2024 1700  Gross per 24 hour   Intake 1006.52 ml   Output 530 ml   Net 476.52 ml     Unmeasured Output  Urine Occurrence: 150  Stool Occurrence: 1  Pad Count: 1    Labs/Accuchecks:  Recent Labs   Lab 01/31/24  0115   WBC 8.36   RBC 3.78*   HGB 11.2*   HCT 34.7*         Recent Labs   Lab 01/31/24  0115 01/31/24  1234     --    K 3.5 4.2   CO2 23  --      --    BUN 31*  --    CREATININE 1.2  --    ALKPHOS 84  --    ALT 22  --    AST 25  --    BILITOT 1.1*  --       Recent Labs   Lab 01/29/24  0014   INR 1.0   APTT 24.8    No results for input(s): "CPK", "CPKMB", "TROPONINI", "MB" in the last 168 hours.    Electrolytes: Electrolytes replaced  Accuchecks: ACHS    Gtts:   diltiaZEM (CARDIZEM) 125 mg in dextrose 5 % (D5W) 125 mL infusion         LDA/Wounds:    Nurses Note -- 4 Eyes      1/31/2024   6:01 PM      Skin assessed during: Daily Assessment    Is there altered skin present? no   [x] No Altered Skin Integrity Present    [x]Prevention Measures Documented    Attending Nurse:  Venecia Gaytan RN/Staff " Member:  Eze CASTANEDA   Problem: Adult Inpatient Plan of Care  Goal: Plan of Care Review  Outcome: Ongoing, Progressing  Goal: Patient-Specific Goal (Individualized)  Description: Pt will have no complications with angio site and will mainain SBP less than 140 without continuous BP meds  Outcome: Ongoing, Progressing  Goal: Absence of Hospital-Acquired Illness or Injury  Outcome: Ongoing, Progressing  Goal: Optimal Comfort and Wellbeing  Outcome: Ongoing, Progressing     Problem: Adjustment to Illness (Stroke, Ischemic/Transient Ischemic Attack)  Goal: Optimal Coping  Outcome: Ongoing, Progressing     Problem: Bowel Elimination Impaired (Stroke, Ischemic/Transient Ischemic Attack)  Goal: Effective Bowel Elimination  Outcome: Ongoing, Progressing     Problem: Cerebral Tissue Perfusion (Stroke, Ischemic/Transient Ischemic Attack)  Goal: Optimal Cerebral Tissue Perfusion  Outcome: Ongoing, Progressing     Problem: Respiratory Compromise (Stroke, Ischemic/Transient Ischemic Attack)  Goal: Effective Oxygenation and Ventilation  Outcome: Ongoing, Progressing     Problem: Urinary Elimination Impaired (Stroke, Ischemic/Transient Ischemic Attack)  Goal: Effective Urinary Elimination  Outcome: Ongoing, Progressing     Problem: Diabetes Comorbidity  Goal: Blood Glucose Level Within Targeted Range  Outcome: Ongoing, Progressing     Problem: Restraint, Nonbehavioral (Nonviolent)  Goal: Absence of Harm or Injury  Outcome: Ongoing, Progressing     Problem: Coping Ineffective  Goal: Effective Coping  Outcome: Ongoing, Progressing

## 2024-01-31 NOTE — PT/OT/SLP PROGRESS
Speech Language Pathology      Pierre Jaam  MRN: [id number]    Patient not seen today secondary to Nursing hold (Comment) (heart rate elevated). Will follow-up 2/1.

## 2024-01-31 NOTE — ASSESSMENT & PLAN NOTE
Possible associated with aspiration of own oral secretions due to severe dysphagia from stroke  Early culture and abx  Patient remains full code for now

## 2024-01-31 NOTE — SUBJECTIVE & OBJECTIVE
Interval History:      Review of Systems   Unable to perform ROS: Patient nonverbal     Objective:     Vitals:  Temp: 98.7 °F (37.1 °C)  Pulse: 83  Rhythm: paced rhythm  BP: 137/65  MAP (mmHg): 93  Resp: 20  SpO2: 95 %    Temp  Min: 98.7 °F (37.1 °C)  Max: 100.8 °F (38.2 °C)  Pulse  Min: 70  Max: 149  BP  Min: 110/57  Max: 158/70  MAP (mmHg)  Min: 76  Max: 105  Resp  Min: 10  Max: 33  SpO2  Min: 91 %  Max: 99 %    01/29 0701 - 01/30 0700  In: 1538.5 [I.V.:1378.5]  Out: 500 [Urine:500]   Unmeasured Output  Urine Occurrence: 1  Stool Occurrence: 0  Pad Count: 1        Physical Exam  Constitutional:       Comments: obtunded   Eyes:      Pupils: Pupils are equal, round, and reactive to light.      Comments: Left gaze deviation   Cardiovascular:      Rate and Rhythm: Normal rate.   Pulmonary:      Breath sounds: Rhonchi present.   Abdominal:      Palpations: Abdomen is soft.   Musculoskeletal:         General: No swelling.      Cervical back: Neck supple.   Skin:     General: Skin is warm.   Neurological:      Comments: Dense right hemiplegia              Medications:  Continuousnicardipine, Last Rate: 2.5 mg/hr (01/30/24 1800)    Scheduledaspirin, 81 mg, Daily  atorvastatin, 40 mg, Daily  clopidogreL, 75 mg, Daily  heparin (porcine), 5,000 Units, Q8H  hydrALAZINE, 25 mg, Q8H  metoprolol tartrate, 25 mg, QID  mupirocin, , BID  piperacillin-tazobactam (Zosyn) IV (PEDS and ADULTS) (extended infusion is not appropriate), 4.5 g, Q8H  [START ON 1/31/2024] vancomycin (VANCOCIN) IV (PEDS and ADULTS), 1,000 mg, Q24H    PRNacetaminophen, 650 mg, Q6H PRN  bisacodyL, 10 mg, Daily PRN  dextrose 10%, 12.5 g, PRN  dextrose 10%, 25 g, PRN  glucagon (human recombinant), 1 mg, PRN  hydrALAZINE, 10 mg, Q4H PRN  insulin aspart U-100, 0-10 Units, Q6H PRN  labetalol, 10 mg, Q4H PRN  magnesium oxide, 800 mg, PRN  magnesium oxide, 800 mg, PRN  ondansetron, 4 mg, Q8H PRN  oxymetazoline, 2 spray, BID PRN  potassium bicarbonate, 35 mEq,  PRN  potassium bicarbonate, 50 mEq, PRN  potassium bicarbonate, 60 mEq, PRN  potassium, sodium phosphates, 2 packet, PRN  potassium, sodium phosphates, 2 packet, PRN  potassium, sodium phosphates, 2 packet, PRN  vancomycin - pharmacy to dose, , pharmacy to manage frequency      Today I personally reviewed pertinent medications, lines/drains/airways, imaging, laboratory results, microbiology results, notably:    Diet  Diet NPO  Diet NPO

## 2024-01-31 NOTE — HPI
"Per Chart Review: "Patient is an 89-year-old male with history of atrial fibrillation (Xarelto), HTN, HLD who presents a transfer from an outside hospital for an acute stroke needing thrombectomy. Patient admitted to OSH 1/25 for PNA, hypoxic respiratory failure, and sepsis. On 1/27 found to have new RSW, aphasia, L gaze. LKW midnight 1/27, OOW for TNK. Telestroke NIHSS 26. CTA with L M1 occlusion, CTH with good ASPECTS. [place] [facility] [place] with NIHSS 24. Patient taken immediately to IR, now s/p L M1 thrombectomy with TICI 3 reperfusion as well as L ICA angioplasty and stenting.  Last known normal at 1159 pm.  Imaging showed a large vessel occlusion so patient was transferred here for thrombectomy.  No TNK outside of window. Patient s/p thrombectomy TiCi3 with stents in left ICA. Admit to [facility] for neuro monitoring and higher level of care.      Hospital Course: 01/28/2024 Unable to place NG/Keotube, ASA load given LA but unable to administer Plavix. Discussed w/ Vascular Neurology, ok for ASA alone for now. GI consulted for assistance w/ enteral access, plan to see patent tomorrow. Metoprolol x 2 , 500cc NS, and 2g mag given for tachycardia. Discussion w/ wife, aravind @ bedside and daughter on phone, code status changed to DNR.  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

## 2024-01-31 NOTE — PROGRESS NOTES
[facility] - Neuro Critical Care  Vascular Neurology  Comprehensive Stroke Center  Progress Note    Assessment/Plan:     * Embolic stroke involving left middle cerebral artery  Pierre Jama is a 89 y.o. male with PMH of HTN, HLD, Afib (on xarelto) that was transferred to [facility] [place] with acute L MCA syndrome for intervention of L M1 occlusion. Patient admitted to OSH 1/25 for PNA, hypoxic respiratory failure, and sepsis. On 1/27 found to have new RSW, aphasia, L gaze. LKW midnight 1/27, OOW for TNK. Telestroke NIHSS 26. CTA with L M1 occlusion, CTH with good ASPECTS. On arrival to [facility] [place], neuro exam stable with NIHSS 24. Patient taken immediately to IR, now s/p L M1 thrombectomy with TICI 3 reperfusion as well as L ICA angioplasty and stenting.     Neuro exam stable. Continue to be on cardene drip. NGT in place. SLP recommend NPO.       Antithrombotics for secondary stroke prevention: Antiplatelets: Aspirin: 81 mg daily  Clopidogrel: 75 mg daily for DAPT due to stent. NGT in place now.     Statins for secondary stroke prevention and hyperlipidemia, if present: Statins: Atorvastatin- 40 mg daily    Aggressive risk factor modification: HTN, HLD, Diet, Exercise, Obesity, A-Fib     Rehab efforts: The patient has been evaluated by a stroke team provider and the therapy needs have been fully considered based off the presenting complaints and exam findings. The following therapy evaluations are needed: PT evaluate and treat, OT evaluate and treat, SLP evaluate and treat    Diagnostics ordered/pending: None     VTE prophylaxis: Heparin 5000 units SQ every 8 hours  Mechanical prophylaxis: Place SCDs    BP parameters: Infarct: Post sucessful thrombectomy, SBP <140        Diabetes mellitus  Stroke risk factor   AIC 8.2%  BG goal of 140-180 while inpatient   SSI    Right hemiplegia  Due to stroke   PT/OT to evaluate and treat     Aphasia  Due to stroke  SLP to evaluate and treat     Neurologic deficit due to acute ischemic stroke  Aphasia    Right hemiplegia   -Due to stroke  -Aggressive therapy  -PT/OT/SLP eval and treat    HLD (hyperlipidemia)  -Stroke risk factor  -  -Atorvastatin 40mg daily for LDL goal <70    HTN (hypertension)  -Stroke risk factor  -SBP <140 s/p thrombectomy, maintain MAP >65  -PRN labetalol/hydralazine    A-fib  -Stroke risk factor  -On xarelto, reportedly stopped at OSH due to c/f anemia  -Will need to further discuss timing of when to resume AC for afib         01/28/2024 Continues to have significant L MCA deficits s/p thrombectomy. Overnight had episode of tachycardia in 130s, EKG with wide complex QRS likely ST. Improved with metoprolol. Continues to have intermittent episode of tachycardia per day RN. Unable to get PO access overnight due to tube coiling in stomach. Currently on ASA NE only, will start plavix for DAPT once PO route established. Repeat CTH, echo, therapy evals pending.  01/29/2024 Continue to have L MCA syndrome s/p thrombectomy. Tachycardia with wide complex QRS continue to be an issue. Pacer in place. Awaiting echo  NGT in place; DAPT started. SLP pending.   01/30/2024 Neuro exam stable. Continue to be on cardene drip. NGT in place. SLP recommend NPO.   1-31-24 had few runs of wide QRS tachy overnight and was given IV Lopressor, no change in neuro status    STROKE DOCUMENTATION   Acute Stroke Times   Last Known Normal Date: 01/27/24  Last Known Normal Time: 0000  Stroke Team Called Date: 01/27/24  Stroke Team Called Time: 1511  Stroke Team Arrival Date: 01/27/24  Stroke Team Arrival Time: 1511  CT Interpretation Time: 1210 (OSH imaging reviewed PTA)  Thrombolytic Therapy Recommended: No  CTA Interpretation Time: 1224 (OSH imaging reviewed PTA)  Thrombectomy Recommended: Yes  Decision to Treat Time for IR: 1511    NIH Scale:  1a. Level of Consciousness: 1-->Not alert, but arousable by minor stimulation to obey, answer, or respond  1b. LOC Questions: 2-->Answers neither question correctly  1c. LOC  Commands: 2-->Performs neither task correctly  2. Best Gaze: 1-->Partial gaze palsy, gaze is abnormal in one or both eyes, but forced deviation or total gaze paresis is not present  3. Visual: 1-->Partial hemianopia  4. Facial Palsy: 1-->Minor paralysis (flattened nasolabial fold, asymmetry on smiling)  5a. Motor Arm, Left: 1-->Drift, limb holds 90 (or 45) degrees, but drifts down before full 10 seconds, does not hit bed or other support  5b. Motor Arm, Right: 1-->Drift, limb holds 90 (or 45) degrees, but drifts down before full 10 secs, does not hit bed or other support  6a. Motor Leg, Left: 3-->No effort against gravity, leg falls to bed immediately  6b. Motor Leg, Right: 3-->No effort against gravity, leg falls to bed immediately  7. Limb Ataxia: 0-->Absent  8. Sensory: 0-->Normal, no sensory loss  9. Best Language: 3-->Mute, global aphasia, no usable speech or auditory comprehension  10. Dysarthria: 2-->Severe dysarthria, patients speech is so slurred as to be unintelligible in the absence of or out of proportion to any dysphasia, or is mute/anarthric  11. Extinction and Inattention (formerly Neglect): 2-->Profound cheyenne-inattention/extinction more than 1 modality  Total (NIH Stroke Scale): 23       Modified Barber Score: 0  Crys Coma Scale:8   ABCD2 Score:    [id number] Score:5  HAS -BLED Score:   ICH Score:   Hunt & Chandler Classification:      Hemorrhagic change of an Ischemic Stroke: Does this patient have an ischemic stroke with hemorrhagic changes? No     Neurologic Chief Complaint: L MCA syndrome + L M1 occlusion    Subjective:     Interval History: Patient is seen for follow-up neurological assessment and treatment recommendations: Neuro exam stable.   HPI, Past Medical, Family, and Social History remains the same as documented in the initial encounter.     Review of Systems   Unable to perform ROS: Other (severe aphasia)     Scheduled Meds:   aspirin  81 mg Per NG tube Daily    atorvastatin  40 mg Per  NG tube Daily    clopidogreL  75 mg Per NG tube Daily    fentaNYL  12.5 mcg Intravenous Once    heparin (porcine)  5,000 Units Subcutaneous Q8H    hydrALAZINE  25 mg Per NG tube Q8H    metoprolol tartrate  50 mg Per NG tube QID    mupirocin   Nasal BID    piperacillin-tazobactam (Zosyn) IV (PEDS and ADULTS) (extended infusion is not appropriate)  4.5 g Intravenous Q8H    sodium chloride 3%  4 mL Nebulization Q6H    vancomycin (VANCOCIN) IV (PEDS and ADULTS)  1,000 mg Intravenous Q24H     Continuous Infusions:    PRN Meds:acetaminophen, bisacodyL, dextrose 10%, dextrose 10%, glucagon (human recombinant), hydrALAZINE, insulin aspart U-100, labetalol, magnesium oxide, magnesium oxide, ondansetron, potassium bicarbonate, potassium bicarbonate, potassium bicarbonate, potassium, sodium phosphates, potassium, sodium phosphates, potassium, sodium phosphates, Pharmacy to dose Vancomycin consult **AND** vancomycin - pharmacy to dose    Objective:     Vital Signs (Most Recent):  Temp: 98.8 °F (37.1 °C) (01/31/24 0700)  Pulse: 70 (01/31/24 1330)  Resp: (!) 24 (01/31/24 1330)  BP: 130/61 (01/31/24 1330)  SpO2: 95 % (01/31/24 1330)  BP Location: Right arm    Vital Signs Range (Last 24H):  Temp:  [98.2 °F (36.8 °C)-99.6 °F (37.6 °C)]   Pulse:  []   Resp:  [14-59]   BP: (103-162)/(53-81)   SpO2:  [94 %-100 %]   BP Location: Right arm       Physical Exam  Vitals and nursing note reviewed.   Constitutional:       Appearance: He is not toxic-appearing.   Neurological:      Mental Status: He is lethargic and disoriented.      Cranial Nerves: Dysarthria and facial asymmetry present.      Motor: Weakness present.   Psychiatric:         Speech: He is noncommunicative (severe aphasia).         Behavior: Behavior is cooperative.              Neurological Exam:   LOC: drowsy  Attention Span: poor  Language: Global aphasia  Articulation: Untestable due to severe aphasia   Orientation: Untestable due to severe aphasia   Visual Fields:  Full  EOM (CN III, IV, VI): Gaze preference  left  Facial Movement (CN VII): Lower facial weakness on the Right  Motor: Arm left  Paresis: 2/5  Leg left  Paresis: 2/5  Arm right  Plegia 3/5  Leg right Plegia 3/5  Sensation: Anatoliy-hypoesthesia right    Laboratory:  CMP:   Recent Labs   Lab 01/31/24  0115 01/31/24  1234   CALCIUM 9.4  --    ALBUMIN 3.3*  --    PROT 6.5  --      --    K 3.5 4.2   CO2 23  --      --    BUN 31*  --    CREATININE 1.2  --    ALKPHOS 84  --    ALT 22  --    AST 25  --    BILITOT 1.1*  --        CBC:   Recent Labs   Lab 01/31/24 0115   WBC 8.36   RBC 3.78*   HGB 11.2*   HCT 34.7*      MCV 92   MCH 29.6   MCHC 32.3       Lipid Panel:   Recent Labs   Lab 01/27/24 2011   CHOL 220*   LDLCALC 155.6   HDL 39*   TRIG 127       Coagulation:   Recent Labs   Lab 01/29/24  0014   INR 1.0   APTT 24.8       Hgb A1C:   Recent Labs   Lab 01/28/24  0330   HGBA1C 8.2*       TSH:   Recent Labs   Lab 01/27/24 2011   TSH 1.875         Diagnostic Results     Brain Imaging:    [facility] 1/29/2024   Impression:     Evolving left MCA distribution infarct.  No evidence of hemorrhagic transformation.     Additional multifocal ischemic changes elsewhere in the brain, likely chronic or subacute.  Comparison with prior images, should they become available, could be helpful in further characterizing these findings.    [facility] 1/27/2024 @ OSH  Radiology report not available. No acute ischemia with favorable ASPECTS.  Hyperdense sign in the left distal M1.         Vessel Imaging:  IR cerebral angiogram 1/27/2024  Preliminary interpretation: > 75% stenosis of the L ICA, treated to 0% w angioplasty and stenting; L MCA occlusion treated to TICI 3 w aspiration.  Please see Imaging report for full details.     CTA head/neck 1/27/2024 @ OSH  Impression:  1. There is acute clot in the left distal M1 and proximal superior branch M2 MCA with high-grade stenosis/LVO.  Moderate referral collaterals  2. Chronic LVO of the  distal right vertebral artery.  High-grade stenosis of the proximal left vertebral artery greater than 70%  3. High-grade stenosis right carotid bifurcation 75%.  Moderate grade stenosis on the left 60%      Cardiac Imaging   TTE 01/29/24    Summary    Left Ventricle: The left ventricle is normal in size. Ventricular mass is normal. Normal wall thickness. There is concentric remodeling. Normal wall motion. There is mildly reduced systolic function with a visually estimated ejection fraction of 40 - 45%. Ejection fraction by visual approximation is 43%. The assessment was effected by the 140 heart rates . There is normal diastolic function.    Right Ventricle: Normal right ventricular cavity size. Wall thickness is normal. Right ventricle wall motion  is normal. Systolic function is normal. Pacemaker lead present in the ventricle.    Left Atrium: Left atrium is severely dilated.    Right Atrium: Right atrium is mildly dilated. Lead present in the right atrium.    Aortic Valve: The aortic valve is a trileaflet valve. There is mild aortic valve sclerosis. There is mild annular calcification present.    Mitral Valve: There is bileaflet sclerosis. There is mild regurgitation.    Tricuspid Valve: There is mild regurgitation.    Pulmonary Artery: The estimated pulmonary artery systolic pressure is 35 mmHg.    IVC/SVC: Intermediate venous pressure at 8 mmHg.    Abigail Do, NP  [facility]  Department of Vascular Neurology   [facility] - Neuro Critical Care

## 2024-01-31 NOTE — ASSESSMENT & PLAN NOTE
Pierre Jama is a 89 y.o. male with PMH of HTN, HLD, Afib (on xarelto) that was transferred to [facility] [place] with acute L MCA syndrome for intervention of L M1 occlusion. Patient admitted to OSH 1/25 for PNA, hypoxic respiratory failure, and sepsis. On 1/27 found to have new RSW, aphasia, L gaze. LKW midnight 1/27, OOW for TNK. Telestroke NIHSS 26. CTA with L M1 occlusion, CTH with good ASPECTS. On arrival to [facility] [place], neuro exam stable with NIHSS 24. Patient taken immediately to IR, now s/p L M1 thrombectomy with TICI 3 reperfusion as well as L ICA angioplasty and stenting.     Neuro exam stable. Continue to be on cardene drip. NGT in place. SLP recommend NPO.       Antithrombotics for secondary stroke prevention: Antiplatelets: Aspirin: 81 mg daily  Clopidogrel: 75 mg daily for DAPT due to stent. NGT in place now.     Statins for secondary stroke prevention and hyperlipidemia, if present: Statins: Atorvastatin- 40 mg daily    Aggressive risk factor modification: HTN, HLD, Diet, Exercise, Obesity, A-Fib     Rehab efforts: The patient has been evaluated by a stroke team provider and the therapy needs have been fully considered based off the presenting complaints and exam findings. The following therapy evaluations are needed: PT evaluate and treat, OT evaluate and treat, SLP evaluate and treat    Diagnostics ordered/pending: None     VTE prophylaxis: Heparin 5000 units SQ every 8 hours  Mechanical prophylaxis: Place SCDs    BP parameters: Infarct: Post sucessful thrombectomy, SBP <140

## 2024-01-31 NOTE — SUBJECTIVE & OBJECTIVE
Past Medical History:   Diagnosis Date    A-fib 01/27/2024    Diabetes mellitus     GERD (gastroesophageal reflux disease)     HLD (hyperlipidemia) 01/27/2024    HTN (hypertension) 01/27/2024       Past Surgical History:   Procedure Laterality Date    ESOPHAGOGASTRODUODENOSCOPY N/A 1/29/2024    Procedure: EGD (ESOPHAGOGASTRODUODENOSCOPY);  Surgeon: Kurt Munoz MD;  Location: [address]);  Service: Endoscopy;  Laterality: N/A;    REPLACEMENT OF DUAL CHAMBER PACEMAKER GENERATOR  02/25/2020       Review of patient's allergies indicates:  Not on File    Medications:  Continuous Infusions:   diltiaZEM (CARDIZEM) 125 mg in dextrose 5 % (D5W) 125 mL infusion       Scheduled Meds:   aspirin  81 mg Per NG tube Daily    atorvastatin  40 mg Per NG tube Daily    clopidogreL  75 mg Per NG tube Daily    fentaNYL  12.5 mcg Intravenous Once    heparin (porcine)  5,000 Units Subcutaneous Q8H    hydrALAZINE  25 mg Per NG tube Q8H    metoprolol tartrate  50 mg Per NG tube QID    mupirocin   Nasal BID    piperacillin-tazobactam (Zosyn) IV (PEDS and ADULTS) (extended infusion is not appropriate)  4.5 g Intravenous Q8H    sodium chloride 3%  4 mL Nebulization Q6H    vancomycin (VANCOCIN) IV (PEDS and ADULTS)  1,000 mg Intravenous Q24H     PRN Meds:acetaminophen, bisacodyL, dextrose 10%, dextrose 10%, glucagon (human recombinant), hydrALAZINE, insulin aspart U-100, labetalol, magnesium oxide, magnesium oxide, ondansetron, potassium bicarbonate, potassium bicarbonate, potassium bicarbonate, potassium, sodium phosphates, potassium, sodium phosphates, potassium, sodium phosphates, Pharmacy to dose Vancomycin consult **AND** vancomycin - pharmacy to dose    Family History    None       Tobacco Use    Smoking status: Former     Types: Cigarettes    Smokeless tobacco: Not on file   Substance and Sexual Activity    Alcohol use: Not on file    Drug use: Never    Sexual activity: Not on file       Review of Systems   Unable to  perform ROS: Acuity of condition     Objective:     Vital Signs (Most Recent):  Temp: 98.8 °F (37.1 °C) (01/31/24 0700)  Pulse: 70 (01/31/24 1330)  Resp: (!) 24 (01/31/24 1330)  BP: 130/61 (01/31/24 1330)  SpO2: 95 % (01/31/24 1330) Vital Signs (24h Range):  Temp:  [98.2 °F (36.8 °C)-99.6 °F (37.6 °C)] 98.8 °F (37.1 °C)  Pulse:  [] 70  Resp:  [14-59] 24  SpO2:  [94 %-100 %] 95 %  BP: (103-162)/(53-81) 130/61     Weight: 78.5 kg (173 lb 1 oz)  Body mass index is 24.83 kg/m².       Physical Exam  Constitutional:       Appearance: He is ill-appearing.   Neurological:      Mental Status: He is lethargic.            Review of Symptoms      Symptom Assessment (ESAS 0-10 Scale)  Pain:  0  Dyspnea:  0  Anxiety:  0  Nausea:  0  Depression:  0  Anorexia:  0  Fatigue:  0  Insomnia:  0  Restlessness:  0  Agitation:  0  Unable to complete assessment due to Acuity of condition         Pain Assessment in Advanced Demential Scale (PAINAD)   Breathing - Independent of vocalization:  0  Negative vocalization:  1  Facial expression:  1  Body language:  1  Consolability:  0  Total:  3    Performance Status:  10    Living Arrangements:  Lives with spouse    Psychosocial/Cultural:   See Palliative Psychosocial Note: Yes  Pt and wife live alone at their home. Pt was fully functional until this CVA.   **Primary  to Follow**  Palliative Care  Consult: Yes        Advance Care Planning   Advance Directives:   Do Not Resuscitate: Partial (only intubate) as of now. Will readdress..      Decision Making:  Family answered questions  Goals of Care: The family endorses that what is most important right now is to focus on comfort and QOL     Accordingly, we have decided that the best plan to meet the patient's goals includes continuing with treatment and likely transition to hospice care.          Significant Labs: BMP:   Recent Labs   Lab 01/31/24  0115 01/31/24  1234   *  --      --    K 3.5 4.2   CL  107  --    CO2 23  --    BUN 31*  --    CREATININE 1.2  --    CALCIUM 9.4  --    MG 2.2  --      CBC:   Recent Labs   Lab 01/30/24  0112 01/31/24  0115   WBC 8.01 8.36   HGB 11.5* 11.2*   HCT 35.1* 34.7*    180     CBC:   Recent Labs   Lab 01/31/24  0115   WBC 8.36   HGB 11.2*   HCT 34.7*   MCV 92        BMP:  Recent Labs   Lab 01/31/24  0115 01/31/24  1234   *  --      --    K 3.5 4.2     --    CO2 23  --    BUN 31*  --    CREATININE 1.2  --    CALCIUM 9.4  --    MG 2.2  --      LFT:  Lab Results   Component Value Date    AST 25 01/31/2024    ALKPHOS 84 01/31/2024    BILITOT 1.1 (H) 01/31/2024     Albumin:   Albumin   Date Value Ref Range Status   01/31/2024 3.3 (L) 3.5 - 5.2 g/dL Final     Protein:   Total Protein   Date Value Ref Range Status   01/31/2024 6.5 6.0 - 8.4 g/dL Final     Lactic acid:   Lab Results   Component Value Date    LACTATE 1.1 01/30/2024       Significant Imaging: I have reviewed all pertinent imaging results/findings within the past 24 hours.

## 2024-01-31 NOTE — SUBJECTIVE & OBJECTIVE
Neurologic Chief Complaint: L MCA syndrome + L M1 occlusion    Subjective:     Interval History: Patient is seen for follow-up neurological assessment and treatment recommendations: Neuro exam stable.   HPI, Past Medical, Family, and Social History remains the same as documented in the initial encounter.     Review of Systems   Unable to perform ROS: Other (severe aphasia)     Scheduled Meds:   aspirin  81 mg Per NG tube Daily    atorvastatin  40 mg Per NG tube Daily    clopidogreL  75 mg Per NG tube Daily    fentaNYL  12.5 mcg Intravenous Once    heparin (porcine)  5,000 Units Subcutaneous Q8H    hydrALAZINE  25 mg Per NG tube Q8H    metoprolol tartrate  50 mg Per NG tube QID    mupirocin   Nasal BID    piperacillin-tazobactam (Zosyn) IV (PEDS and ADULTS) (extended infusion is not appropriate)  4.5 g Intravenous Q8H    sodium chloride 3%  4 mL Nebulization Q6H    vancomycin (VANCOCIN) IV (PEDS and ADULTS)  1,000 mg Intravenous Q24H     Continuous Infusions:    PRN Meds:acetaminophen, bisacodyL, dextrose 10%, dextrose 10%, glucagon (human recombinant), hydrALAZINE, insulin aspart U-100, labetalol, magnesium oxide, magnesium oxide, ondansetron, potassium bicarbonate, potassium bicarbonate, potassium bicarbonate, potassium, sodium phosphates, potassium, sodium phosphates, potassium, sodium phosphates, Pharmacy to dose Vancomycin consult **AND** vancomycin - pharmacy to dose    Objective:     Vital Signs (Most Recent):  Temp: 98.8 °F (37.1 °C) (01/31/24 0700)  Pulse: 70 (01/31/24 1330)  Resp: (!) 24 (01/31/24 1330)  BP: 130/61 (01/31/24 1330)  SpO2: 95 % (01/31/24 1330)  BP Location: Right arm    Vital Signs Range (Last 24H):  Temp:  [98.2 °F (36.8 °C)-99.6 °F (37.6 °C)]   Pulse:  []   Resp:  [14-59]   BP: (103-162)/(53-81)   SpO2:  [94 %-100 %]   BP Location: Right arm       Physical Exam  Vitals and nursing note reviewed.   Constitutional:       Appearance: He is not toxic-appearing.   Neurological:      Mental  Status: He is lethargic and disoriented.      Cranial Nerves: Dysarthria and facial asymmetry present.      Motor: Weakness present.   Psychiatric:         Speech: He is noncommunicative (severe aphasia).         Behavior: Behavior is cooperative.              Neurological Exam:   LOC: drowsy  Attention Span: poor  Language: Global aphasia  Articulation: Untestable due to severe aphasia   Orientation: Untestable due to severe aphasia   Visual Fields: Full  EOM (CN III, IV, VI): Gaze preference  left  Facial Movement (CN VII): Lower facial weakness on the Right  Motor: Arm left  Paresis: 2/5  Leg left  Paresis: 2/5  Arm right  Plegia 3/5  Leg right Plegia 3/5  Sensation: Anatoliy-hypoesthesia right    Laboratory:  CMP:   Recent Labs   Lab 01/31/24  0115 01/31/24  1234   CALCIUM 9.4  --    ALBUMIN 3.3*  --    PROT 6.5  --      --    K 3.5 4.2   CO2 23  --      --    BUN 31*  --    CREATININE 1.2  --    ALKPHOS 84  --    ALT 22  --    AST 25  --    BILITOT 1.1*  --        CBC:   Recent Labs   Lab 01/31/24  0115   WBC 8.36   RBC 3.78*   HGB 11.2*   HCT 34.7*      MCV 92   MCH 29.6   MCHC 32.3       Lipid Panel:   Recent Labs   Lab 01/27/24 2011   CHOL 220*   LDLCALC 155.6   HDL 39*   TRIG 127       Coagulation:   Recent Labs   Lab 01/29/24  0014   INR 1.0   APTT 24.8       Hgb A1C:   Recent Labs   Lab 01/28/24  0330   HGBA1C 8.2*       TSH:   Recent Labs   Lab 01/27/24 2011   TSH 1.875         Diagnostic Results     Brain Imaging:    [facility] 1/29/2024   Impression:     Evolving left MCA distribution infarct.  No evidence of hemorrhagic transformation.     Additional multifocal ischemic changes elsewhere in the brain, likely chronic or subacute.  Comparison with prior images, should they become available, could be helpful in further characterizing these findings.    [facility] 1/27/2024 @ OSH  Radiology report not available. No acute ischemia with favorable ASPECTS.  Hyperdense sign in the left distal M1.          Vessel Imaging:  IR cerebral angiogram 1/27/2024  Preliminary interpretation: > 75% stenosis of the L ICA, treated to 0% w angioplasty and stenting; L MCA occlusion treated to TICI 3 w aspiration.  Please see Imaging report for full details.     CTA head/neck 1/27/2024 @ OSH  Impression:  1. There is acute clot in the left distal M1 and proximal superior branch M2 MCA with high-grade stenosis/LVO.  Moderate referral collaterals  2. Chronic LVO of the distal right vertebral artery.  High-grade stenosis of the proximal left vertebral artery greater than 70%  3. High-grade stenosis right carotid bifurcation 75%.  Moderate grade stenosis on the left 60%      Cardiac Imaging   TTE 01/29/24    Summary    Left Ventricle: The left ventricle is normal in size. Ventricular mass is normal. Normal wall thickness. There is concentric remodeling. Normal wall motion. There is mildly reduced systolic function with a visually estimated ejection fraction of 40 - 45%. Ejection fraction by visual approximation is 43%. The assessment was effected by the 140 heart rates . There is normal diastolic function.    Right Ventricle: Normal right ventricular cavity size. Wall thickness is normal. Right ventricle wall motion  is normal. Systolic function is normal. Pacemaker lead present in the ventricle.    Left Atrium: Left atrium is severely dilated.    Right Atrium: Right atrium is mildly dilated. Lead present in the right atrium.    Aortic Valve: The aortic valve is a trileaflet valve. There is mild aortic valve sclerosis. There is mild annular calcification present.    Mitral Valve: There is bileaflet sclerosis. There is mild regurgitation.    Tricuspid Valve: There is mild regurgitation.    Pulmonary Artery: The estimated pulmonary artery systolic pressure is 35 mmHg.    IVC/SVC: Intermediate venous pressure at 8 mmHg.

## 2024-01-31 NOTE — ASSESSMENT & PLAN NOTE
"Impression Pierre Jama is a 89 y.o. male with Hx of HTN, HLD, Afib (on xarelto) that was transferred to [facility] with acute L MCA syndrome for intervention of L M1 occlusion. Patient admitted to OSH 1/25 for PNA, hypoxic respiratory failure, and sepsis. On 1/27 found to have new RSW, aphasia, L gaze. LKW midnight 1/27, OOW for TNK. On arrival to [facility], taken immediately to IR, now s/p L M1 thrombectomy with TICI 3 reperfusion as well as L ICA angioplasty and stenting. Pt has been nonverbal and without following commands since that time. Today, he is on room air and moaning intermittently upon my visit.     Reason for Consult Per communication with KRISTEN. Jaleel ,JENNIFER pall med consulted for GOC/ACP.    ACP/GOC: I met with pt's wife (Anitra) and her son today at . Anitra seems to have a general understanding of his prognosis and states he "won't make it very long". We discussed the difference between his actual stroke and all of the repercussions and comorbidity that goes along with it. She shared with me how independent and active he was up until this hospitalization and stroke. She knows he would not "want to live like this" and is opposed to the idea of artifical life support. We discussed what a PEG tube is pros/cons, and she states that he "would not want that". She shared that it is important for him to be comfortable and that "as a proud man" he would not want to have a life like this. We discussed care and goals associated with hospice and she is interested in moving towards this. She will speak with is children/daughter this evening, and I will revisit in the morning. If pt is transitioned to hospice, she would like it to be at a NH closer to their home in [place], [place].     MPOA : Wife Anitra Jama ([phone])     Symptoms     Recommendations   - Will f/u with wife tomorrow after she speak with his children.  -Will likely need CM assistance w/NH and hospice in/near [place], [place].   "

## 2024-01-31 NOTE — ASSESSMENT & PLAN NOTE
-Hx of  -NSR w/ episodes of wide QRS tachycardia  -Metoprolol x2, 500cc NS bolus, 2g mag  -Hold home Xarelto  01/30: HR adequate on metoprolol

## 2024-01-31 NOTE — NURSING
Pt sleeping well most of night, intermittently agitated with care and neuro checks. Moving left side freely, in soft wrist restraint, tolerating well, right side moves spontaneously with no effort against gravity. Pt having very frequent ectopy during the night, intermittently going into wide QRS tachycardia, HERLINDA Valdez at bedside and multiple ekgs obtained. Pt went back into wide qrs tachycardia around 6am, 5mg IV lopressor given with delayed relief, HR back to 70s paced rhythm. BP labile overnight, cardene off and on, PRN labetalol and PRN hydralazine given. Wife at bedside, stroke education provided. Safety and comfort maintained.

## 2024-01-31 NOTE — ASSESSMENT & PLAN NOTE
-Hx of  -NSR w/ episodes of wide QRS tachycardia  -Metoprolol x2, 500cc NS bolus, 2g mag  -Hold home Xarelto  01/30: HR adequate on metoprolol  01/31: persistent tachycardia up to 150-160 despite beta blocker increase, add dilt

## 2024-02-01 PROBLEM — Z51.5 COMFORT MEASURES ONLY STATUS: Status: ACTIVE | Noted: 2024-01-01

## 2024-02-01 NOTE — PLAN OF CARE
Per Dr. Candelaria, the patient will need the inpatient hospice bed at [facility] with [facility].  Wife at bedside in agreement.    CM phoned Julissa with [facility] [phone].  Per Julissa, someone will come to [facility] to admit the patient to hospice.   Referral sent via [facility].     Farnaz with [facility] informed of the above.  [phone]      Discharge Plan A and Plan B have been determined by review of patient's clinical status, future medical and therapeutic needs, and coverage/benefits for post-acute care in coordination with multidisciplinary team members.      Graciela Baker RN, CCRN-K, [facility]  Neuro-Critical Care   [phone]

## 2024-02-01 NOTE — PT/OT/SLP PROGRESS
Speech Language Pathology      Pierre Jama  MRN: [id number]    Patient not seen today secondary to Nursing hold (Comment) (increased O2 requirement). Will follow-up as decision re: comfort care not yet confirmed.

## 2024-02-01 NOTE — ASSESSMENT & PLAN NOTE
"Impression Pierre Jama is a 89 y.o. male with Hx of HTN, HLD, Afib (on xarelto) that was transferred to [facility] with acute L MCA syndrome for intervention of L M1 occlusion. Patient admitted to OSH 1/25 for PNA, hypoxic respiratory failure, and sepsis. On 1/27 found to have new RSW, aphasia, L gaze. LKW midnight 1/27, OOW for TNK. On arrival to [facility], taken immediately to IR, now s/p L M1 thrombectomy with TICI 3 reperfusion as well as L ICA angioplasty and stenting. Pt has been nonverbal and without following commands since that time. Today, he is on room air and moaning intermittently upon my visit.     Reason for Consult Per communication with KRISTEN. Jaleel ,JENNIFER pall med consulted for GOC/ACP.    ACP/GOC:2/1/24 - Spoke with pt's wife, and gabriel (& his wife) in room this morning. Wife is ready to move forward with NH placement closer to their home and transitioning to hospice. Gabriel mentioned that he spoke with pt's daughter and that they are in agreement with transition to hospice as discussed yesterday. I also called pt's daughter (Narda) and left vm. Communicated with Graciela (JUSTINE) Teri GALARZA NP, and Dr. Candelaria about plans. Will continue to support patient fully at this time, until placement at NH. Family also expressed not wanting pt intubated. Will have MD update code status to Full.     1/31/24  I met with pt's wife (Anitra) and her son today at . Anitra seems to have a general understanding of his prognosis and states he "won't make it very long". We discussed the difference between his actual stroke and all of the repercussions and comorbidity that goes along with it. She shared with me how independent and active he was up until this hospitalization and stroke. She knows he would not "want to live like this" and is opposed to the idea of artifical life support. We discussed what a PEG tube is pros/cons, and she states that he "would not want that". She shared that it is important for him to be comfortable " "and that "as a proud man" he would not want to have a life like this. We discussed care and goals associated with hospice and she is interested in moving towards this. She will speak with is children/daughter this evening, and I will revisit in the morning. If pt is transitioned to hospice, she would like it to be at a NH closer to their home in [place], [place].     MPOA : Wife Anitra Jama ([phone])     Symptoms     Recommendations   - MD to place Full DNR.   -CM assistance w/NH and hospice in/near [place], [place].   "

## 2024-02-01 NOTE — PROGRESS NOTES
Pharmacokinetic Assessment: IV Vancomycin  Progress Note      Assessment/Plan:  - Trough level is therapeutic at 12.8 mcg/mL (drawn appropriately)  - Goal trough 10-20 mcg/mL  - Renal function mostly stable (Scr slightly up to 1.4, UOP 0.4 cc/kg/hr), will CTM closely  - Continue current dose of 1g Q24H  - Collect trough on 2/3 at 1200, or sooner if clinically indicated      Drug levels (last 3 results):  Recent Labs   Lab Result Units 02/01/24  1207   Vancomycin-Trough ug/mL 12.8       Pharmacy will continue to follow and monitor vancomycin.    Please contact pharmacy at extension [phone] for questions regarding this assessment.      Bharati Mota, PharmD  Neurocritical Care Clinical Pharmacist  Ext. [phone]          Patient brief summary:  Pierre Jama is a 89 y.o. male initiated on antimicrobial therapy with IV Vancomycin for treatment of lower respiratory infection      Drug Allergies:   Review of patient's allergies indicates:  Not on File      Renal Function:   Estimated Creatinine Clearance: 36.9 mL/min (based on SCr of 1.4 mg/dL).,     Dialysis Method (if applicable):  N/A

## 2024-02-01 NOTE — DISCHARGE SUMMARY
[facility] - Neuro Critical Care  Neurocritical Care  Discharge Summary    Admit Date: 1/27/2024    Service Date: 02/01/2024    Discharge Date:     Length of Stay: 5    Final Active Diagnoses:    Diagnosis Date Noted POA    PRINCIPAL PROBLEM:  Embolic stroke involving left middle cerebral artery [I63.412] 01/27/2024 Yes    Palliative care encounter [Z51.5] 01/31/2024 Not Applicable    ACP (advance care planning) [Z71.89] 01/31/2024 Not Applicable    Fever [R50.9] 01/30/2024 No    Diabetes mellitus [E11.9] 01/29/2024 Yes    A-fib [I48.91] 01/27/2024 Yes     Chronic    HTN (hypertension) [I10] 01/27/2024 Yes     Chronic    HLD (hyperlipidemia) [E78.5] 01/27/2024 Yes     Chronic    Neurologic deficit due to acute ischemic stroke [I63.9, R29.818] 01/27/2024 Yes    Aphasia [R47.01] 01/27/2024 Yes    Right hemiplegia [G81.91] 01/27/2024 Yes    Debility [R53.81] 01/27/2024 Yes    Prolonged Q-T interval on ECG [R94.31] 01/27/2024 Yes      Problems Resolved During this Admission:      History of Present Illness: Patient is an 89-year-old male with history of atrial fibrillation on anticoagulation, hypertension who presents a transfer from an outside hospital for an acute stroke needing thrombectomy.  Last known normal at 1159 pm.  He initially presented with right-sided weakness, right-sided facial droop and aphasia.  Imaging showed a large vessel occlusion so patient was transferred here for thrombectomy.  No TNK outside of window. Patient s/p thrombectomy TiCi3 with stents in left ICA. Admit to [facility] for neuro monitoring and higher level of care.     Hospital Course by Event: 01/28/2024 Unable to place NG/Keotube, ASA load given CA but unable to administer Plavix. Discussed w/ Vascular Neurology, ok for ASA alone for now. GI consulted for assistance w/ enteral access, plan to see patent tomorrow. Metoprolol x 2 , 500cc NS, and 2g mag given for tachycardia. Discussion w/ wife, judyon @ bedside and daughter on phone, code  status changed to DNR.  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, pending speech eval  01/30/2024: new temp, ronchi evident, lactate not elevated but devloping SIRS alteration in VS and encephalopathy, pan cultured including NT specimen and started on vanc/zosyn, begin trickle feeds, presently not hypotensive  01/31/2024: no change in neuro status, from my discussion with wife yesterday, it appears that end of life discussions along with determining goals of care in settings of significant disability were not performed, consult palliative, RVR more of a pronlem but not occurring with significant hypotension, dilt iv bolus appears to have worked-will need to be repeated when dilt gtt arrives  02/01: hypoxia and pulmonary secretions have worsened, HR controlled on combo of dilt and metoprolol, due to continued advancement of his pulmonary condition, it is reasonable to not delay initiation of comfort care and begin immediately with [facility], transfer canceled to outside facility    Hospital Course by Problem:   * Embolic stroke involving left middle cerebral artery  88 y/o M w/ L ICA occlusion and L MCA occlusion. S/p thrombectomy w/ TiCi3 reperfusion and stent in L ICA.       Antithrombotics for secondary stroke prevention: Antiplatelets: Aspirin: 300 mg NJ daily; will start Plavix once enteral access established    Statins for secondary stroke prevention and hyperlipidemia, if present:    Statins: Atorvastatin- 40 mg daily once enteral access established    Aggressive risk factor modification: A-Fib     Rehab efforts: The patient has been evaluated by a stroke team provider and the therapy needs have been fully considered based off the presenting complaints and exam findings. The following therapy evaluations are needed: PT evaluate and treat, OT evaluate and treat, SLP evaluate and treat, PM&R evaluate for appropriate placement    Diagnostics ordered/pending: MRI head without contrast to assess brain parenchyma, TTE to assess cardiac function/status  Unclear if pacemaker is MRI compatible; no documentation from outside hospital    VTE prophylaxis: Heparin 5000 units SQ every 8 hours    BP parameters: Infarct: Post sucessful thrombectomy, SBP <140    -Admitted to [facility]  -Vascular Neurology following  -VS/Neuro checks q1h  -SBP goal < 140   -PRN labetalol/hydralazine  -Atorvastatin daily once enteral access established  -ASA/Plavix once enteral access established - continue ASA WI for now  -PT/OT/SLP  -GI consulted to assist w/ enteral access  -CBC, CMP, Mag, Phos daily    Palliative care encounter  Will move to comfort care only and patient is full DNR    Fever  Possible associated with aspiration of own oral secretions due to severe dysphagia from stroke  Early culture and abx  Patient remains full code for now    Diabetes mellitus  -Hgb A1c 8.2  -Accuchecks q6 w/ low dose SSI    Prolonged Q-T interval on ECG  -Avoid QTc-prolonging agents  Pacer in place, adjust beta blockade for rate control    Debility  Level of appreciation of the severity of this is low  Palliative care consulted    Right hemiplegia  -PT/OT    Aphasia  -SLP    Neurologic deficit due to acute ischemic stroke  Left mca thromboembolic stroke  Antithrombotics for secondary stroke prevention: Antiplatelets: Aspirin: 81 mg daily  Clopidogrel: 75 mg daily    Statins for secondary stroke prevention and hyperlipidemia, if present:    Statins: Atorvastatin- 40 mg daily    Aggressive risk factor modification: HTN, HLD     Rehab efforts: The patient has been evaluated by a stroke team provider and the therapy needs have been fully considered based off the presenting complaints and exam findings. The following therapy evaluations are needed: SLP evaluate and treat    Diagnostics ordered/pending: TTE to assess cardiac function/status     VTE prophylaxis: Heparin 5000 units SQ every 8 hours    BP parameters: Infarct: Post sucessful thrombectomy, SBP <140        HLD (hyperlipidemia)  -Atorvastatin daily once enteral access established    HTN (hypertension)  -SBP goal <  140  -PRN labetalol/hydralazine  01/30: hold bp lowering agents while in process of developing SIRS/sepsis  01/31: dilt added mostly due to refractory RVR    A-fib  -Hx of  -NSR w/ episodes of wide QRS tachycardia  -Metoprolol x2, 500cc NS bolus, 2g mag  -Hold home Xarelto  01/30: HR adequate on metoprolol  01/31: persistent tachycardia up to 150-160 despite beta blocker increase, add dilt        Goals of Care Treatment Preferences:  Code Status: Partial Code          What is most important right now is to focus on comfort and QOL .        Significant Results:  Imaging:      Cardiology:      Microbiology:      Laboratory:  Lab Results   Component Value Date    HGBA1C 8.2 (H) 01/28/2024    CHOL 220 (H) 01/27/2024    HDL 39 (L) 01/27/2024    LDLCALC 155.6 01/27/2024    TRIG 127 01/27/2024    TSH 1.875 01/27/2024       Pending Results:     Consultations:  IP CONSULT TO GI  IP CONSULT TO REGISTERED DIETITIAN/NUTRITIONIST  PHARMACY TO DOSE VANCOMYCIN CONSULT  IP CONSULT TO PALLIATIVE CARE      Procedures:   Procedure(s) (LRB):  EGD (ESOPHAGOGASTRODUODENOSCOPY) (N/A) by Kurt Munoz MD.      Medications:      Medication List        ASK your doctor about these medications      diltiaZEM 240 MG 24 hr capsule  Commonly known as: CARDIZEM CD     furosemide 20 MG tablet  Commonly known as:  LASIX     omeprazole 20 MG capsule  Commonly known as: PRILOSEC     sotaloL 160 MG Tab  Commonly known as: BETAPACE     XARELTO 15 mg Tab  Generic drug: rivaroxaban            Diet:     Activity:    Disposition: Discharged to  hospice    Follow Up Plan:      This discharge took less than 30 minutes to complete.    Marco Barrios MD  Neurocritical Care  [facility] - Neuro Critical Care

## 2024-02-01 NOTE — PLAN OF CARE
02/01/24 1328   Post-Acute Status   Post-Acute Authorization Hospice   Hospice Status Referrals Sent   Coverage [employer]   Patient choice form signed by patient/caregiver List with quality metrics by geographic area provided   Discharge Delays None known at this time   Discharge Plan   Discharge Plan A Inpatient Hospice   Discharge Plan B Inpatient Hospice       Met with Patient's wife to review discharge recommendation of inpatient hospice at [facility] and is agreeable to plan    Patient/family provided list of facilities in-network with patient's payor plan. Providers that are owned, operated, or affiliated with [facility] are included on the list.     Notified that referral sent to below listed facilities from in-network list based on proximity to home/family support:   [facility]   2.  3.  4.  5. (can send more than 5)    Patient/family instructed to identify preference.    Preferred Facility: (if more than 1, listed in order of descending preference)  [facility]    If an additional preferred facility not listed above is identified, additional referral to be sent. If above facilities unable to accept, will send additional referrals to in-network providers.     Discharge Plan A and Plan B have been determined by review of patient's clinical status, future medical and therapeutic needs, and coverage/benefits for post-acute care in coordination with multidisciplinary team members.      Graciela Baker RN, CCRN-K, [facility]  Neuro-Critical Care   [phone]

## 2024-02-01 NOTE — PLAN OF CARE
JUSTINE spoke with Emily at [facility].  Per Emily, she will speak with [facility] and let CM know when the patient can be accepted.   PASRR/142 uploaded to [facility] and sent to [facility] via [facility].     CM spoke with Emily at [facility] [phone].  Per Emily, she has accepted that patient at the per israel rate, but cannot take the patient until [facility] delivers the dme needed.  Per Emily, she has reached out to [facility], but has not gotten a definite answer as to when the dme will arrive.  CM phoned Farnaz at [facility] [phone].  No answer.  Voicemail is full.      Per Dr. Candelaria, he is making the patient comfort care now.  Per Dr. Candelaria, he does not think the patient will be able to make the trip to [place] tomorrow.       Discharge Plan A and Plan B have been determined by review of patient's clinical status, future medical and therapeutic needs, and coverage/benefits for post-acute care in coordination with multidisciplinary team members.      Graciela Baker RN, CCRN-K, [facility]  Neuro-Critical Care   X [phone]

## 2024-02-01 NOTE — PLAN OF CARE
CM informed by Teri with Palliative Medicine that patient's [facility] [place], [place].  Per Dr. Barrios, he would like to keep all medications and oxygen for the ride home for patient.  CM met with patient's wife and daughter in room to discuss.  Per wife and daughter, they would like [facility].  Referral sent in [facility].      [facility]  Address: [address], [place] [address]  Phone: [phone]  Fax: [phone]       10:49 AM  CM tried x 3 to reach the Admissions Dept at [facility].  On hold x 15 minutes x3.  Unable to hold any longer at this time.  CM will retry.   Per  at [facility], the hospice companies they use are  [facility].     11:11 AM    CM phoned [facility] [phone], and spoke with Karen. Per Karen, she will pull the referral and have Emily call this CM back.     Discharge Plan A and Plan B have been determined by review of patient's clinical status, future medical and therapeutic needs, and coverage/benefits for post-acute care in coordination with multidisciplinary team members.      1:26 PM    CM met with patient's wife at [facility] to discuss hospice.  Wife informed of the 3 companies that are used by [facility].  Per wife, she has no preference.  [facility].     Graciela Baker RN, CCRN-K, [facility]  Neuro-Critical Care   X [phone]

## 2024-02-01 NOTE — PROGRESS NOTES
VANCOMYCIN DOSING BY PHARMACY DISCONTINUATION NOTE    Pierre Jama is a 89 y.o. male who had been consulted for vancomycin dosing.    The pharmacy consult for vancomycin dosing has been discontinued.     Vancomycin Dosing by Pharmacy Consult will sign-off. Please reconsult if necessary. Thank you for allowing us to participate in this patient's care.     Lora Najera, Pharm.D.,[facility]  Phone: [phone]

## 2024-02-01 NOTE — SUBJECTIVE & OBJECTIVE
Past Medical History:   Diagnosis Date    A-fib 01/27/2024    Diabetes mellitus     GERD (gastroesophageal reflux disease)     HLD (hyperlipidemia) 01/27/2024    HTN (hypertension) 01/27/2024       Past Surgical History:   Procedure Laterality Date    ESOPHAGOGASTRODUODENOSCOPY N/A 1/29/2024    Procedure: EGD (ESOPHAGOGASTRODUODENOSCOPY);  Surgeon: Kurt Munoz MD;  Location: [address]);  Service: Endoscopy;  Laterality: N/A;    REPLACEMENT OF DUAL CHAMBER PACEMAKER GENERATOR  02/25/2020       Review of patient's allergies indicates:  Not on File    Medications:  Continuous Infusions:   diltiaZEM (CARDIZEM) 125 mg in dextrose 5 % (D5W) 125 mL infusion 2.5 mg/hr (02/01/24 0835)     Scheduled Meds:   aspirin  81 mg Per NG tube Daily    atorvastatin  40 mg Per NG tube Daily    clopidogreL  75 mg Per NG tube Daily    diltiaZEM  30 mg Per NG tube Q8H    heparin (porcine)  5,000 Units Subcutaneous Q8H    hydrALAZINE  25 mg Per NG tube Q8H    insulin aspart U-100  2 Units Subcutaneous Q6H    metoprolol tartrate  50 mg Per NG tube QID    mupirocin   Nasal BID    piperacillin-tazobactam (Zosyn) IV (PEDS and ADULTS) (extended infusion is not appropriate)  4.5 g Intravenous Q8H    sodium chloride 3%  4 mL Nebulization Q6H    tuberculin  5 Units Intradermal Once    vancomycin (VANCOCIN) IV (PEDS and ADULTS)  1,000 mg Intravenous Q24H     PRN Meds:acetaminophen, bisacodyL, dextrose 10%, dextrose 10%, glucagon (human recombinant), hydrALAZINE, insulin aspart U-100, labetalol, magnesium oxide, magnesium oxide, ondansetron, potassium bicarbonate, potassium bicarbonate, potassium bicarbonate, potassium, sodium phosphates, potassium, sodium phosphates, potassium, sodium phosphates, Pharmacy to dose Vancomycin consult **AND** vancomycin - pharmacy to dose    Family History    None       Tobacco Use    Smoking status: Former     Types: Cigarettes    Smokeless tobacco: Not on file   Substance and Sexual Activity     Alcohol use: Not on file    Drug use: Never    Sexual activity: Not on file       Review of Systems   Unable to perform ROS: Acuity of condition     Objective:     Vital Signs (Most Recent):  Temp: 97 °F (36.1 °C) (02/01/24 0705)  Pulse: 71 (02/01/24 0913)  Resp: (!) 40 (02/01/24 1054)  BP: (!) 123/59 (02/01/24 0913)  SpO2: 97 % (02/01/24 0913) Vital Signs (24h Range):  Temp:  [97 °F (36.1 °C)-99.1 °F (37.3 °C)] 97 °F (36.1 °C)  Pulse:  [] 71  Resp:  [18-42] 40  SpO2:  [87 %-100 %] 97 %  BP: (107-152)/(52-84) 123/59     Weight: 78.5 kg (173 lb 1 oz)  Body mass index is 24.83 kg/m².       Physical Exam  Constitutional:       Appearance: He is ill-appearing.   Neurological:      Mental Status: He is lethargic.            Review of Symptoms      Symptom Assessment (ESAS 0-10 Scale)  Pain:  0  Dyspnea:  0  Anxiety:  0  Nausea:  0  Depression:  0  Anorexia:  0  Fatigue:  0  Insomnia:  0  Restlessness:  0  Agitation:  0 due to Acuity of condition         Pain Assessment in Advanced Demential Scale (PAINAD)   Breathing - Independent of vocalization:  0  Negative vocalization:  1  Facial expression:  1  Body language:  1  Consolability:  0  Total:  3    Performance Status:  10    Living Arrangements:  Lives with spouse    Psychosocial/Cultural:   See Palliative Psychosocial Note: Yes  Pt and wife live alone at their home. Pt was fully functional until this CVA.   **Primary  to Follow**  Palliative Care  Consult: Yes        Advance Care Planning   Advance Directives:   Do Not Resuscitate: Partial (only intubate) as of now. Will readdress..      Decision Making:  Family answered questions  Goals of Care: What is most important right now is to focus on comfort and QOL . Accordingly, we have decided that the best plan to meet the patient's goals includes transitioning to hospice at a NH closer to their home.          Significant Labs: BMP:   Recent Labs   Lab 02/01/24  0020   *       K 3.8      CO2 24   BUN 28*   CREATININE 1.4   CALCIUM 9.2   MG 2.4       CBC:   Recent Labs   Lab 01/31/24  0115 02/01/24  0020   WBC 8.36 8.44   HGB 11.2* 11.3*   HCT 34.7* 34.6*    198       CBC:   Recent Labs   Lab 02/01/24  0020   WBC 8.44   HGB 11.3*   HCT 34.6*   MCV 91          BMP:  Recent Labs   Lab 02/01/24  0020   *      K 3.8      CO2 24   BUN 28*   CREATININE 1.4   CALCIUM 9.2   MG 2.4       LFT:  Lab Results   Component Value Date    AST 34 02/01/2024    ALKPHOS 85 02/01/2024    BILITOT 0.7 02/01/2024     Albumin:   Albumin   Date Value Ref Range Status   02/01/2024 3.1 (L) 3.5 - 5.2 g/dL Final     Protein:   Total Protein   Date Value Ref Range Status   02/01/2024 6.5 6.0 - 8.4 g/dL Final     Lactic acid:   Lab Results   Component Value Date    LACTATE 1.3 02/01/2024    LACTATE 1.1 01/30/2024       Significant Imaging: I have reviewed all pertinent imaging results/findings within the past 24 hours.

## 2024-02-01 NOTE — PLAN OF CARE
LOCEt completed via telephone, [phone]  Pass-R faxed today to [phone]     number-   Await 142 from dept of health

## 2024-02-01 NOTE — PLAN OF CARE
JUSTINE received a call from Farnaz at [facility] [phone].  Per Farnaz, she will come to the bedside to see the patient.     JUSTINE phoned Emily at [facility] [phone] to find out the status of acceptance.  Message left on voicemail for Eimly to call JUSTINE back.     Discharge Plan A and Plan B have been determined by review of patient's clinical status, future medical and therapeutic needs, and coverage/benefits for post-acute care in coordination with multidisciplinary team members.      Graciela Baker RN, CCRN-K, [facility]  Neuro-Critical Care   [phone]

## 2024-02-01 NOTE — PLAN OF CARE
02/01/24 1337   Discharge Reassessment   Assessment Type Discharge Planning Reassessment   Did the patient's condition or plan change since previous assessment? No   Discharge Plan discussed with: Spouse/sig other;Adult children   Communicated CRESCENCIO with patient/caregiver Yes   Discharge Plan A New Nursing Home placement - [facility] care facility   Discharge Plan B Hospice/home   Transition of Care Barriers None   Why the patient remains in the hospital Requires continued medical care   Post-Acute Status   Hospice Status Pending medical clearance/testing   Discharge Delays None known at this time

## 2024-02-01 NOTE — PROGRESS NOTES
[facility] Critical [facility]  Palliative Medicine  Progress Note    Patient Name: Pierre Jama  MRN: [id number]  Admission Date: 1/27/2024  Hospital Length of Stay: 5 days  Code Status: Partial Code   Attending Provider: Marco Candelaria MD  Consulting Provider: PASHA Wood  Primary Care Physician: Estrada Ham MD  Principal Problem:Embolic stroke involving left middle cerebral artery    Patient information was obtained from spouse/SO, relative(s), and primary team.      Assessment/Plan:     Palliative Care  Palliative care encounter  Impression Pierre Jama is a 89 y.o. male with Hx of HTN, HLD, Afib (on xarelto) that was transferred to [facility] with acute L MCA syndrome for intervention of L M1 occlusion. Patient admitted to OSH 1/25 for PNA, hypoxic respiratory failure, and sepsis. On 1/27 found to have new RSW, aphasia, L gaze. LKW midnight 1/27, OOW for TNK. On arrival to [facility], taken immediately to IR, now s/p L M1 thrombectomy with TICI 3 reperfusion as well as L ICA angioplasty and stenting. Pt has been nonverbal and without following commands since that time. Today, he is on room air and moaning intermittently upon my visit.     Reason for Consult Per communication with GOGO Marmolejo NP pall med consulted for GOC/ACP.    ACP/GOC:2/1/24 - Spoke with pt's wife, and araivnd (& his wife) in room this morning. Wife is ready to move forward with NH placement closer to their home and transitioning to hospice. Maria Con mentioned that he spoke with pt's daughter and that they are in agreement with transition to hospice as discussed yesterday. I also called pt's daughter (Narda) and left vm. Communicated with Graciela (JUSTINE) Teri GALARZA NP, and Dr. Candelaria about plans. Will continue to support patient fully at this time, until placement at NH. Family also expressed not wanting pt intubated. Will have MD update code status to Full.     1/31/24  I met with pt's wife (Anitra) and her son today at bs. Anitra  "seems to have a general understanding of his prognosis and states he "won't make it very long". We discussed the difference between his actual stroke and all of the repercussions and comorbidity that goes along with it. She shared with me how independent and active he was up until this hospitalization and stroke. She knows he would not "want to live like this" and is opposed to the idea of artifical life support. We discussed what a PEG tube is pros/cons, and she states that he "would not want that". She shared that it is important for him to be comfortable and that "as a proud man" he would not want to have a life like this. We discussed care and goals associated with hospice and she is interested in moving towards this. She will speak with is children/daughter this evening, and I will revisit in the morning. If pt is transitioned to hospice, she would like it to be at a NH closer to their home in [place], [place].     MPOA : Wife Anitra Jama ([phone])     Symptoms     Recommendations   - MD to place Full DNR.   -CM assistance w/NH and hospice in/near [place], [place].         I will follow-up with patient. Please contact us if you have any additional questions.    Subjective:     Chief Complaint:   Chief Complaint   Patient presents with    Weakness     Transfer from [facility] for neuro evaluation.       HPI:   Per Chart Review: "Patient is an 89-year-old male with history of atrial fibrillation (Xarelto), HTN, HLD who presents a transfer from an outside hospital for an acute stroke needing thrombectomy. Patient admitted to OSH 1/25 for PNA, hypoxic respiratory failure, and sepsis. On 1/27 found to have new RSW, aphasia, L gaze. LKW midnight 1/27, OOW for TNK. Telestroke NIHSS 26. CTA with L M1 occlusion, CTH with good ASPECTS. On arrival to [facility], neuro exam stable with NIHSS 24. Patient taken immediately to IR, now s/p L M1 thrombectomy with TICI 3 reperfusion as well as L ICA angioplasty and stenting.  Last known normal " "at 1159 pm.  Imaging showed a large vessel occlusion so patient was transferred here for thrombectomy.  No TNK outside of window. Patient s/p thrombectomy TiCi3 with stents in left ICA. Admit to [facility] for neuro monitoring and higher level of care.      Hospital Course: 01/28/2024 Unable to place NG/Keotube, ASA load given GA but unable to administer Plavix. Discussed w/ Vascular Neurology, ok for ASA alone for now. GI consulted for assistance w/ enteral access, plan to see patent tomorrow. Metoprolol x 2 , 500cc NS, and 2g mag given for tachycardia. Discussion w/ wife, aravind @ bedside and daughter on phone, code status changed to DNR.  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, pending speech eval  01/30/2024: new temp, ronchi evident, lactate not elevated but devloping SIRS alteration in VS and encephalopathy, pan cultured including NT specimen and started on vanc/zosyn, begin trickle feeds, presently not hypotensive"    Hospital Course:  No notes on file      Past Medical History:   Diagnosis Date    A-fib 01/27/2024    Diabetes mellitus     GERD (gastroesophageal reflux disease)     HLD (hyperlipidemia) 01/27/2024    HTN (hypertension) 01/27/2024       Past Surgical History:   Procedure Laterality Date    ESOPHAGOGASTRODUODENOSCOPY N/A 1/29/2024    Procedure: EGD (ESOPHAGOGASTRODUODENOSCOPY);  Surgeon: Kurt Munoz MD;  Location: [address];  Service: Endoscopy;  Laterality: N/A; "    REPLACEMENT OF DUAL CHAMBER PACEMAKER GENERATOR  02/25/2020       Review of patient's allergies indicates:  Not on File    Medications:  Continuous Infusions:   diltiaZEM (CARDIZEM) 125 mg in dextrose 5 % (D5W) 125 mL infusion 2.5 mg/hr (02/01/24 0835)     Scheduled Meds:   aspirin  81 mg Per NG tube Daily    atorvastatin  40 mg Per NG tube Daily    clopidogreL  75 mg Per NG tube Daily    diltiaZEM  30 mg Per NG tube Q8H    heparin (porcine)  5,000 Units Subcutaneous Q8H    hydrALAZINE  25 mg Per NG tube Q8H    insulin aspart U-100  2 Units Subcutaneous Q6H    metoprolol tartrate  50 mg Per NG tube QID    mupirocin   Nasal BID    piperacillin-tazobactam (Zosyn) IV (PEDS and ADULTS) (extended infusion is not appropriate)  4.5 g Intravenous Q8H    sodium chloride 3%  4 mL Nebulization Q6H    tuberculin  5 Units Intradermal Once    vancomycin (VANCOCIN) IV (PEDS and ADULTS)  1,000 mg Intravenous Q24H     PRN Meds:acetaminophen, bisacodyL, dextrose 10%, dextrose 10%, glucagon (human recombinant), hydrALAZINE, insulin aspart U-100, labetalol, magnesium oxide, magnesium oxide, ondansetron, potassium bicarbonate, potassium bicarbonate, potassium bicarbonate, potassium, sodium phosphates, potassium, sodium phosphates, potassium, sodium phosphates, Pharmacy to dose Vancomycin consult **AND** vancomycin - pharmacy to dose    Family History    None       Tobacco Use    Smoking status: Former     Types: Cigarettes    Smokeless tobacco: Not on file   Substance and Sexual Activity    Alcohol use: Not on file    Drug use: Never    Sexual activity: Not on file       Review of Systems   Unable to perform ROS: Acuity of condition     Objective:     Vital Signs (Most Recent):  Temp: 97 °F (36.1 °C) (02/01/24 0705)  Pulse: 71 (02/01/24 0913)  Resp: (!) 40 (02/01/24 1054)  BP: (!) 123/59 (02/01/24 0913)  SpO2: 97 % (02/01/24 0913) Vital Signs (24h Range):  Temp:  [97 °F (36.1 °C)-99.1 °F (37.3 °C)] 97 °F (36.1 °C)  Pulse:  []  71  Resp:  [18-42] 40  SpO2:  [87 %-100 %] 97 %  BP: (107-152)/(52-84) 123/59     Weight: 78.5 kg (173 lb 1 oz)  Body mass index is 24.83 kg/m².       Physical Exam  Constitutional:       Appearance: He is ill-appearing.   Neurological:      Mental Status: He is lethargic.            Review of Symptoms      Symptom Assessment (ESAS 0-10 Scale)  Pain:  0  Dyspnea:  0  Anxiety:  0  Nausea:  0  Depression:  0  Anorexia:  0  Fatigue:  0  Insomnia:  0  Restlessness:  0  Agitation:  0 due to Acuity of condition         Pain Assessment in Advanced Demential Scale (PAINAD)   Breathing - Independent of vocalization:  0  Negative vocalization:  1  Facial expression:  1  Body language:  1  Consolability:  0  Total:  3    Performance Status:  10    Living Arrangements:  Lives with spouse    Psychosocial/Cultural:   See Palliative Psychosocial Note: Yes  Pt and wife live alone at their home. Pt was fully functional until this CVA.   **Primary  to Follow**  Palliative Care  Consult: Yes        Advance Care Planning  Advance Directives:   Do Not Resuscitate: Partial (only intubate) as of now. Will readdress..      Decision Making:  Family answered questions  Goals of Care: What is most important right now is to focus on comfort and QOL . Accordingly, we have decided that the best plan to meet the patient's goals includes transitioning to hospice at a NH closer to their home.          Significant Labs: BMP:   Recent Labs   Lab 02/01/24  0020   *      K 3.8      CO2 24   BUN 28*   CREATININE 1.4   CALCIUM 9.2   MG 2.4       CBC:   Recent Labs   Lab 01/31/24  0115 02/01/24  0020   WBC 8.36 8.44   HGB 11.2* 11.3*   HCT 34.7* 34.6*    198       CBC:   Recent Labs   Lab 02/01/24  0020   WBC 8.44   HGB 11.3*   HCT 34.6*   MCV 91          BMP:  Recent Labs   Lab 02/01/24  0020   *      K 3.8      CO2 24   BUN 28*   CREATININE 1.4   CALCIUM 9.2   MG 2.4        LFT:  Lab Results   Component Value Date    AST 34 02/01/2024    ALKPHOS 85 02/01/2024    BILITOT 0.7 02/01/2024     Albumin:   Albumin   Date Value Ref Range Status   02/01/2024 3.1 (L) 3.5 - 5.2 g/dL Final     Protein:   Total Protein   Date Value Ref Range Status   02/01/2024 6.5 6.0 - 8.4 g/dL Final     Lactic acid:   Lab Results   Component Value Date    LACTATE 1.3 02/01/2024    LACTATE 1.1 01/30/2024       Significant Imaging: I have reviewed all pertinent imaging results/findings within the past 24 hours.    > 50% of 55 min visit spent in chart review, face to face discussion of goals of care, charting, symptom assessment, coordination of care and emotional support   Teri Paredes, CNS  Palliative Medicine  [facility] - Neuro Critical Care

## 2024-02-01 NOTE — PLAN OF CARE
"[facility] Care Plan    POC reviewed with Pierre Jama and family at 1400. Pt's family verbalized understanding. Questions and concerns addressed. No acute events today. Pt progressing toward goals. Will continue to monitor. See below and flowsheets for full assessment and VS info.             Is this a stroke patient? yes- Stroke booklet reviewed with family, risk factors identified for patient and stroke booklet remains at bedside for ongoing education.     Neuro:  [place] Coma Scale  Best Eye Response: 2-->(E2) to pain  Best Motor Response: 5-->(M5) localizes pain  Best Verbal Response: 2-->(V2) incomprehensible speech  [place] Coma Scale Score: 9  Assessment Qualifiers: patient not sedated/intubated, no eye obstruction present  Pupil PERRLA: yes     24 hr Temp:  [97 °F (36.1 °C)-99.1 °F (37.3 °C)]     CV:   Rhythm: paced rhythm  BP goals:   SBP < 140  MAP > 65    Resp:      Oxygen Concentration (%): 50    Plan: N/A    GI/:     Diet/Nutrition Received: tube feeding  Last Bowel Movement: 02/01/24  Voiding Characteristics: external catheter    Intake/Output Summary (Last 24 hours) at 2/1/2024 1625  Last data filed at 2/1/2024 1539  Gross per 24 hour   Intake 1592.2 ml   Output 1215 ml   Net 377.2 ml     Unmeasured Output  Urine Occurrence: 2  Stool Occurrence: 2  Emesis Occurrence: 0  Pad Count: 4    Labs/Accuchecks:  Recent Labs   Lab 02/01/24  0020   WBC 8.44   RBC 3.80*   HGB 11.3*   HCT 34.6*         Recent Labs   Lab 02/01/24  0020      K 3.8   CO2 24      BUN 28*   CREATININE 1.4   ALKPHOS 85   ALT 33   AST 34   BILITOT 0.7      Recent Labs   Lab 01/29/24  0014   INR 1.0   APTT 24.8    No results for input(s): "CPK", "CPKMB", "TROPONINI", "MB" in the last 168 hours.    Electrolytes: N/A - electrolytes WDL  Accuchecks: Q6H    Gtts:      LDA/Wounds:    Nurses Note -- 4 Eyes      2/1/2024   4:25 PM      Skin assessed during: Q Shift Change    Is there altered skin present? no   [x] No Altered " Skin Integrity Present    [x]Prevention Measures Documented    Attending Nurse:      Second RN/Staff Member:  FABRIZIO Chisholm

## 2024-02-02 NOTE — PLAN OF CARE
[facility] Neuro Critical Care  Initial Discharge Assessment       Primary Care Provider: Estrada Ham MD    Admission Diagnosis: Palliative care encounter [Z51.5]  Embolic stroke involving left middle cerebral artery [I63.412]    Admission Date: 2/2/2024  Expected Discharge Date:          Payor: [employer] / Plan: [employer] / Product Type: Guarantor 3rd Party /     Extended Emergency Contact Information  Primary Emergency Contact: Anitra Jama  Mobile Phone: [phone]  Relation: Spouse  Preferred language: English   needed? No    Discharge Plan A: Inpatient Hospice  Discharge Plan B: Inpatient Hospice      [facility] - [place], [place] - [address]  [address]  [address]  [facility] [address]  Phone: [phone] Fax: [phone]      Initial Assessment (most recent)       Adult Discharge Assessment - 02/02/24 1209          Discharge Assessment    Assessment Type Discharge Planning Assessment     Confirmed/corrected address, phone number and insurance Yes     Confirmed Demographics Correct on Facesheet     Source of Information health record     Reason For Admission Comfort measures only status     People in Home spouse     Do you expect to return to your current living situation? No     Prior to hospitilization cognitive status: Unable to Assess     Current cognitive status: Unable to Assess     Equipment Currently Used at Home none     Readmission within 30 days? Yes     Do you have prescription coverage? Yes     Do you have any problems affording any of your prescribed medications? TBD     Are you on dialysis? No     Do you take coumadin? No     Discharge Plan A Inpatient Hospice     Discharge Plan B Inpatient Hospice                     Readmission Assessment (most recent)       Readmission Assessment - 02/02/24 1211          Readmission    Why were you hospitalized in the last 30 days? Cerebrovascular accident (CVA), unspecified mechanism  (P)      Why were you readmitted? Planned readmission (P)      Was this a planned readmission? Yes (P)                           SW completed Discharge Planning Assessment via chart review. Discharge planning booklet given to patient/family and whiteboard updated with CRESCENCIO and phone #. All questions answered.    Patient is expected to discharge to inpatient hospice with [facility].     Discharge Plan A and Plan B have been determined by review of patient's clinical status, future medical and therapeutic needs, and coverage/benefits for post-acute care in coordination with multidisciplinary team members.      Christiane Denise LMSW  [facility]  Ext. [phone]

## 2024-02-02 NOTE — ASSESSMENT & PLAN NOTE
-2/1/24 patient transitioned to COMFORT MEASURES ONLY.   -[facility]     Antithrombotics for secondary stroke prevention: Antiplatelets: None: Comfort measures only     Statins for secondary stroke prevention and hyperlipidemia, if present:   Statins: None: Reason: Comfort measures only      Rehab efforts: The patient has been evaluated by a stroke team provider and the therapy needs have been fully considered based off the presenting complaints and exam findings. The following therapy evaluations are needed: None    Diagnostics ordered/pending: None     VTE prophylaxis: None: Reason for No Pharmacological VTE Prophylaxis: comfort Measures only

## 2024-02-02 NOTE — H&P
[facility]  Neurocritical Care  History & Physical    Admit Date: 2/2/2024  Service Date: 02/02/2024  Length of Stay: 0    Subjective:     Chief Complaint: Comfort measures only status    History of Present Illness: Patient is an 89-year-old male with history of atrial fibrillation on anticoagulation, hypertension who presents a transfer from an outside hospital for an acute stroke needing thrombectomy.  Last known normal at 1159 pm.  He initially presented with right-sided weakness, right-sided facial droop and aphasia.  Imaging showed a large vessel occlusion so patient was transferred here for thrombectomy.  No TNK outside of window. Patient s/p thrombectomy TiCi3 with stents in left ICA. Admit to [facility] for neuro monitoring and higher level of care.      Hospital Course by Event: 01/28/2024 Unable to place NG/Keotube, ASA load given NV but unable to administer Plavix. Discussed w/ Vascular Neurology, ok for ASA alone for now. GI consulted for assistance w/ enteral access, plan to see patent tomorrow. Metoprolol x 2 , 500cc NS, and 2g mag given for tachycardia. Discussion w/ wife, aravind @ bedside and daughter on phone, code status changed to DNR.  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, pending speech eval  01/30/2024: new temp, ronchi evident, lactate not elevated but devloping SIRS alteration in VS and encephalopathy, pan cultured including NT specimen and started on  vanc/zosyn, begin trickle feeds, presently not hypotensive  01/31/2024: no change in neuro status, from my discussion with wife yesterday, it appears that end of life discussions along with determining goals of care in settings of significant disability were not performed, consult palliative, RVR more of a pronlem but not occurring with significant hypotension, dilt iv bolus appears to have worked-will need to be repeated when dilt gtt arrives  02/01: hypoxia and pulmonary secretions have worsened, HR controlled on combo of dilt and metoprolol, due to continued advancement of his pulmonary condition, it is reasonable to not delay initiation of comfort care and begin immediately with [facility], transfer canceled to outside facility  -Discharge re-admit 2/1/24 to [facility] with inpatient hospice       Past Medical History:   Diagnosis Date    A-fib 01/27/2024    Diabetes mellitus     GERD (gastroesophageal reflux disease)     HLD (hyperlipidemia) 01/27/2024    HTN (hypertension) 01/27/2024     Past Surgical History:   Procedure Laterality Date    ESOPHAGOGASTRODUODENOSCOPY N/A 1/29/2024    Procedure: EGD (ESOPHAGOGASTRODUODENOSCOPY);  Surgeon: Kurt Munoz MD;  Location: [address]);  Service: Endoscopy;  Laterality: N/A;    REPLACEMENT OF DUAL CHAMBER PACEMAKER GENERATOR  02/25/2020      Current Facility-Administered Medications on File Prior to Encounter   Medication Dose Route Frequency Provider Last Rate Last Admin    [COMPLETED] fentaNYL 50 mcg/mL injection 12.5 mcg  12.5 mcg Intravenous Once Teri Chang PA-C   12.5 mcg at 02/01/24 1054    [COMPLETED] furosemide injection 20 mg  20 mg Intravenous Once Teri Chang PA-C   20 mg at 02/01/24 1054    [COMPLETED] tuberculin injection 5 Units  5 Units Intradermal Once Marco Candelaria MD   5 Units at 02/01/24 1424    [DISCONTINUED] acetaminophen tablet 650 mg  650 mg Oral Q6H PRN Deborah Mclean NP   650 mg at 01/31/24 5749    [DISCONTINUED]  aspirin chewable tablet 81 mg  81 mg Per NG tube Daily Leisa Palencia NP   81 mg at 02/01/24 0927    [DISCONTINUED] atorvastatin tablet 40 mg  40 mg Per NG tube Daily Leisa Palencia NP   40 mg at 02/01/24 0927    [DISCONTINUED] bisacodyL suppository 10 mg  10 mg Rectal Daily PRN Deborah Mclean NP        [DISCONTINUED] clopidogreL tablet 75 mg  75 mg Per NG tube Daily Leisa Palencia NP   75 mg at 02/01/24 0927    [DISCONTINUED] dextrose 10% bolus 125 mL 125 mL  12.5 g Intravenous PRN Leisa Palencia NP        [DISCONTINUED] dextrose 10% bolus 250 mL 250 mL  25 g Intravenous PRN Leisa Palencia NP        [DISCONTINUED] diltiaZEM (CARDIZEM) 125 mg in dextrose 5 % (D5W) 125 mL infusion  0-15 mg/hr Intravenous Continuous Teresa Marmolejo NP   Stopped at 02/01/24 1052    [DISCONTINUED] diltiaZEM tablet 30 mg  30 mg Per NG tube Q8H Teri Chang PA-C   30 mg at 02/01/24 1319    [DISCONTINUED] fentaNYL 50 mcg/mL injection 12.5 mcg  12.5 mcg Intravenous Once Teresa Marmolejo NP        [DISCONTINUED] glucagon (human recombinant) injection 1 mg  1 mg Intramuscular PRN Gómez Bernard MD        [DISCONTINUED] glycopyrrolate injection 0.1 mg  0.1 mg Intravenous TID PRN Teri Chang PA-C        [DISCONTINUED] heparin (porcine) injection 5,000 Units  5,000 Units Subcutaneous Q8H Leisa Palencia NP   5,000 Units at 02/01/24 1319    [DISCONTINUED] hydrALAZINE injection 10 mg  10 mg Intravenous Q4H PRN Michaelle Lilly PA-C   10 mg at 01/31/24 0449    [DISCONTINUED] hydrALAZINE tablet 25 mg  25 mg Per NG tube Q8H Teresa Marmolejo NP   25 mg at 02/01/24 1319    [DISCONTINUED] insulin aspart U-100 pen 0-10 Units  0-10 Units Subcutaneous Q6H PRN Gómez Bernard MD   8 Units at 02/01/24 1207    [DISCONTINUED] insulin aspart U-100 pen 2 Units  2 Units Subcutaneous TIDWM Mickal, Alli, PA-C        [DISCONTINUED] insulin aspart U-100 pen 2 Units  2 Units Subcutaneous Q6H Teri Chang PA-C   2 Units at  02/01/24 1207    [DISCONTINUED] labetalol 20 mg/4 mL (5 mg/mL) IV syring  10 mg Intravenous Q4H PRN Michaelle Lilly PA-C   10 mg at 02/01/24 0309    [DISCONTINUED] LORazepam injection 0.5 mg  0.5 mg Intravenous Q1H PRN Teri Chang PA-C        [DISCONTINUED] magnesium oxide split tablet 800 mg  800 mg Per NG tube PRN Marco Candelaria MD        [DISCONTINUED] magnesium oxide split tablet 800 mg  800 mg Per NG tube PRN Marco Candelaria MD        [DISCONTINUED] metoprolol tartrate (LOPRESSOR) tablet 50 mg  50 mg Per NG tube QID Teresa Marmolejo NP   50 mg at 02/01/24 1319    [DISCONTINUED] morphine 100 mg (1 mg/mL) in NACL 100 mL infusion (NON-TITRATING)  1 mg/hr Intravenous Continuous Teri Chang PA-C 1 mL/hr at 02/01/24 2200 1 mg/hr at 02/01/24 2200    [DISCONTINUED] morphine injection 1 mg  1 mg Intravenous Q4H PRN Teri Chang PA-C   1 mg at 02/01/24 1454    [DISCONTINUED] morphine IV bolus from bag/infusion 1 mg  1 mg Intravenous Q4H PRN Teri Chang PA-C   1 mg at 02/01/24 2002    [DISCONTINUED] mupirocin 2 % ointment   Nasal BID Gómez Bernard MD   Given at 02/01/24 0927    [DISCONTINUED] ondansetron injection 4 mg  4 mg Intravenous Q8H PRN Deborah Mclean NP        [DISCONTINUED] piperacillin-tazobactam (ZOSYN) 4.5 g in dextrose 5 % in water (D5W) 100 mL IVPB (MB+)  4.5 g Intravenous Q8H Teresa Marmolejo NP   Stopped at 02/01/24 1859    [DISCONTINUED] potassium bicarbonate disintegrating tablet 35 mEq  35 mEq Per NG tube PRN Marco Candelaria MD        [DISCONTINUED] potassium bicarbonate disintegrating tablet 50 mEq  50 mEq Per NG tube PRN Marco Candelaria MD   50 mEq at 02/01/24 0233    [DISCONTINUED] potassium bicarbonate disintegrating tablet 60 mEq  60 mEq Per NG tube PRN Marco Candelaria MD        [DISCONTINUED] potassium, sodium phosphates 280-160-250 mg packet 2 packet  2 packet Per NG tube PRN Marco Candelaria MD        [DISCONTINUED] potassium, sodium phosphates  280-160-250 mg packet 2 packet  2 packet Per NG tube PRN Marco Candelaria MD        [DISCONTINUED] potassium, sodium phosphates 280-160-250 mg packet 2 packet  2 packet Per NG tube PRN Marco Candelaria MD        [DISCONTINUED] sodium chloride 3% nebulizer solution 4 mL  4 mL Nebulization Q6H Alli Adams PA-C   4 mL at 02/01/24 1237    [DISCONTINUED] vancomycin (VANCOCIN) 1,000 mg in dextrose 5 % (D5W) 250 mL IVPB (Vial-Mate)  1,000 mg Intravenous Q24H Marco Candelaria MD   Stopped at 02/01/24 1448    [DISCONTINUED] vancomycin - pharmacy to dose   Intravenous pharmacy to manage frequency Teresa Marmolejo NP         Current Outpatient Medications on File Prior to Encounter   Medication Sig Dispense Refill    diltiaZEM (CARDIZEM CD) 240 MG 24 hr capsule Take 240 mg by mouth once daily.      furosemide (LASIX) 20 MG tablet Take 20 mg by mouth daily as needed (shortness of breath or swelling).      omeprazole (PRILOSEC) 20 MG capsule Take 20 mg by mouth once daily.      sotaloL (BETAPACE) 160 MG Tab Take 160 mg by mouth 2 (two) times daily.      XARELTO 15 mg Tab Take 15 mg by mouth once daily.        Allergies: Patient has no allergy information on record.  No family history on file.  Social History     Tobacco Use    Smoking status: Former     Types: Cigarettes   Substance Use Topics    Drug use: Never     Review of Systems   Unable to perform ROS: Patient nonverbal     Objective:     Vitals:         Temp  Min: 97 °F (36.1 °C)  Max: 99.1 °F (37.3 °C)  Pulse  Min: 69  Max: 87  BP  Min: 102/55  Max: 161/70  MAP (mmHg)  Min: 75  Max: 120  Resp  Min: 15  Max: 53  SpO2  Min: 87 %  Max: 100 %  Oxygen Concentration (%)  Min: 50  Max: 50    No intake/output data recorded.            Physical Exam    Constitutional: Well-nourished, well-developed. Appears stated age. No obvious distress.   Patient lying in bed comfortably.     HEENT: Normocephalic, atraumatic.   Nose and external ears atraumatic.   Mucus membranes  are moist.     Cardio: Regular rate and rhythm on telemetry monitor.    Respiratory: Regular effort, no respiratory distress.    Skin: No erythema, rash, or wounds to exposed skin.     Neuro: Exam limited by comfort measure only goals.     Obtunded. L gaze deviation, PERRL.   Dense R hemiplegia.       Gait deferred.       Unable to test orientation, language, memory, judgment, insight, fund of knowledge, hearing, shoulder shrug, tongue protrusion, coordination, gait due to level of consciousness.         Assessment/Plan:     Neuro  Embolic stroke involving left middle cerebral artery  -2/1/24 patient transitioned to COMFORT MEASURES ONLY.   -[facility]     Antithrombotics for secondary stroke prevention: Antiplatelets: None: Comfort measures only     Statins for secondary stroke prevention and hyperlipidemia, if present:   Statins: None: Reason: Comfort measures only      Rehab efforts: The patient has been evaluated by a stroke team provider and the therapy needs have been fully considered based off the presenting complaints and exam findings. The following therapy evaluations are needed: None    Diagnostics ordered/pending: None     VTE prophylaxis: None: Reason for No Pharmacological VTE Prophylaxis: comfort Measures only           Palliative Care  * Comfort measures only status  -Comfort measures only starting 2/1/24   -morphine gtt started, titrate to comfort   -PRN ativan for anxiety   -PRN glycopyrrolate for excessive secretions             Activity Orders            None          DNR    Leisa Parkinson PA-C  Neurocritical Care  [facility]

## 2024-02-02 NOTE — PLAN OF CARE
Patient's chart was reviewed by a stroke team provider and discussed with staff.    Briefly, Pierre Jama is a 89 y.o. male with HTN, HLD, Afib (on xarelto) was transferred [place] [facility] with acute L MCA syndrome for intervention of L M1 occlusion.    There is no new imaging to review. Patient will discharge to [facility].      -Vascular Neurology will sign off at this time, thank you for involving us in the care of this patient  -Please do not hesitate to contact the stroke team for any questions or concerns.             ARTURO Chilel, FNP-C  Department of Vascular Neurology  [facility]  [facility]  [phone]

## 2024-02-02 NOTE — SUBJECTIVE & OBJECTIVE
Past Medical History:   Diagnosis Date    A-fib 01/27/2024    Diabetes mellitus     GERD (gastroesophageal reflux disease)     HLD (hyperlipidemia) 01/27/2024    HTN (hypertension) 01/27/2024     Past Surgical History:   Procedure Laterality Date    ESOPHAGOGASTRODUODENOSCOPY N/A 1/29/2024    Procedure: EGD (ESOPHAGOGASTRODUODENOSCOPY);  Surgeon: Kurt Munoz MD;  Location: [address]);  Service: Endoscopy;  Laterality: N/A;    REPLACEMENT OF DUAL CHAMBER PACEMAKER GENERATOR  02/25/2020      Current Facility-Administered Medications on File Prior to Encounter   Medication Dose Route Frequency Provider Last Rate Last Admin    [COMPLETED] fentaNYL 50 mcg/mL injection 12.5 mcg  12.5 mcg Intravenous Once Teri Chang PA-C   12.5 mcg at 02/01/24 1054    [COMPLETED] furosemide injection 20 mg  20 mg Intravenous Once Teri Chang PA-C   20 mg at 02/01/24 1054    [COMPLETED] tuberculin injection 5 Units  5 Units Intradermal Once Marco Candelaria MD   5 Units at 02/01/24 1424    [DISCONTINUED] acetaminophen tablet 650 mg  650 mg Oral Q6H PRN Deborah Mclean NP   650 mg at 01/31/24 0449    [DISCONTINUED] aspirin chewable tablet 81 mg  81 mg Per NG tube Daily Leisa Palencia NP   81 mg at 02/01/24 0927    [DISCONTINUED] atorvastatin tablet 40 mg  40 mg Per NG tube Daily Leisa Palencia NP   40 mg at 02/01/24 0927    [DISCONTINUED] bisacodyL suppository 10 mg  10 mg Rectal Daily PRN Deborah Mclean NP        [DISCONTINUED] clopidogreL tablet 75 mg  75 mg Per NG tube Daily Leisa Palencia NP   75 mg at 02/01/24 0927    [DISCONTINUED] dextrose 10% bolus 125 mL 125 mL  12.5 g Intravenous PRN Leisa Palencia NP        [DISCONTINUED] dextrose 10% bolus 250 mL 250 mL  25 g Intravenous PRN Leisa Palencia NP        [DISCONTINUED] diltiaZEM (CARDIZEM) 125 mg in dextrose 5 % (D5W) 125 mL infusion  0-15 mg/hr Intravenous Continuous Teresa Marmolejo NP   Stopped at 02/01/24 1052     [DISCONTINUED] diltiaZEM tablet 30 mg  30 mg Per NG tube Q8H Teri Chang PA-C   30 mg at 02/01/24 1319    [DISCONTINUED] fentaNYL 50 mcg/mL injection 12.5 mcg  12.5 mcg Intravenous Once Teresa Marmolejo NP        [DISCONTINUED] glucagon (human recombinant) injection 1 mg  1 mg Intramuscular PRN Gómez Bernard MD        [DISCONTINUED] glycopyrrolate injection 0.1 mg  0.1 mg Intravenous TID PRN Teri Chang PA-C        [DISCONTINUED] heparin (porcine) injection 5,000 Units  5,000 Units Subcutaneous Q8H Leisa Palencia NP   5,000 Units at 02/01/24 1319    [DISCONTINUED] hydrALAZINE injection 10 mg  10 mg Intravenous Q4H PRN Michaelle Lilly PA-C   10 mg at 01/31/24 0449    [DISCONTINUED] hydrALAZINE tablet 25 mg  25 mg Per NG tube Q8H Teresa Marmolejo NP   25 mg at 02/01/24 1319    [DISCONTINUED] insulin aspart U-100 pen 0-10 Units  0-10 Units Subcutaneous Q6H PRN Gómez Bernard MD   8 Units at 02/01/24 1207    [DISCONTINUED] insulin aspart U-100 pen 2 Units  2 Units Subcutaneous TIDWM Alli Adams PA-C        [DISCONTINUED] insulin aspart U-100 pen 2 Units  2 Units Subcutaneous Q6H Teri Chang PA-C   2 Units at 02/01/24 1207    [DISCONTINUED] labetalol 20 mg/4 mL (5 mg/mL) IV syring  10 mg Intravenous Q4H PRN Michaelle Lilly PA-C   10 mg at 02/01/24 0309    [DISCONTINUED] LORazepam injection 0.5 mg  0.5 mg Intravenous Q1H PRN Teri Chang PA-C        [DISCONTINUED] magnesium oxide split tablet 800 mg  800 mg Per NG tube PRN Marco Candelaria MD        [DISCONTINUED] magnesium oxide split tablet 800 mg  800 mg Per NG tube PRN Marco Candelaria MD        [DISCONTINUED] metoprolol tartrate (LOPRESSOR) tablet 50 mg  50 mg Per NG tube QID Teresa Marmolejo NP   50 mg at 02/01/24 1319    [DISCONTINUED] morphine 100 mg (1 mg/mL) in NACL 100 mL infusion (NON-TITRATING)  1 mg/hr Intravenous Continuous Teri Chang PA-C 1 mL/hr at 02/01/24 2200 1 mg/hr at 02/01/24 2200    [DISCONTINUED]  morphine injection 1 mg  1 mg Intravenous Q4H PRN Teri Chang PA-C   1 mg at 02/01/24 1454    [DISCONTINUED] morphine IV bolus from bag/infusion 1 mg  1 mg Intravenous Q4H PRN Teri Chang PA-C   1 mg at 02/01/24 2002    [DISCONTINUED] mupirocin 2 % ointment   Nasal BID Gómez Bernard MD   Given at 02/01/24 0927    [DISCONTINUED] ondansetron injection 4 mg  4 mg Intravenous Q8H PRN Deborah Mclean NP        [DISCONTINUED] piperacillin-tazobactam (ZOSYN) 4.5 g in dextrose 5 % in water (D5W) 100 mL IVPB (MB+)  4.5 g Intravenous Q8H Teresa Marmolejo NP   Stopped at 02/01/24 1859    [DISCONTINUED] potassium bicarbonate disintegrating tablet 35 mEq  35 mEq Per NG tube PRN Marco Candelaria MD        [DISCONTINUED] potassium bicarbonate disintegrating tablet 50 mEq  50 mEq Per NG tube PRN Marco Candelaria MD   50 mEq at 02/01/24 0233    [DISCONTINUED] potassium bicarbonate disintegrating tablet 60 mEq  60 mEq Per NG tube PRN Marco Candelaria MD        [DISCONTINUED] potassium, sodium phosphates 280-160-250 mg packet 2 packet  2 packet Per NG tube PRN Marco Candelaria MD        [DISCONTINUED] potassium, sodium phosphates 280-160-250 mg packet 2 packet  2 packet Per NG tube PRN Marco Candelaria MD        [DISCONTINUED] potassium, sodium phosphates 280-160-250 mg packet 2 packet  2 packet Per NG tube PRN Marco Candelaria MD        [DISCONTINUED] sodium chloride 3% nebulizer solution 4 mL  4 mL Nebulization Q6H Alli Adams PA-C   4 mL at 02/01/24 1237    [DISCONTINUED] vancomycin (VANCOCIN) 1,000 mg in dextrose 5 % (D5W) 250 mL IVPB (Vial-Mate)  1,000 mg Intravenous Q24H Marco Candelaria MD   Stopped at 02/01/24 1448    [DISCONTINUED] vancomycin - pharmacy to dose   Intravenous pharmacy to manage frequency Teresa Marmolejo NP         Current Outpatient Medications on File Prior to Encounter   Medication Sig Dispense Refill    diltiaZEM (CARDIZEM CD) 240 MG 24 hr capsule Take 240 mg by mouth once  daily.      furosemide (LASIX) 20 MG tablet Take 20 mg by mouth daily as needed (shortness of breath or swelling).      omeprazole (PRILOSEC) 20 MG capsule Take 20 mg by mouth once daily.      sotaloL (BETAPACE) 160 MG Tab Take 160 mg by mouth 2 (two) times daily.      XARELTO 15 mg Tab Take 15 mg by mouth once daily.        Allergies: Patient has no allergy information on record.  No family history on file.  Social History     Tobacco Use    Smoking status: Former     Types: Cigarettes   Substance Use Topics    Drug use: Never     Review of Systems   Unable to perform ROS: Patient nonverbal     Objective:     Vitals:         Temp  Min: 97 °F (36.1 °C)  Max: 99.1 °F (37.3 °C)  Pulse  Min: 69  Max: 87  BP  Min: 102/55  Max: 161/70  MAP (mmHg)  Min: 75  Max: 120  Resp  Min: 15  Max: 53  SpO2  Min: 87 %  Max: 100 %  Oxygen Concentration (%)  Min: 50  Max: 50    No intake/output data recorded.            Physical Exam    Constitutional: Well-nourished, well-developed. Appears stated age. No obvious distress.   Patient lying in bed comfortably.     HEENT: Normocephalic, atraumatic.   Nose and external ears atraumatic.   Mucus membranes are moist.     Cardio: Regular rate and rhythm on telemetry monitor.    Respiratory: Regular effort, no respiratory distress.    Skin: No erythema, rash, or wounds to exposed skin.     Neuro: Exam limited by comfort measure only goals.     Obtunded. L gaze deviation, PERRL.   Dense R hemiplegia.       Gait deferred.       Unable to test orientation, language, memory, judgment, insight, fund of knowledge, hearing, shoulder shrug, tongue protrusion, coordination, gait due to level of consciousness.

## 2024-02-02 NOTE — HPI
Patient is an 89-year-old male with history of atrial fibrillation on anticoagulation, hypertension who presents a transfer from an outside hospital for an acute stroke needing thrombectomy.  Last known normal at 1159 pm.  He initially presented with right-sided weakness, right-sided facial droop and aphasia.  Imaging showed a large vessel occlusion so patient was transferred here for thrombectomy.  No TNK outside of window. Patient s/p thrombectomy TiCi3 with stents in left ICA. Admit to [facility] for neuro monitoring and higher level of care.      Hospital Course by Event: 01/28/2024 Unable to place NG/Keotube, ASA load given [place] but unable to administer Plavix. Discussed w/ Vascular Neurology, ok for ASA alone for now. GI consulted for assistance w/ enteral access, plan to see patent tomorrow. Metoprolol x 2 , 500cc NS, and 2g mag given for tachycardia. Discussion w/ wife, aravind @ bedside and daughter on phone, code status changed to DNR.  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, pending speech eval  01/30/2024: new temp, ronchi evident, lactate not elevated but devloping SIRS alteration in VS and encephalopathy, pan cultured including NT specimen and started on vanc/zosyn, begin trickle feeds, presently not hypotensive  01/31/2024: no change in neuro status, from my discussion with wife yesterday, it appears that end of life discussions along with determining goals of care in settings of  significant disability were not performed, consult palliative, RVR more of a pronlem but not occurring with significant hypotension, dilt iv bolus appears to have worked-will need to be repeated when dilt gtt arrives  02/01: hypoxia and pulmonary secretions have worsened, HR controlled on combo of dilt and metoprolol, due to continued advancement of his pulmonary condition, it is reasonable to not delay initiation of comfort care and begin immediately with [facility], transfer canceled to outside facility  -Discharge re-admit 2/1/24 to [facility] with inpatient hospice

## 2024-02-02 NOTE — ASSESSMENT & PLAN NOTE
-Comfort measures only starting 2/1/24   -morphine gtt started, titrate to comfort   -PRN ativan for anxiety   -PRN glycopyrrolate for excessive secretions

## 2024-02-03 NOTE — NURSING
Family at bedside and notified nurse of no respirations.  Paced rhythm on cm.  No respirations, apical or radial pulse noted.  Notified Leisa Parkinson PA-C or change in status.  Called and requested hospice nurse return my call via answering service.

## 2024-02-03 NOTE — ASSESSMENT & PLAN NOTE
-Comfort measures only starting 2/1/24   -morphine gtt started with PRN boluses, titrate to comfort   -PRN ativan for anxiety   -PRN glycopyrrolate for excessive secretions + saliva substitute as needed

## 2024-02-03 NOTE — DISCHARGE SUMMARY
Discharge Summary  Critical Care    Admit Date: 2/2/2024     Discharge Date: 2/2/24    LOS: 0    Principle Diagnosis: Comfort measures only status    Secondary Diagnoses:   Active Hospital Problems    Diagnosis  POA    *Comfort measures only status [Z51.5]  Not Applicable    Embolic stroke involving left middle cerebral artery [I63.412]  Yes      Resolved Hospital Problems   No resolved problems to display.        HPI:  Patient is an 89-year-old male with history of atrial fibrillation on anticoagulation, hypertension who presents a transfer from an outside hospital for an acute stroke needing thrombectomy.  Last known normal at 1159 pm.  He initially presented with right-sided weakness, right-sided facial droop and aphasia.  Imaging showed a large vessel occlusion so patient was transferred here for thrombectomy.  No TNK outside of window. Patient s/p thrombectomy TiCi3 with stents in left ICA. Admit to [facility] for neuro monitoring and higher level of care.      Hospital Course by Event: 01/28/2024 Unable to place NG/Keotube, ASA load given VT but unable to administer Plavix. Discussed w/ Vascular Neurology, ok for ASA alone for now. GI consulted for assistance w/ enteral access, plan to see patent tomorrow. Metoprolol x 2 , 500cc NS, and 2g mag given for tachycardia. Discussion w/ wife, aravind @ bedside and daughter on phone, code status changed to DNR.  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,  pending speech eval  01/30/2024: new temp, ronchi evident, lactate not elevated but devloping SIRS alteration in VS and encephalopathy, pan cultured including NT specimen and started on vanc/zosyn, begin trickle feeds, presently not hypotensive  01/31/2024: no change in neuro status, from my discussion with wife yesterday, it appears that end of life discussions along with determining goals of care in settings of significant disability were not performed, consult palliative, RVR more of a pronlem but not occurring with significant hypotension, dilt iv bolus appears to have worked-will need to be repeated when dilt gtt arrives  02/01: hypoxia and pulmonary secretions have worsened, HR controlled on combo of dilt and metoprolol, due to continued advancement of his pulmonary condition, it is reasonable to not delay initiation of comfort care and begin immediately with [facility], transfer canceled to outside facility  -Discharge re-admit 2/1/24 to [facility] with inpatient hospice     Hospital/ICU Course:  02/02/2024: Patient on comfort measures only in inpatient hospice care     Significant Imaging:  CTH 1/29/24 IMPRESSION: Evolving L MCA distribution infarct. No evidence of hemorrhagic transformation. Additional multifocal ischemic changes elsewhere in the brain, likely chronic or subacute.     Significant Laboratory Data:  Recent Results (from the past 336 hour(s))   Basic Metabolic Panel    Collection Time: 01/27/24  8:11 PM   Result Value Ref Range    Sodium 136 136 - 145 mmol/L    Potassium 4.4 3.5 - 5.1 mmol/L    Chloride 99 95 - 110 mmol/L    CO2 20 (L) 23 - 29 mmol/L    BUN 25 (H) 8 - 23 mg/dL    Creatinine 1.3 0.5 - 1.4 mg/dL    Calcium 9.4 8.7 - 10.5 mg/dL    Anion Gap 17 (H) 8 - 16 mmol/L     Recent Results (from the past 336 hour(s))   CBC auto differential    Collection Time: 02/01/24 12:20 AM   Result Value Ref Range    WBC 8.44 3.90 - 12.70 K/uL    Hemoglobin 11.3 (L) 14.0 - 18.0 g/dL    Hematocrit 34.6  (L) 40.0 - 54.0 %    Platelets 198 150 - 450 K/uL   CBC auto differential    Collection Time: 24  1:15 AM   Result Value Ref Range    WBC 8.36 3.90 - 12.70 K/uL    Hemoglobin 11.2 (L) 14.0 - 18.0 g/dL    Hematocrit 34.7 (L) 40.0 - 54.0 %    Platelets 180 150 - 450 K/uL   CBC auto differential    Collection Time: 24  1:12 AM   Result Value Ref Range    WBC 8.01 3.90 - 12.70 K/uL    Hemoglobin 11.5 (L) 14.0 - 18.0 g/dL    Hematocrit 35.1 (L) 40.0 - 54.0 %    Platelets 194 150 - 450 K/uL     Lab Results   Component Value Date    HGBA1C 8.2 (H) 2024     Microbiology Results (last 7 days)       ** No results found for the last 168 hours. **              Consultations:  Gastroenterology  Internal Medicine  Neuro Interventional Radiology  Nutrition  Palliative Medicine  Speech therapy  Vascular Neurology     Procedures:  Cerebral angiogram, carotid angioplasty and stenting, aspiration thrombectomy L MCA 24     Discharge Disposition:Discharged as  . See death note.    Topical Sulfur Applications Counseling: Topical Sulfur Counseling: Patient counseled that this medication may cause skin irritation or allergic reactions.  In the event of skin irritation, the patient was advised to reduce the amount of the drug applied or use it less frequently.   The patient verbalized understanding of the proper use and possible adverse effects of topical sulfur application.  All of the patient's questions and concerns were addressed.

## 2024-02-03 NOTE — PROGRESS NOTES
[facility] Neuro [facility]  Neurocritical Care  Progress Note    Admit Date: 2/2/2024  Service Date: 02/02/2024  Length of Stay: 0    Subjective:     Chief Complaint: Comfort measures only status    History of Present Illness: Patient is an 89-year-old male with history of atrial fibrillation on anticoagulation, hypertension who presents a transfer from an outside hospital for an acute stroke needing thrombectomy.  Last known normal at 1159 pm.  He initially presented with right-sided weakness, right-sided facial droop and aphasia.  Imaging showed a large vessel occlusion so patient was transferred here for thrombectomy.  No TNK outside of window. Patient s/p thrombectomy TiCi3 with stents in left ICA. Admit to [facility] for neuro monitoring and higher level of care.      Hospital Course by Event: 01/28/2024 Unable to place NG/Keotube, ASA load given RI but unable to administer Plavix. Discussed w/ Vascular Neurology, ok for ASA alone for now. GI consulted for assistance w/ enteral access, plan to see patent tomorrow. Metoprolol x 2 , 500cc NS, and 2g mag given for tachycardia. Discussion w/ wife, aravind @ bedside and daughter on phone, code status changed to DNR.  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, pending speech eval  01/30/2024: new temp, ronchi evident, lactate not elevated but devloping SIRS alteration in VS and encephalopathy, pan cultured including NT specimen and started on  vanc/zosyn, begin trickle feeds, presently not hypotensive  01/31/2024: no change in neuro status, from my discussion with wife yesterday, it appears that end of life discussions along with determining goals of care in settings of significant disability were not performed, consult palliative, RVR more of a pronlem but not occurring with significant hypotension, dilt iv bolus appears to have worked-will need to be repeated when dilt gtt arrives  02/01: hypoxia and pulmonary secretions have worsened, HR controlled on combo of dilt and metoprolol, due to continued advancement of his pulmonary condition, it is reasonable to not delay initiation of comfort care and begin immediately with [facility], transfer canceled to outside facility  -Discharge re-admit 2/1/24 to [facility] with inpatient hospice     Hospital Course: 02/02/2024: Patient on comfort measures only in inpatient hospice care     Interval History: Patient transitioned to inpatient hospice care overnight. No events. Comfortable on CMO with wife at bedside. Continue current regimen.     Review of Systems: Unable to obtain a complete ROS due to level of consciousness.     Vitals:   Temp: 99.1 °F (37.3 °C)  Pulse: (!) 168  Rhythm: normal sinus rhythm  BP: (!) 75/58  MAP (mmHg): 63  Resp: 13  SpO2: (!) 86 %    Temp  Min: 99.1 °F (37.3 °C)  Max: 99.1 °F (37.3 °C)  Pulse  Min: 69  Max: 168  BP  Min: 75/58  Max: 135/64  MAP (mmHg)  Min: 63  Max: 91  Resp  Min: 13  Max: 30  SpO2  Min: 86 %  Max: 100 %    02/01 0701 - 02/02 0700  In: 4.5 [I.V.:4.5]  Out: 300 [Urine:300]   Unmeasured Output  Urine Occurrence: 1  Stool Occurrence: 0  Emesis Occurrence: 0  Pad Count: 0     Examination:   Full exam deferred in setting of comfort measures only status  Tachycardic in VT/junctional rhythm   Comfortable, shallow respirations, no signs of air hunger   No excessive secretions     Medications:   Continuousmorphine, Last Rate: 7 mg/hr (02/02/24 1803)    Scheduledsodium  bicarb-sodium chloride, 1 Dose, TID    PRNglycopyrrolate, 0.1 mg, TID PRN  lorazepam, 0.5 mg, Q30 Min PRN  lorazepam, 1 mg, Q4H PRN  ondansetron, 8 mg, Q8H PRN      Assessment/Plan:     Neuro  Embolic stroke involving left middle cerebral artery  -2/1/24 patient transitioned to COMFORT MEASURES ONLY.   -[facility]     Antithrombotics for secondary stroke prevention: Antiplatelets: None: Comfort measures only     Statins for secondary stroke prevention and hyperlipidemia, if present:   Statins: None: Reason: Comfort measures only      Rehab efforts: The patient has been evaluated by a stroke team provider and the therapy needs have been fully considered based off the presenting complaints and exam findings. The following therapy evaluations are needed: None    Diagnostics ordered/pending: None     VTE prophylaxis: None: Reason for No Pharmacological VTE Prophylaxis: comfort Measures only           Palliative Care  * Comfort measures only status  -Comfort measures only starting 2/1/24   -morphine gtt started with PRN boluses, titrate to comfort   -PRN ativan for anxiety   -PRN glycopyrrolate for excessive secretions + saliva substitute as needed             DNR    Teri Chang PATheresaC  Neurocritical Care  [facility] - Neuro Critical Care

## 2024-02-04 LAB
BACTERIA BLD CULT: NORMAL
BACTERIA BLD CULT: NORMAL

## 2024-02-06 NOTE — PHYSICIAN QUERY
PT Name: Pierre Jama  MR #: [id number]     DOCUMENTATION CLARIFICATION     CDS/: Marcelino Coto               Contact information:  This form is a permanent document in the medical record.     Query Date: February 6, 2024    By submitting this query, we are merely seeking further clarification of documentation.  Please utilize your independent clinical judgment when addressing the question(s) below.  The Medical Record contains the following:  Indicators Supporting Clinical Findings Location in Medical Record   x HR         RR          BP        Temp Temp  Min: 97.4 °F (36.3 °C)  Max: 97.4 °F (36.3 °C)  Pulse  Min: 81  Max: 87  BP  Min: 114/66  Max: 176/98  MAP (mmHg)  Min: 82  Max: 82  Resp  Min: 16  Max: 21  SpO2  Min: 95 %  Max: 98 %    Vital Signs Range (Last 24H):  Temp:  [98.7 °F (37.1 °C)-100.8 °F (38.2 °C)]   Pulse:  []   Resp:  [10-33]   BP: (110-158)/(53-84)   SpO2:  [91 %-99 %]   BP Location: Left arm   H/P ([facility]) 1/27 NCC                Progress Note NCC 1/30 ([facility])   x Lactic Acid          Procalcitonin Lactic Acid 1.3    Procalcitonin 0.06    Labs 1/30     x WBC           Bands          CRP  Lab 01/30/24  0112  WBC 8.01  RBC 3.85*  HGB 11.5*  HCT 35.1*     Lab 01/29/24  0014   WBC 8.32    Labs                Progress Note NCC ([facility]) 1/29    Culture(s)     x AMS, Confusion, LOC, etc. Neurologic deficit due to acute ischemic stroke  Aphasia   Right hemiplegia   -Due to stroke   Progress Note NCC ([facility]) 1/29    Organ Dysfunction/Failure     x Bacteremia or Sepsis / Septic Patient admitted to OSH 1/25 for PNA, hypoxic respiratory failure, and sepsis. On 1/27 found to have new RSW, aphasia, L gaze. LKW midnight 1/27, OOW for TNK. Telestroke NIHSS 26. CTA with L M1 occlusion, CTH with good ASPECTS. On arrival to [facility], neuro exam stable with NIHSS 24. Patient taken immediately to IR, now s/p L M1 thrombectomy with TICI 3 reperfusion as well as L ICA angioplasty and  stenting.      Progress Note NCC ([facility]) 1/29    Known or Suspected Source of Infection documented      (Failed) Outpatient Treatment      Medication      Treatment     x Other  history of atrial fibrillation on anticoagulation, hypertension who presents a transfer from an outside hospital for an acute stroke needing thrombectomy.  Last known normal at 1159 pm.  He initially presented with right-sided weakness, right-sided facial droop and aphasia.     01/30/2024: new temp, ronchi evident, lactate not elevated but devloping SIRS alteration in VS and encephalopathy, pan cultured including NT specimen and started on vanc/zosyn, begin trickle feeds, presently not hypotensive     01/30: hold bp lowering agents while in process of developing SIRS/sepsis   H/P ([facility]) 1/27 NCC      D/C Summary ([facility]) 2/2 nCC        Provider, please specify diagnosis or diagnoses associated with above clinical findings.  [   ] Sepsis due to unknown organism   [   ] Sepsis due to (suspected) organism (please specify): __________   [   ] Severe Sepsis with Acute Organ Dysfunction/Failure (please specify organ dysfunction/failure): _______   [   ] SIRS without infectious etiology   [  x ] SIRS with infection but without Sepsis   [   ] Other Infectious Disease (please specify): __________   [   ] Sepsis Ruled Out   [  ] Clinically Undetermined         Please document in your progress notes daily for the duration of treatment until resolved and include in your discharge summary.

## 2024-05-21 ENCOUNTER — DOCUMENTATION ONLY (OUTPATIENT)
Dept: NEUROLOGY | Facility: HOSPITAL | Age: 89
End: 2024-05-21
Payer: MEDICARE